# Patient Record
Sex: MALE | Race: WHITE | NOT HISPANIC OR LATINO
[De-identification: names, ages, dates, MRNs, and addresses within clinical notes are randomized per-mention and may not be internally consistent; named-entity substitution may affect disease eponyms.]

---

## 2019-02-25 ENCOUNTER — APPOINTMENT (OUTPATIENT)
Dept: ORTHOPEDIC SURGERY | Facility: CLINIC | Age: 59
End: 2019-02-25
Payer: COMMERCIAL

## 2019-02-25 DIAGNOSIS — Z80.9 FAMILY HISTORY OF MALIGNANT NEOPLASM, UNSPECIFIED: ICD-10-CM

## 2019-02-25 DIAGNOSIS — F17.200 NICOTINE DEPENDENCE, UNSPECIFIED, UNCOMPLICATED: ICD-10-CM

## 2019-02-25 DIAGNOSIS — Z78.9 OTHER SPECIFIED HEALTH STATUS: ICD-10-CM

## 2019-02-25 PROBLEM — Z00.00 ENCOUNTER FOR PREVENTIVE HEALTH EXAMINATION: Status: ACTIVE | Noted: 2019-02-25

## 2019-02-25 PROCEDURE — 99203 OFFICE O/P NEW LOW 30 MIN: CPT

## 2019-02-25 RX ORDER — CETIRIZINE HYDROCHLORIDE 10 MG/1
10 CAPSULE, LIQUID FILLED ORAL
Refills: 0 | Status: ACTIVE | COMMUNITY
Start: 2019-02-25

## 2019-02-25 NOTE — REVIEW OF SYSTEMS
[Arthralgia] : arthralgia [Joint Pain] : joint pain [Negative] : Heme/Lymph [Joint Swelling] : no joint swelling

## 2019-02-25 NOTE — PHYSICAL EXAM
[2+] : left 2+ [Normal] : Alert and in no acute distress [de-identified] : Decreased ROM with flexion, internal rotation, and external rotation. Pain and restriction with ROM about the left hip.  [de-identified] : Sensation grossly intact about bilateral lower extremities.  [de-identified] : Non tender to palpation about the left hip, no swelling noted. [de-identified] : Radiographs on 2 2519 AP pelvis and lateral left hip shows a very structures intact no fractures or dislocations he has maintained joint space of the right hip superior lateral joint space narrowing of the left hip consistent with advanced osteoarthritis

## 2019-02-25 NOTE — ASSESSMENT
[FreeTextEntry1] : This a 58-year-old male who presents with a one-month history of severe left groin pain. It occurs with walking sitting twisting turning. He is pain with talar shoes and socks getting in and out of chairs and doing most activities of daily living. He's been on diclofenac 50 mg t.i.d. with no relief his physical exam reveals a very restricted painful range of motion of the left hip which elicits severe groin pain the contralateral hip exam is normal the skin and neurovascular status left lower extremity is intact radiographs present for imaging impression severe osteoarthritis of left hip with incapacitating pain not responsive to medical management the plan is to switch him from diclofenac to meloxicam. The patient states that if in a month's time he has not had a favorable response to the medication he would like to proceed with elective left total hip replacement surgery the surgery was discussed as well as potential risks and complications\par The risks, benefits, alternatives of treatment, and aftercare precautions following joint replacement surgery were reviewed with the patient and all questions were answered. Models were used, radiographs reviewed and a brochure was given to the patient. The implant type utilized, including bearing surfaces fixation techniques were reviewed in detail, and the patient chose to proceed with elective joint replacement surgery. The patient's body mass index was recorded in my office notes, and for those patients whose  body mass index of greater than 30, I recommended that they review a weight reduction program with their internist. The importance of smoking cessation was reviewed with all patient's, and for those patients who still smoke, I recommended that they review a smoking cessation program with their internist.\par

## 2019-02-25 NOTE — HISTORY OF PRESENT ILLNESS
[Pain Location] : pain [] : left hip [___ mths] : [unfilled] month(s) ago [10] : a maximum pain level of 10/10 [Standing] : standing [Constant] : ~He/She~ states the symptoms seem to be constant [Sitting] : worsened by sitting [Walking] : worsened by walking [Heat] : relieved by heat [de-identified] : 58 year old male presents to the office today complaining of pain in the left hip x1 month. Pt reports the pain is stabbing in nature. Pt reports the pain can be illicited when moving the hip. Pt was seen by another Orthopedist who recommended he see a joint specialist.

## 2019-03-25 ENCOUNTER — APPOINTMENT (OUTPATIENT)
Dept: ORTHOPEDIC SURGERY | Facility: CLINIC | Age: 59
End: 2019-03-25
Payer: COMMERCIAL

## 2019-03-25 DIAGNOSIS — M16.12 UNILATERAL PRIMARY OSTEOARTHRITIS, LEFT HIP: ICD-10-CM

## 2019-03-25 DIAGNOSIS — M25.552 PAIN IN LEFT HIP: ICD-10-CM

## 2019-03-25 PROCEDURE — 99211 OFF/OP EST MAY X REQ PHY/QHP: CPT

## 2019-03-25 NOTE — HISTORY OF PRESENT ILLNESS
[Pain Location] : pain [] : left hip [___ mths] : [unfilled] month(s) ago [10] : a maximum pain level of 10/10 [Standing] : standing [Constant] : ~He/She~ states the symptoms seem to be constant [Sitting] : worsened by sitting [Walking] : worsened by walking [Heat] : relieved by heat [de-identified] : 58 year old male presents to the office today complaining of pain in the left hip x2 months. Pt returns back to our office today for follow up after we started him on Meloxicam. Pt reports minor relief with Meloxicam. He reports now taking Tramadol which his PMD prescribed for night time use with no relief. Pt reports increased pain with long periods of sitting and walking. Pt has a left total hip arthroplasty scheduled for April 25, 2019. Pt denies any injury or fall.  [NSAIDs] : not relieved by nonsteroidal anti-inflammatory drugs

## 2019-03-25 NOTE — REASON FOR VISIT
[Follow-Up Visit] : a follow-up visit for [Hip Pain] : hip pain [Osteoarthritis, Hip] : osteoarthritis, hip

## 2019-03-25 NOTE — PHYSICAL EXAM
[2+] : left 2+ [Normal] : Alert and in no acute distress [de-identified] : Decreased ROM with flexion, internal rotation, and external rotation. Pain and restriction with ROM about the left hip.  [de-identified] : Sensation grossly intact about bilateral lower extremities.  [de-identified] : Non tender to palpation about the left hip, no swelling noted.

## 2019-03-25 NOTE — ASSESSMENT
[FreeTextEntry1] : Patient comes in today for followup let us know if the meloxicam that we started him on was helpful. He says it was not and he would like to proceed with surgical intervention. He scheduled in April for hip replacement surgery. His primary care doctor recently given tramadol for pain to take at night to help sleep. We'll proceed with surgery as planned

## 2019-04-11 ENCOUNTER — OUTPATIENT (OUTPATIENT)
Dept: OUTPATIENT SERVICES | Facility: HOSPITAL | Age: 59
LOS: 1 days | Discharge: HOME | End: 2019-04-11
Payer: COMMERCIAL

## 2019-04-11 VITALS
SYSTOLIC BLOOD PRESSURE: 120 MMHG | HEIGHT: 72 IN | RESPIRATION RATE: 16 BRPM | WEIGHT: 233.69 LBS | HEART RATE: 72 BPM | OXYGEN SATURATION: 96 % | DIASTOLIC BLOOD PRESSURE: 81 MMHG | TEMPERATURE: 97 F

## 2019-04-11 DIAGNOSIS — Z98.890 OTHER SPECIFIED POSTPROCEDURAL STATES: Chronic | ICD-10-CM

## 2019-04-11 DIAGNOSIS — Z96.642 PRESENCE OF LEFT ARTIFICIAL HIP JOINT: Chronic | ICD-10-CM

## 2019-04-11 DIAGNOSIS — Z98.84 BARIATRIC SURGERY STATUS: Chronic | ICD-10-CM

## 2019-04-11 LAB
ALBUMIN SERPL ELPH-MCNC: 4.5 G/DL — SIGNIFICANT CHANGE UP (ref 3.5–5.2)
ALP SERPL-CCNC: 79 U/L — SIGNIFICANT CHANGE UP (ref 30–115)
ALT FLD-CCNC: 26 U/L — SIGNIFICANT CHANGE UP (ref 0–41)
ANION GAP SERPL CALC-SCNC: 12 MMOL/L — SIGNIFICANT CHANGE UP (ref 7–14)
APPEARANCE UR: CLEAR — SIGNIFICANT CHANGE UP
APTT BLD: 34.7 SEC — SIGNIFICANT CHANGE UP (ref 27–39.2)
AST SERPL-CCNC: 21 U/L — SIGNIFICANT CHANGE UP (ref 0–41)
BASOPHILS # BLD AUTO: 0.05 K/UL — SIGNIFICANT CHANGE UP (ref 0–0.2)
BASOPHILS NFR BLD AUTO: 0.4 % — SIGNIFICANT CHANGE UP (ref 0–1)
BILIRUB SERPL-MCNC: 0.3 MG/DL — SIGNIFICANT CHANGE UP (ref 0.2–1.2)
BILIRUB UR-MCNC: NEGATIVE — SIGNIFICANT CHANGE UP
BLD GP AB SCN SERPL QL: SIGNIFICANT CHANGE UP
BUN SERPL-MCNC: 17 MG/DL — SIGNIFICANT CHANGE UP (ref 10–20)
CALCIUM SERPL-MCNC: 9.5 MG/DL — SIGNIFICANT CHANGE UP (ref 8.5–10.1)
CHLORIDE SERPL-SCNC: 104 MMOL/L — SIGNIFICANT CHANGE UP (ref 98–110)
CO2 SERPL-SCNC: 23 MMOL/L — SIGNIFICANT CHANGE UP (ref 17–32)
COLOR SPEC: YELLOW — SIGNIFICANT CHANGE UP
CREAT SERPL-MCNC: 0.9 MG/DL — SIGNIFICANT CHANGE UP (ref 0.7–1.5)
DIFF PNL FLD: ABNORMAL
EOSINOPHIL # BLD AUTO: 0.34 K/UL — SIGNIFICANT CHANGE UP (ref 0–0.7)
EOSINOPHIL NFR BLD AUTO: 2.8 % — SIGNIFICANT CHANGE UP (ref 0–8)
ESTIMATED AVERAGE GLUCOSE: 120 MG/DL — HIGH (ref 68–114)
GLUCOSE SERPL-MCNC: 101 MG/DL — HIGH (ref 70–99)
GLUCOSE UR QL: NEGATIVE MG/DL — SIGNIFICANT CHANGE UP
HBA1C BLD-MCNC: 5.8 % — HIGH (ref 4–5.6)
HCT VFR BLD CALC: 45.9 % — SIGNIFICANT CHANGE UP (ref 42–52)
HGB BLD-MCNC: 15.7 G/DL — SIGNIFICANT CHANGE UP (ref 14–18)
IMM GRANULOCYTES NFR BLD AUTO: 0.6 % — HIGH (ref 0.1–0.3)
INR BLD: 0.96 RATIO — SIGNIFICANT CHANGE UP (ref 0.65–1.3)
KETONES UR-MCNC: NEGATIVE — SIGNIFICANT CHANGE UP
LEUKOCYTE ESTERASE UR-ACNC: NEGATIVE — SIGNIFICANT CHANGE UP
LYMPHOCYTES # BLD AUTO: 2.81 K/UL — SIGNIFICANT CHANGE UP (ref 1.2–3.4)
LYMPHOCYTES # BLD AUTO: 23.4 % — SIGNIFICANT CHANGE UP (ref 20.5–51.1)
MCHC RBC-ENTMCNC: 30.6 PG — SIGNIFICANT CHANGE UP (ref 27–31)
MCHC RBC-ENTMCNC: 34.2 G/DL — SIGNIFICANT CHANGE UP (ref 32–37)
MCV RBC AUTO: 89.5 FL — SIGNIFICANT CHANGE UP (ref 80–94)
MONOCYTES # BLD AUTO: 1.11 K/UL — HIGH (ref 0.1–0.6)
MONOCYTES NFR BLD AUTO: 9.2 % — SIGNIFICANT CHANGE UP (ref 1.7–9.3)
MRSA PCR RESULT.: NEGATIVE — SIGNIFICANT CHANGE UP
NEUTROPHILS # BLD AUTO: 7.63 K/UL — HIGH (ref 1.4–6.5)
NEUTROPHILS NFR BLD AUTO: 63.6 % — SIGNIFICANT CHANGE UP (ref 42.2–75.2)
NITRITE UR-MCNC: NEGATIVE — SIGNIFICANT CHANGE UP
NRBC # BLD: 0 /100 WBCS — SIGNIFICANT CHANGE UP (ref 0–0)
PH UR: 6 — SIGNIFICANT CHANGE UP (ref 5–8)
PLATELET # BLD AUTO: 228 K/UL — SIGNIFICANT CHANGE UP (ref 130–400)
POTASSIUM SERPL-MCNC: 4.6 MMOL/L — SIGNIFICANT CHANGE UP (ref 3.5–5)
POTASSIUM SERPL-SCNC: 4.6 MMOL/L — SIGNIFICANT CHANGE UP (ref 3.5–5)
PROT SERPL-MCNC: 7.2 G/DL — SIGNIFICANT CHANGE UP (ref 6–8)
PROT UR-MCNC: ABNORMAL MG/DL
PROTHROM AB SERPL-ACNC: 11.1 SEC — SIGNIFICANT CHANGE UP (ref 9.95–12.87)
RBC # BLD: 5.13 M/UL — SIGNIFICANT CHANGE UP (ref 4.7–6.1)
RBC # FLD: 13 % — SIGNIFICANT CHANGE UP (ref 11.5–14.5)
RBC CASTS # UR COMP ASSIST: ABNORMAL /HPF
SODIUM SERPL-SCNC: 139 MMOL/L — SIGNIFICANT CHANGE UP (ref 135–146)
SP GR SPEC: 1.02 — SIGNIFICANT CHANGE UP (ref 1.01–1.03)
TYPE + AB SCN PNL BLD: SIGNIFICANT CHANGE UP
UROBILINOGEN FLD QL: 1 MG/DL (ref 0.2–0.2)
WBC # BLD: 12.01 K/UL — HIGH (ref 4.8–10.8)
WBC # FLD AUTO: 12.01 K/UL — HIGH (ref 4.8–10.8)
WBC UR QL: SIGNIFICANT CHANGE UP /HPF

## 2019-04-11 PROCEDURE — 71046 X-RAY EXAM CHEST 2 VIEWS: CPT | Mod: 26

## 2019-04-11 PROCEDURE — 93010 ELECTROCARDIOGRAM REPORT: CPT

## 2019-04-11 PROCEDURE — 73502 X-RAY EXAM HIP UNI 2-3 VIEWS: CPT | Mod: 26,LT

## 2019-04-11 NOTE — H&P PST ADULT - REASON FOR ADMISSION
57 yo male presents for left total hip replacement, per pt has been causing pain since january; pt is scheduled for left total hip replacement  denies chest pain, palpitations, shortness of breath, dyspnea, or dysuria. exercise tolerance: 2 blocks/ flights of stairs w/o sob; limited dt pain

## 2019-04-11 NOTE — H&P PST ADULT - NSICDXPASTSURGICALHX_GEN_ALL_CORE_FT
PAST SURGICAL HISTORY:  H/O bariatric surgery s/p gastric sleeve    H/O repair of right rotator cuff

## 2019-04-11 NOTE — H&P PST ADULT - NSICDXPASTMEDICALHX_GEN_ALL_CORE_FT
PAST MEDICAL HISTORY:  H/O seasonal allergies     HTN (hypertension)     Obesity s/p gastric sleeve ~ 2013    LINDA (obstructive sleep apnea)

## 2019-04-12 DIAGNOSIS — Z01.818 ENCOUNTER FOR OTHER PREPROCEDURAL EXAMINATION: ICD-10-CM

## 2019-04-12 DIAGNOSIS — I10 ESSENTIAL (PRIMARY) HYPERTENSION: ICD-10-CM

## 2019-04-12 DIAGNOSIS — E66.9 OBESITY, UNSPECIFIED: ICD-10-CM

## 2019-04-12 DIAGNOSIS — M16.11 UNILATERAL PRIMARY OSTEOARTHRITIS, RIGHT HIP: ICD-10-CM

## 2019-04-12 DIAGNOSIS — G47.30 SLEEP APNEA, UNSPECIFIED: ICD-10-CM

## 2019-04-12 DIAGNOSIS — Z87.891 PERSONAL HISTORY OF NICOTINE DEPENDENCE: ICD-10-CM

## 2019-04-12 LAB
CULTURE RESULTS: SIGNIFICANT CHANGE UP
SPECIMEN SOURCE: SIGNIFICANT CHANGE UP

## 2019-04-15 NOTE — PROGRESS NOTE ADULT - SUBJECTIVE AND OBJECTIVE BOX
PAST documents reviewed - MED. DIR. PAST - ANESTHESIA - as of this review for patient scheduled for Left THR under Spinal / Regional Anesthesia with  on 04/16/2019 : I called the patient concerning the Anesthesia plan and medications . He understands he is having Spinal / Regional Anesthesia which I explained preliminarily and informed him that the assigned anesthesiologist would explain it in full pre op . He is on Ramipril , which he takes qhs and was told not to take it in the AM . He takes Tramadol and was told that if he takes it the night before surgery , to take it pre MN with a sip of H2O and inform the admitting RN of the time and dosage . He has taken Meloxicam and Ibuprophen in the past , but not for > 7 days . He has attended the Joint Replacement class and is aware of the ERAS / Gatorade protocol ( confirmed with discussion ) . He takes no AC / Antiplatelets / other Rx or OTC NSAID 's ( all explained to him in full and he expressed understanding not to take these drugs pre op ) .

## 2019-04-16 ENCOUNTER — APPOINTMENT (OUTPATIENT)
Dept: ORTHOPEDIC SURGERY | Facility: HOSPITAL | Age: 59
End: 2019-04-16
Payer: COMMERCIAL

## 2019-04-16 ENCOUNTER — INPATIENT (INPATIENT)
Facility: HOSPITAL | Age: 59
LOS: 1 days | Discharge: HOME HEALTH | End: 2019-04-18
Attending: ORTHOPAEDIC SURGERY | Admitting: ORTHOPAEDIC SURGERY
Payer: COMMERCIAL

## 2019-04-16 ENCOUNTER — RESULT REVIEW (OUTPATIENT)
Age: 59
End: 2019-04-16

## 2019-04-16 VITALS
RESPIRATION RATE: 18 BRPM | SYSTOLIC BLOOD PRESSURE: 135 MMHG | TEMPERATURE: 98 F | HEIGHT: 72 IN | HEART RATE: 72 BPM | DIASTOLIC BLOOD PRESSURE: 71 MMHG | WEIGHT: 233.69 LBS

## 2019-04-16 DIAGNOSIS — Z98.84 BARIATRIC SURGERY STATUS: Chronic | ICD-10-CM

## 2019-04-16 DIAGNOSIS — Z96.642 PRESENCE OF LEFT ARTIFICIAL HIP JOINT: Chronic | ICD-10-CM

## 2019-04-16 DIAGNOSIS — Z98.890 OTHER SPECIFIED POSTPROCEDURAL STATES: Chronic | ICD-10-CM

## 2019-04-16 DIAGNOSIS — Z90.49 ACQUIRED ABSENCE OF OTHER SPECIFIED PARTS OF DIGESTIVE TRACT: Chronic | ICD-10-CM

## 2019-04-16 LAB
BLD GP AB SCN SERPL QL: SIGNIFICANT CHANGE UP
GLUCOSE BLDC GLUCOMTR-MCNC: 114 MG/DL — HIGH (ref 70–99)
HCT VFR BLD CALC: 42.9 % — SIGNIFICANT CHANGE UP (ref 42–52)
HGB BLD-MCNC: 14.4 G/DL — SIGNIFICANT CHANGE UP (ref 14–18)
MCHC RBC-ENTMCNC: 30.5 PG — SIGNIFICANT CHANGE UP (ref 27–31)
MCHC RBC-ENTMCNC: 33.6 G/DL — SIGNIFICANT CHANGE UP (ref 32–37)
MCV RBC AUTO: 90.9 FL — SIGNIFICANT CHANGE UP (ref 80–94)
NRBC # BLD: 0 /100 WBCS — SIGNIFICANT CHANGE UP (ref 0–0)
PLATELET # BLD AUTO: 200 K/UL — SIGNIFICANT CHANGE UP (ref 130–400)
RBC # BLD: 4.72 M/UL — SIGNIFICANT CHANGE UP (ref 4.7–6.1)
RBC # FLD: 12.9 % — SIGNIFICANT CHANGE UP (ref 11.5–14.5)
TYPE + AB SCN PNL BLD: SIGNIFICANT CHANGE UP
WBC # BLD: 21.1 K/UL — HIGH (ref 4.8–10.8)
WBC # FLD AUTO: 21.1 K/UL — HIGH (ref 4.8–10.8)

## 2019-04-16 PROCEDURE — 88305 TISSUE EXAM BY PATHOLOGIST: CPT | Mod: 26

## 2019-04-16 PROCEDURE — 88311 DECALCIFY TISSUE: CPT | Mod: 26

## 2019-04-16 PROCEDURE — 27130 TOTAL HIP ARTHROPLASTY: CPT | Mod: LT

## 2019-04-16 RX ORDER — OXYCODONE HYDROCHLORIDE 5 MG/1
5 TABLET ORAL EVERY 4 HOURS
Qty: 0 | Refills: 0 | Status: DISCONTINUED | OUTPATIENT
Start: 2019-04-16 | End: 2019-04-17

## 2019-04-16 RX ORDER — ASPIRIN/CALCIUM CARB/MAGNESIUM 324 MG
325 TABLET ORAL EVERY 12 HOURS
Qty: 0 | Refills: 0 | Status: DISCONTINUED | OUTPATIENT
Start: 2019-04-17 | End: 2019-04-18

## 2019-04-16 RX ORDER — SODIUM CHLORIDE 9 MG/ML
1000 INJECTION INTRAMUSCULAR; INTRAVENOUS; SUBCUTANEOUS
Qty: 0 | Refills: 0 | Status: DISCONTINUED | OUTPATIENT
Start: 2019-04-16 | End: 2019-04-17

## 2019-04-16 RX ORDER — CELECOXIB 200 MG/1
400 CAPSULE ORAL ONCE
Qty: 0 | Refills: 0 | Status: COMPLETED | OUTPATIENT
Start: 2019-04-16 | End: 2019-04-16

## 2019-04-16 RX ORDER — CEFAZOLIN SODIUM 1 G
2000 VIAL (EA) INJECTION EVERY 8 HOURS
Qty: 0 | Refills: 0 | Status: COMPLETED | OUTPATIENT
Start: 2019-04-17 | End: 2019-04-17

## 2019-04-16 RX ORDER — OXYCODONE HYDROCHLORIDE 5 MG/1
10 TABLET ORAL EVERY 4 HOURS
Qty: 0 | Refills: 0 | Status: DISCONTINUED | OUTPATIENT
Start: 2019-04-16 | End: 2019-04-17

## 2019-04-16 RX ORDER — PANTOPRAZOLE SODIUM 20 MG/1
40 TABLET, DELAYED RELEASE ORAL
Qty: 0 | Refills: 0 | Status: DISCONTINUED | OUTPATIENT
Start: 2019-04-16 | End: 2019-04-18

## 2019-04-16 RX ORDER — HYDROMORPHONE HYDROCHLORIDE 2 MG/ML
1 INJECTION INTRAMUSCULAR; INTRAVENOUS; SUBCUTANEOUS
Qty: 0 | Refills: 0 | Status: DISCONTINUED | OUTPATIENT
Start: 2019-04-16 | End: 2019-04-16

## 2019-04-16 RX ORDER — CELECOXIB 200 MG/1
200 CAPSULE ORAL DAILY
Qty: 0 | Refills: 0 | Status: DISCONTINUED | OUTPATIENT
Start: 2019-04-17 | End: 2019-04-18

## 2019-04-16 RX ORDER — HYDROMORPHONE HYDROCHLORIDE 2 MG/ML
0.5 INJECTION INTRAMUSCULAR; INTRAVENOUS; SUBCUTANEOUS
Qty: 0 | Refills: 0 | Status: DISCONTINUED | OUTPATIENT
Start: 2019-04-16 | End: 2019-04-17

## 2019-04-16 RX ORDER — ACETAMINOPHEN 500 MG
1000 TABLET ORAL ONCE
Qty: 0 | Refills: 0 | Status: COMPLETED | OUTPATIENT
Start: 2019-04-16 | End: 2019-04-16

## 2019-04-16 RX ORDER — ACETAMINOPHEN 500 MG
650 TABLET ORAL EVERY 6 HOURS
Qty: 0 | Refills: 0 | Status: DISCONTINUED | OUTPATIENT
Start: 2019-04-16 | End: 2019-04-18

## 2019-04-16 RX ORDER — HYDROMORPHONE HYDROCHLORIDE 2 MG/ML
0.5 INJECTION INTRAMUSCULAR; INTRAVENOUS; SUBCUTANEOUS
Qty: 0 | Refills: 0 | Status: DISCONTINUED | OUTPATIENT
Start: 2019-04-16 | End: 2019-04-16

## 2019-04-16 RX ORDER — GABAPENTIN 400 MG/1
300 CAPSULE ORAL ONCE
Qty: 0 | Refills: 0 | Status: COMPLETED | OUTPATIENT
Start: 2019-04-16 | End: 2019-04-16

## 2019-04-16 RX ORDER — SODIUM CHLORIDE 9 MG/ML
1000 INJECTION, SOLUTION INTRAVENOUS
Qty: 0 | Refills: 0 | Status: DISCONTINUED | OUTPATIENT
Start: 2019-04-16 | End: 2019-04-17

## 2019-04-16 RX ORDER — ONDANSETRON 8 MG/1
4 TABLET, FILM COATED ORAL EVERY 6 HOURS
Qty: 0 | Refills: 0 | Status: DISCONTINUED | OUTPATIENT
Start: 2019-04-16 | End: 2019-04-18

## 2019-04-16 RX ORDER — FERROUS SULFATE 325(65) MG
325 TABLET ORAL
Qty: 0 | Refills: 0 | Status: DISCONTINUED | OUTPATIENT
Start: 2019-04-16 | End: 2019-04-18

## 2019-04-16 RX ORDER — CHLORHEXIDINE GLUCONATE 213 G/1000ML
1 SOLUTION TOPICAL DAILY
Qty: 0 | Refills: 0 | Status: DISCONTINUED | OUTPATIENT
Start: 2019-04-16 | End: 2019-04-18

## 2019-04-16 RX ORDER — MEPERIDINE HYDROCHLORIDE 50 MG/ML
12.5 INJECTION INTRAMUSCULAR; INTRAVENOUS; SUBCUTANEOUS
Qty: 0 | Refills: 0 | Status: DISCONTINUED | OUTPATIENT
Start: 2019-04-16 | End: 2019-04-16

## 2019-04-16 RX ORDER — GABAPENTIN 400 MG/1
100 CAPSULE ORAL EVERY 8 HOURS
Qty: 0 | Refills: 0 | Status: DISCONTINUED | OUTPATIENT
Start: 2019-04-16 | End: 2019-04-16

## 2019-04-16 RX ORDER — SODIUM CHLORIDE 9 MG/ML
1000 INJECTION, SOLUTION INTRAVENOUS
Qty: 0 | Refills: 0 | Status: DISCONTINUED | OUTPATIENT
Start: 2019-04-16 | End: 2019-04-16

## 2019-04-16 RX ORDER — HYDROMORPHONE HYDROCHLORIDE 2 MG/ML
1 INJECTION INTRAMUSCULAR; INTRAVENOUS; SUBCUTANEOUS
Qty: 0 | Refills: 0 | Status: DISCONTINUED | OUTPATIENT
Start: 2019-04-16 | End: 2019-04-17

## 2019-04-16 RX ORDER — SENNA PLUS 8.6 MG/1
2 TABLET ORAL AT BEDTIME
Qty: 0 | Refills: 0 | Status: DISCONTINUED | OUTPATIENT
Start: 2019-04-16 | End: 2019-04-18

## 2019-04-16 RX ORDER — ONDANSETRON 8 MG/1
4 TABLET, FILM COATED ORAL ONCE
Qty: 0 | Refills: 0 | Status: DISCONTINUED | OUTPATIENT
Start: 2019-04-16 | End: 2019-04-16

## 2019-04-16 RX ORDER — DOCUSATE SODIUM 100 MG
100 CAPSULE ORAL THREE TIMES A DAY
Qty: 0 | Refills: 0 | Status: DISCONTINUED | OUTPATIENT
Start: 2019-04-16 | End: 2019-04-18

## 2019-04-16 RX ORDER — LISINOPRIL 2.5 MG/1
20 TABLET ORAL DAILY
Qty: 0 | Refills: 0 | Status: DISCONTINUED | OUTPATIENT
Start: 2019-04-17 | End: 2019-04-18

## 2019-04-16 RX ORDER — GABAPENTIN 400 MG/1
100 CAPSULE ORAL EVERY 8 HOURS
Qty: 0 | Refills: 0 | Status: DISCONTINUED | OUTPATIENT
Start: 2019-04-16 | End: 2019-04-18

## 2019-04-16 RX ADMIN — HYDROMORPHONE HYDROCHLORIDE 0.5 MILLIGRAM(S): 2 INJECTION INTRAMUSCULAR; INTRAVENOUS; SUBCUTANEOUS at 20:30

## 2019-04-16 RX ADMIN — HYDROMORPHONE HYDROCHLORIDE 0.5 MILLIGRAM(S): 2 INJECTION INTRAMUSCULAR; INTRAVENOUS; SUBCUTANEOUS at 20:40

## 2019-04-16 RX ADMIN — GABAPENTIN 300 MILLIGRAM(S): 400 CAPSULE ORAL at 12:40

## 2019-04-16 RX ADMIN — CELECOXIB 400 MILLIGRAM(S): 200 CAPSULE ORAL at 12:40

## 2019-04-16 RX ADMIN — Medication 650 MILLIGRAM(S): at 23:18

## 2019-04-16 RX ADMIN — HYDROMORPHONE HYDROCHLORIDE 0.5 MILLIGRAM(S): 2 INJECTION INTRAMUSCULAR; INTRAVENOUS; SUBCUTANEOUS at 21:10

## 2019-04-16 RX ADMIN — GABAPENTIN 100 MILLIGRAM(S): 400 CAPSULE ORAL at 23:18

## 2019-04-16 RX ADMIN — HYDROMORPHONE HYDROCHLORIDE 0.5 MILLIGRAM(S): 2 INJECTION INTRAMUSCULAR; INTRAVENOUS; SUBCUTANEOUS at 21:55

## 2019-04-16 RX ADMIN — Medication 1000 MILLIGRAM(S): at 12:40

## 2019-04-16 RX ADMIN — HYDROMORPHONE HYDROCHLORIDE 0.5 MILLIGRAM(S): 2 INJECTION INTRAMUSCULAR; INTRAVENOUS; SUBCUTANEOUS at 21:21

## 2019-04-16 RX ADMIN — SODIUM CHLORIDE 100 MILLILITER(S): 9 INJECTION INTRAMUSCULAR; INTRAVENOUS; SUBCUTANEOUS at 21:30

## 2019-04-16 RX ADMIN — HYDROMORPHONE HYDROCHLORIDE 0.5 MILLIGRAM(S): 2 INJECTION INTRAMUSCULAR; INTRAVENOUS; SUBCUTANEOUS at 21:00

## 2019-04-16 RX ADMIN — Medication 100 MILLIGRAM(S): at 23:19

## 2019-04-16 RX ADMIN — SODIUM CHLORIDE 120 MILLILITER(S): 9 INJECTION, SOLUTION INTRAVENOUS at 20:11

## 2019-04-16 RX ADMIN — HYDROMORPHONE HYDROCHLORIDE 0.5 MILLIGRAM(S): 2 INJECTION INTRAMUSCULAR; INTRAVENOUS; SUBCUTANEOUS at 20:55

## 2019-04-16 NOTE — CHART NOTE - NSCHARTNOTEFT_GEN_A_CORE
PACU ANESTHESIA ADMISSION NOTE      Procedure: Arthroplasty of left hip    Post op diagnosis:  Osteoarthritis of left hip      ____  Intubated  TV:______       Rate: ______      FiO2: ______    __x__  Patent Airway    ____  Full return of protective reflexes    ____  Full recovery from anesthesia / back to baseline     Vitals:   T:    98       R:    12              BP:     149/79              Sat:    98%                P:  89      Mental Status:  __x__ Awake   _____ Alert   _____ Drowsy   _____ Sedated    Nausea/Vomiting:  _x___ NO  ______Yes,   See Post - Op Orders          Pain Scale (0-10):  _____    Treatment: ____ None    __x__ See Post - Op/PCA Orders    Post - Operative Fluids:   ____ Oral   _x___ See Post - Op Orders    Plan: Discharge:   ____Home       __x___Floor     _____Critical Care    _____  Other:_________________    Comments: Uneventful intraoperative course. No anesthesia issues or complications noted.  Patient stable upon arrival to PACU. Report given to RN. Discharge when criteria met.

## 2019-04-16 NOTE — ASU PATIENT PROFILE, ADULT - PSH
H/O bariatric surgery  s/p gastric sleeve  H/O repair of right rotator cuff    History of appendectomy

## 2019-04-16 NOTE — PROGRESS NOTE ADULT - SUBJECTIVE AND OBJECTIVE BOX
ORTHO POC    Patient seen and examined after his surgery. Resting comfortably, no active complaints. Denies fevers, chills, SOB.     PE :  LLE :   Dressings c/d/i  Thigh and calf soft and compressible  EHL TA GS motor intact  SILT distally  Foot warm and perfused    A/P  58M s/p L EVELINE:  - Pain control  - DVT PPX: ASA + SCD  - IS  - WBAT LLE  - PT OT OOBTC  - F/U AM Labs  - Dispo Planning: likely home  - Ortho primary management

## 2019-04-16 NOTE — ASU PATIENT PROFILE, ADULT - PMH
H/O seasonal allergies    HTN (hypertension)    Obesity  s/p gastric sleeve ~ 2013  LINDA (obstructive sleep apnea)

## 2019-04-17 DIAGNOSIS — M25.552 PAIN IN LEFT HIP: ICD-10-CM

## 2019-04-17 LAB
ANION GAP SERPL CALC-SCNC: 11 MMOL/L — SIGNIFICANT CHANGE UP (ref 7–14)
BUN SERPL-MCNC: 14 MG/DL — SIGNIFICANT CHANGE UP (ref 10–20)
CALCIUM SERPL-MCNC: 8.4 MG/DL — LOW (ref 8.5–10.1)
CHLORIDE SERPL-SCNC: 102 MMOL/L — SIGNIFICANT CHANGE UP (ref 98–110)
CO2 SERPL-SCNC: 26 MMOL/L — SIGNIFICANT CHANGE UP (ref 17–32)
CREAT SERPL-MCNC: 0.9 MG/DL — SIGNIFICANT CHANGE UP (ref 0.7–1.5)
GLUCOSE BLDC GLUCOMTR-MCNC: 101 MG/DL — HIGH (ref 70–99)
GLUCOSE BLDC GLUCOMTR-MCNC: 96 MG/DL — SIGNIFICANT CHANGE UP (ref 70–99)
GLUCOSE SERPL-MCNC: 109 MG/DL — HIGH (ref 70–99)
HCT VFR BLD CALC: 36 % — LOW (ref 42–52)
HGB BLD-MCNC: 11.9 G/DL — LOW (ref 14–18)
MCHC RBC-ENTMCNC: 30.2 PG — SIGNIFICANT CHANGE UP (ref 27–31)
MCHC RBC-ENTMCNC: 33.1 G/DL — SIGNIFICANT CHANGE UP (ref 32–37)
MCV RBC AUTO: 91.4 FL — SIGNIFICANT CHANGE UP (ref 80–94)
NRBC # BLD: 0 /100 WBCS — SIGNIFICANT CHANGE UP (ref 0–0)
PLATELET # BLD AUTO: 197 K/UL — SIGNIFICANT CHANGE UP (ref 130–400)
POTASSIUM SERPL-MCNC: 4.5 MMOL/L — SIGNIFICANT CHANGE UP (ref 3.5–5)
POTASSIUM SERPL-SCNC: 4.5 MMOL/L — SIGNIFICANT CHANGE UP (ref 3.5–5)
RBC # BLD: 3.94 M/UL — LOW (ref 4.7–6.1)
RBC # FLD: 12.9 % — SIGNIFICANT CHANGE UP (ref 11.5–14.5)
SODIUM SERPL-SCNC: 139 MMOL/L — SIGNIFICANT CHANGE UP (ref 135–146)
WBC # BLD: 11.6 K/UL — HIGH (ref 4.8–10.8)
WBC # FLD AUTO: 11.6 K/UL — HIGH (ref 4.8–10.8)

## 2019-04-17 RX ORDER — KETOROLAC TROMETHAMINE 30 MG/ML
30 SYRINGE (ML) INJECTION EVERY 6 HOURS
Qty: 0 | Refills: 0 | Status: DISCONTINUED | OUTPATIENT
Start: 2019-04-17 | End: 2019-04-17

## 2019-04-17 RX ORDER — OXYCODONE HYDROCHLORIDE 5 MG/1
10 TABLET ORAL EVERY 4 HOURS
Qty: 0 | Refills: 0 | Status: DISCONTINUED | OUTPATIENT
Start: 2019-04-17 | End: 2019-04-18

## 2019-04-17 RX ORDER — KETOROLAC TROMETHAMINE 30 MG/ML
30 SYRINGE (ML) INJECTION EVERY 6 HOURS
Qty: 0 | Refills: 0 | Status: DISCONTINUED | OUTPATIENT
Start: 2019-04-17 | End: 2019-04-18

## 2019-04-17 RX ORDER — MORPHINE SULFATE 50 MG/1
2 CAPSULE, EXTENDED RELEASE ORAL EVERY 4 HOURS
Qty: 0 | Refills: 0 | Status: DISCONTINUED | OUTPATIENT
Start: 2019-04-17 | End: 2019-04-17

## 2019-04-17 RX ADMIN — Medication 325 MILLIGRAM(S): at 17:17

## 2019-04-17 RX ADMIN — Medication 30 MILLIGRAM(S): at 18:05

## 2019-04-17 RX ADMIN — Medication 325 MILLIGRAM(S): at 11:08

## 2019-04-17 RX ADMIN — Medication 650 MILLIGRAM(S): at 17:17

## 2019-04-17 RX ADMIN — Medication 650 MILLIGRAM(S): at 05:20

## 2019-04-17 RX ADMIN — MORPHINE SULFATE 2 MILLIGRAM(S): 50 CAPSULE, EXTENDED RELEASE ORAL at 12:52

## 2019-04-17 RX ADMIN — Medication 100 MILLIGRAM(S): at 05:20

## 2019-04-17 RX ADMIN — LISINOPRIL 20 MILLIGRAM(S): 2.5 TABLET ORAL at 05:20

## 2019-04-17 RX ADMIN — Medication 325 MILLIGRAM(S): at 08:00

## 2019-04-17 RX ADMIN — PANTOPRAZOLE SODIUM 40 MILLIGRAM(S): 20 TABLET, DELAYED RELEASE ORAL at 08:00

## 2019-04-17 RX ADMIN — GABAPENTIN 100 MILLIGRAM(S): 400 CAPSULE ORAL at 21:45

## 2019-04-17 RX ADMIN — MORPHINE SULFATE 2 MILLIGRAM(S): 50 CAPSULE, EXTENDED RELEASE ORAL at 01:00

## 2019-04-17 RX ADMIN — Medication 100 MILLIGRAM(S): at 21:45

## 2019-04-17 RX ADMIN — Medication 325 MILLIGRAM(S): at 05:20

## 2019-04-17 RX ADMIN — CELECOXIB 200 MILLIGRAM(S): 200 CAPSULE ORAL at 11:06

## 2019-04-17 RX ADMIN — Medication 650 MILLIGRAM(S): at 18:05

## 2019-04-17 RX ADMIN — OXYCODONE HYDROCHLORIDE 10 MILLIGRAM(S): 5 TABLET ORAL at 18:44

## 2019-04-17 RX ADMIN — Medication 100 MILLIGRAM(S): at 05:21

## 2019-04-17 RX ADMIN — CHLORHEXIDINE GLUCONATE 1 APPLICATION(S): 213 SOLUTION TOPICAL at 11:06

## 2019-04-17 RX ADMIN — Medication 30 MILLIGRAM(S): at 17:17

## 2019-04-17 RX ADMIN — OXYCODONE HYDROCHLORIDE 10 MILLIGRAM(S): 5 TABLET ORAL at 10:50

## 2019-04-17 RX ADMIN — Medication 1 TABLET(S): at 11:06

## 2019-04-17 RX ADMIN — OXYCODONE HYDROCHLORIDE 10 MILLIGRAM(S): 5 TABLET ORAL at 05:19

## 2019-04-17 RX ADMIN — GABAPENTIN 100 MILLIGRAM(S): 400 CAPSULE ORAL at 14:07

## 2019-04-17 RX ADMIN — Medication 100 MILLIGRAM(S): at 14:07

## 2019-04-17 RX ADMIN — OXYCODONE HYDROCHLORIDE 10 MILLIGRAM(S): 5 TABLET ORAL at 00:53

## 2019-04-17 RX ADMIN — OXYCODONE HYDROCHLORIDE 10 MILLIGRAM(S): 5 TABLET ORAL at 10:20

## 2019-04-17 RX ADMIN — OXYCODONE HYDROCHLORIDE 10 MILLIGRAM(S): 5 TABLET ORAL at 21:50

## 2019-04-17 RX ADMIN — Medication 650 MILLIGRAM(S): at 11:06

## 2019-04-17 RX ADMIN — OXYCODONE HYDROCHLORIDE 10 MILLIGRAM(S): 5 TABLET ORAL at 21:45

## 2019-04-17 RX ADMIN — Medication 100 MILLIGRAM(S): at 12:54

## 2019-04-17 RX ADMIN — GABAPENTIN 100 MILLIGRAM(S): 400 CAPSULE ORAL at 05:21

## 2019-04-17 RX ADMIN — OXYCODONE HYDROCHLORIDE 10 MILLIGRAM(S): 5 TABLET ORAL at 18:06

## 2019-04-17 NOTE — PHYSICAL THERAPY INITIAL EVALUATION ADULT - GENERAL OBSERVATIONS, REHAB EVAL
8:45-8:15. chart reviewed. Pt received sitting at B/S chair, alert, oriented, able to follow multi step instructions and agreeable to PT evaluation. + IV, c/o of L hip pain 6/10 increased with movement 9/10. BP before tx 109/55, Pt is asymptomatic, stated it is his baseline BP. BP post tx 83/45 with no symptoms. nursing made aware. 8:45-9:15. chart reviewed. Pt received sitting at B/S chair, alert, oriented, able to follow multi step instructions and agreeable to PT evaluation. + IV, c/o of L hip pain 6/10 increased with movement 9/10. BP before tx 109/55, Pt is asymptomatic, stated it is his baseline BP. BP post tx 83/45 with no symptoms. nursing made aware.

## 2019-04-17 NOTE — PROVIDER CONTACT NOTE (MEDICATION) - SITUATION
patient is c/o pain with little relief from pain medication. pt c/o pain and 10 mg of oxycodone was given. the patient stated that did not help and RN gave Tylenol and Celebrex. pt then reported to Jh from

## 2019-04-17 NOTE — PHYSICAL THERAPY INITIAL EVALUATION ADULT - PLANNED THERAPY INTERVENTIONS, PT EVAL
neuromuscular re-education/transfer training/bed mobility training/strengthening/gait training/postural re-education/balance training

## 2019-04-17 NOTE — PROGRESS NOTE ADULT - PROBLEM SELECTOR PLAN 1
acetaminophen   Tablet .. 650 milliGRAM(s) Oral every 6 hours  celecoxib 200 milliGRAM(s) Oral daily  gabapentin 100 milliGRAM(s) Oral every 8 hours  DC HYDROmorphone  Injectable 0.5 milliGRAM(s) IV Push every 10 minutes PRN  DC HYDROmorphone  Injectable 1 milliGRAM(s) IV Push every 10 minutes PRN  DC morphine  - Injectable 2 milliGRAM(s) IV Push every 4 hours PRN  ondansetron Injectable 4 milliGRAM(s) IV Push every 6 hours PRN  DC oxyCODONE    IR 5 milliGRAM(s) Oral every 4 hours PRN  DC oxyCODONE    IR 10 milliGRAM(s) Oral every 4 hours PRN  MSIR 15mg PO Q4hrs PRN

## 2019-04-17 NOTE — OCCUPATIONAL THERAPY INITIAL EVALUATION ADULT - PLANNED THERAPY INTERVENTIONS, OT EVAL
ADL retraining/balance training/bed mobility training/strengthening/stretching/ROM/transfer training

## 2019-04-17 NOTE — PROVIDER CONTACT NOTE (MEDICATION) - BACKGROUND
pain management that he is still in pain. the pt does not have additional orders for pain meds for the floor. there is an order for Dilaudid to be given q1o minutes which is a PACU order, can you please D/CC that. Jh suggested MS IR 15 mg PO to be given PRN q4hrs.

## 2019-04-17 NOTE — PROVIDER CONTACT NOTE (OTHER) - SITUATION
pt has not voided since after surgery last night. I bladder scanned patient and there was a residual of 473. can you please place an order to straight cath pt.

## 2019-04-17 NOTE — OCCUPATIONAL THERAPY INITIAL EVALUATION ADULT - GENERAL OBSERVATIONS, REHAB EVAL
pt received seated in b/s chair in NAD agreeable to OT eval left supine in bed at pt's request secondary to pain in L hip RN aware

## 2019-04-17 NOTE — PROGRESS NOTE ADULT - SUBJECTIVE AND OBJECTIVE BOX
58M s/p left eveline pod1    Patient seen and examined, c/o pain, did not urinate since surgery Denies fevers, chills, SOB, CP    NAD    Vital Signs Last 24 Hrs  T(C): 36.7 (17 Apr 2019 03:59), Max: 36.9 (16 Apr 2019 21:10)  T(F): 98 (17 Apr 2019 03:59), Max: 98.5 (16 Apr 2019 22:00)  HR: 76 (17 Apr 2019 03:59) (72 - 92)  BP: 109/59 (17 Apr 2019 03:59) (104/68 - 150/93)  BP(mean): --  RR: 18 (17 Apr 2019 03:59) (12 - 21)  SpO2: 97% (16 Apr 2019 22:30) (88% - 100%)    PE :  LLE :   Dressings c/d/i  Thigh and calf soft and compressible  EHL TA GS motor intact  SILT distally  Foot warm and perfused    Abdomen soft, no SP tenderness                          14.4   21.10 )-----------( 200      ( 16 Apr 2019 20:51 )             42.9         A/P  58M s/p L EVELINE pod1    - bedside UB scan if pt will not be able to urinate when standing, d/w nurse  - Pain control  - DVT PPX: ASA + SCD  - IS  - WBAT LLE  - PT OT OOBTC  - F/U AM Labs  - Dispo Planning: likely home

## 2019-04-17 NOTE — PROGRESS NOTE ADULT - SUBJECTIVE AND OBJECTIVE BOX
Patient with left THR. pain to the left hip is now controlled with current regimen. movement and sensation present to the left leg. Pain is not controlled at this time. pain is 10/10 before and after pain meds.

## 2019-04-17 NOTE — PROVIDER CONTACT NOTE (MEDICATION) - SITUATION
patient is scheduled at this time to receive 650 mg Tylenol, Toradol IV push 30 mg and 10mg Oxycodone IR. Is it safe to give all three medications at the same time. do you want them given all together?

## 2019-04-18 ENCOUNTER — TRANSCRIPTION ENCOUNTER (OUTPATIENT)
Age: 59
End: 2019-04-18

## 2019-04-18 VITALS
HEART RATE: 84 BPM | RESPIRATION RATE: 18 BRPM | DIASTOLIC BLOOD PRESSURE: 57 MMHG | TEMPERATURE: 99 F | SYSTOLIC BLOOD PRESSURE: 101 MMHG

## 2019-04-18 LAB
HCT VFR BLD CALC: 30.7 % — LOW (ref 42–52)
HGB BLD-MCNC: 10.2 G/DL — LOW (ref 14–18)
MCHC RBC-ENTMCNC: 30.1 PG — SIGNIFICANT CHANGE UP (ref 27–31)
MCHC RBC-ENTMCNC: 33.2 G/DL — SIGNIFICANT CHANGE UP (ref 32–37)
MCV RBC AUTO: 90.6 FL — SIGNIFICANT CHANGE UP (ref 80–94)
NRBC # BLD: 0 /100 WBCS — SIGNIFICANT CHANGE UP (ref 0–0)
PLATELET # BLD AUTO: 167 K/UL — SIGNIFICANT CHANGE UP (ref 130–400)
RBC # BLD: 3.39 M/UL — LOW (ref 4.7–6.1)
RBC # FLD: 13 % — SIGNIFICANT CHANGE UP (ref 11.5–14.5)
WBC # BLD: 10.77 K/UL — SIGNIFICANT CHANGE UP (ref 4.8–10.8)
WBC # FLD AUTO: 10.77 K/UL — SIGNIFICANT CHANGE UP (ref 4.8–10.8)

## 2019-04-18 RX ORDER — ASPIRIN/CALCIUM CARB/MAGNESIUM 324 MG
1 TABLET ORAL
Qty: 80 | Refills: 0
Start: 2019-04-18 | End: 2019-05-27

## 2019-04-18 RX ORDER — OXYCODONE HYDROCHLORIDE 5 MG/1
1 TABLET ORAL
Qty: 20 | Refills: 0
Start: 2019-04-18 | End: 2019-04-22

## 2019-04-18 RX ORDER — PANTOPRAZOLE SODIUM 20 MG/1
1 TABLET, DELAYED RELEASE ORAL
Qty: 40 | Refills: 0
Start: 2019-04-18 | End: 2019-05-27

## 2019-04-18 RX ADMIN — GABAPENTIN 100 MILLIGRAM(S): 400 CAPSULE ORAL at 05:43

## 2019-04-18 RX ADMIN — CELECOXIB 200 MILLIGRAM(S): 200 CAPSULE ORAL at 12:50

## 2019-04-18 RX ADMIN — OXYCODONE HYDROCHLORIDE 10 MILLIGRAM(S): 5 TABLET ORAL at 05:44

## 2019-04-18 RX ADMIN — Medication 30 MILLIGRAM(S): at 00:29

## 2019-04-18 RX ADMIN — Medication 650 MILLIGRAM(S): at 00:29

## 2019-04-18 RX ADMIN — Medication 650 MILLIGRAM(S): at 12:51

## 2019-04-18 RX ADMIN — Medication 1 TABLET(S): at 12:51

## 2019-04-18 RX ADMIN — LISINOPRIL 20 MILLIGRAM(S): 2.5 TABLET ORAL at 05:43

## 2019-04-18 RX ADMIN — Medication 650 MILLIGRAM(S): at 05:42

## 2019-04-18 RX ADMIN — Medication 30 MILLIGRAM(S): at 05:43

## 2019-04-18 RX ADMIN — CHLORHEXIDINE GLUCONATE 1 APPLICATION(S): 213 SOLUTION TOPICAL at 14:12

## 2019-04-18 RX ADMIN — Medication 100 MILLIGRAM(S): at 05:42

## 2019-04-18 RX ADMIN — Medication 325 MILLIGRAM(S): at 05:42

## 2019-04-18 RX ADMIN — Medication 100 MILLIGRAM(S): at 14:17

## 2019-04-18 RX ADMIN — Medication 30 MILLIGRAM(S): at 12:51

## 2019-04-18 RX ADMIN — Medication 650 MILLIGRAM(S): at 05:44

## 2019-04-18 RX ADMIN — GABAPENTIN 100 MILLIGRAM(S): 400 CAPSULE ORAL at 14:17

## 2019-04-18 RX ADMIN — Medication 325 MILLIGRAM(S): at 08:41

## 2019-04-18 RX ADMIN — OXYCODONE HYDROCHLORIDE 10 MILLIGRAM(S): 5 TABLET ORAL at 14:17

## 2019-04-18 RX ADMIN — PANTOPRAZOLE SODIUM 40 MILLIGRAM(S): 20 TABLET, DELAYED RELEASE ORAL at 05:42

## 2019-04-18 RX ADMIN — OXYCODONE HYDROCHLORIDE 10 MILLIGRAM(S): 5 TABLET ORAL at 05:43

## 2019-04-18 RX ADMIN — Medication 325 MILLIGRAM(S): at 12:50

## 2019-04-18 NOTE — DISCHARGE NOTE PROVIDER - NSDCCPGOAL_GEN_ALL_CORE_FT
To get better and follow your care plan as instructed, continue asa 325 mg bid 6 weeks postop for dvt ppx, PT WBAT, f/u as OP with dr Galarza in 2 weeks

## 2019-04-18 NOTE — PROVIDER CONTACT NOTE (MEDICATION) - ACTION/TREATMENT ORDERED:
MARIO Spence aware. as per MARIO Spence, no further intervention.
provider stated to give Toradol first then give oxy and Tylenol
providers stated that the will adjust orders accordingly and to please look out for them,

## 2019-04-18 NOTE — PROGRESS NOTE ADULT - SUBJECTIVE AND OBJECTIVE BOX
OPERATIVE PROCEDURE(s):                POD #  2 Left EVELINE                     58yMale  SURGEON(s): JACKLYN Galarza  SUBJECTIVE ASSESSMENT: resting in bed, has utilized walker requesting SNF referral  Vital Signs Last 24 Hrs  T(F): 97.5 (18 Apr 2019 07:56), Max: 98.5 (17 Apr 2019 15:24)  HR: 86 (18 Apr 2019 07:56) (74 - 86)  BP: 81/48 (18 Apr 2019 07:56) (81/48 - 112/60)  RR: 18 (18 Apr 2019 07:56) (16 - 18)    Physical Exam:  General: NAD; A&Ox3  Incision: dressing changed, wound clean and dry on signs of infection  Extremities: No significant edema b/l lower extremities; left calf soft, dorsi and plantar flexion intact, toes warm with sensory intact      LABS:                        10.2<L>  10.77 )-----------( 167      ( 18 Apr 2019 05:44 )             30.7<L>                        11.9<L>  11.60<H> )-----------( 197      ( 17 Apr 2019 06:04 )             36.0<L>    04-17    139  |  102  |  14  ----------------------------<  109<H>  4.5   |  26  |  0.9    Ca    8.4<L>      17 Apr 2019 06:04    MEDICATIONS  (STANDING):  acetaminophen   Tablet .. 650 milliGRAM(s) Oral every 6 hours  aspirin enteric coated 325 milliGRAM(s) Oral every 12 hours  celecoxib 200 milliGRAM(s) Oral daily  chlorhexidine 2% Cloths 1 Application(s) Topical daily  docusate sodium 100 milliGRAM(s) Oral three times a day  ferrous    sulfate 325 milliGRAM(s) Oral three times a day with meals  gabapentin 100 milliGRAM(s) Oral every 8 hours  ketorolac   Injectable 30 milliGRAM(s) IV Push every 6 hours  lisinopril 20 milliGRAM(s) Oral daily  multivitamin 1 Tablet(s) Oral daily  oxyCODONE    IR 10 milliGRAM(s) Oral every 4 hours  pantoprazole    Tablet 40 milliGRAM(s) Oral before breakfast    MEDICATIONS  (PRN):  bisacodyl Suppository 10 milliGRAM(s) Rectal daily PRN If no bowel movement by POD#2  ondansetron Injectable 4 milliGRAM(s) IV Push every 6 hours PRN Nausea and/or Vomiting  senna 2 Tablet(s) Oral at bedtime PRN Constipation    Allergies    No Known Allergies    Intolerances      Ambulation/Activity Status:    Assessment/Plan:  58y Male status-post L EVELINE  - Case and plan discussed with orthopedic team  - Continue DVT/GI prophylaxis  - Incentive Spirometry 10 times an hour  - Continue to advance physical activity weight bearing as tolerated LLE  - monitor H/H    Social Service Disposition:  in process, ? if he will meet SNF criteria

## 2019-04-18 NOTE — DISCHARGE NOTE PROVIDER - NSDCCPCAREPLAN_GEN_ALL_CORE_FT
PRINCIPAL DISCHARGE DIAGNOSIS  Diagnosis: History of total hip replacement, left  Assessment and Plan of Treatment:

## 2019-04-18 NOTE — DISCHARGE NOTE PROVIDER - CARE PROVIDER_API CALL
Que Galarza)  Orthopaedic Surgery  [address] [place] [address], [address]  [place] [address]  Phone: [phone]  Fax: [phone]  Follow Up Time:

## 2019-04-18 NOTE — DISCHARGE NOTE NURSING/CASE MANAGEMENT/SOCIAL WORK - NSDCDPATPORTLINK_GEN_ALL_CORE
You can access the [facility], offered by [facility], by registering with the following website: [email or web address]

## 2019-04-19 PROBLEM — I10 ESSENTIAL (PRIMARY) HYPERTENSION: Chronic | Status: ACTIVE | Noted: 2019-04-11

## 2019-04-19 PROBLEM — Z88.9 ALLERGY STATUS TO UNSPECIFIED DRUGS, MEDICAMENTS AND BIOLOGICAL SUBSTANCES: Chronic | Status: ACTIVE | Noted: 2019-04-11

## 2019-04-19 PROBLEM — G47.33 OBSTRUCTIVE SLEEP APNEA (ADULT) (PEDIATRIC): Chronic | Status: ACTIVE | Noted: 2019-04-11

## 2019-04-19 LAB — SURGICAL PATHOLOGY STUDY: SIGNIFICANT CHANGE UP

## 2019-04-23 ENCOUNTER — RX RENEWAL (OUTPATIENT)
Age: 59
End: 2019-04-23

## 2019-05-02 ENCOUNTER — APPOINTMENT (OUTPATIENT)
Dept: ORTHOPEDIC SURGERY | Facility: CLINIC | Age: 59
End: 2019-05-02
Payer: COMMERCIAL

## 2019-05-02 DIAGNOSIS — E66.9 OBESITY, UNSPECIFIED: ICD-10-CM

## 2019-05-02 DIAGNOSIS — M16.12 UNILATERAL PRIMARY OSTEOARTHRITIS, LEFT HIP: ICD-10-CM

## 2019-05-02 DIAGNOSIS — G47.33 OBSTRUCTIVE SLEEP APNEA (ADULT) (PEDIATRIC): ICD-10-CM

## 2019-05-02 DIAGNOSIS — I10 ESSENTIAL (PRIMARY) HYPERTENSION: ICD-10-CM

## 2019-05-02 DIAGNOSIS — Z48.02 ENCOUNTER FOR REMOVAL OF SUTURES: ICD-10-CM

## 2019-05-02 DIAGNOSIS — Z87.891 PERSONAL HISTORY OF NICOTINE DEPENDENCE: ICD-10-CM

## 2019-05-02 PROCEDURE — 99024 POSTOP FOLLOW-UP VISIT: CPT

## 2019-05-02 NOTE — PHYSICAL EXAM
[Cane] : ambulates with cane [Normal] : Alert and in no acute distress [de-identified] : Left Hip\par Inspection: no effusion\par Wounds: Incision is clean and dry, staples are intact\par Alignment: normal.\par Palpation: no specific tenderness on palpation.\par Leg examination: calf is soft and non-tender.\par  [de-identified] : Sensation grossly intact about left lower extremity. \par

## 2019-05-02 NOTE — ASSESSMENT
[FreeTextEntry1] : 58 year old male approximately two weeks status post left total hip replacement. Staples were removed from the incision site and steri-strips were applied. Pt was instructed he may shower, allowing the soap and water to run over the incision site, but not to directly scrub over the wound. Pt is to continue Aspirin 325 mg twice daily for DVT prophylaxis. Pt is to follow up in four weeks for reassessment. In the interim, if he develops any fevers, erythema, drainage from the incision site, or any concerning symptoms, we will see him prior to his scheduled appointment.\par

## 2019-05-02 NOTE — HISTORY OF PRESENT ILLNESS
[de-identified] : 58 year old male s/p left total hip replacement presents to the office today for his 2 week follow up. Pt is here for staple removal. He is ambulating today with a single cane. Pt reports continuing to do physical therapy at home with good mobility. He reports taking Oxycodone 10 mg and Tylenol with good pain control. He reports taking Aspirin 325 mg twice daily for DVT prophylaxis. Pt denies any fever, drainage from the incision site, chest pain, or shortness of breath.

## 2019-05-06 ENCOUNTER — RX RENEWAL (OUTPATIENT)
Age: 59
End: 2019-05-06

## 2019-05-29 ENCOUNTER — APPOINTMENT (OUTPATIENT)
Dept: ORTHOPEDIC SURGERY | Facility: CLINIC | Age: 59
End: 2019-05-29
Payer: COMMERCIAL

## 2019-05-29 PROCEDURE — 73502 X-RAY EXAM HIP UNI 2-3 VIEWS: CPT | Mod: LT

## 2019-05-29 PROCEDURE — 99024 POSTOP FOLLOW-UP VISIT: CPT

## 2019-05-29 RX ORDER — OXYCODONE 10 MG/1
10 TABLET ORAL EVERY 8 HOURS
Qty: 33 | Refills: 0 | Status: DISCONTINUED | COMMUNITY
Start: 2019-04-23 | End: 2019-05-29

## 2019-05-29 RX ORDER — IBUPROFEN 800 MG/1
800 TABLET, FILM COATED ORAL
Refills: 0 | Status: DISCONTINUED | COMMUNITY
End: 2019-05-29

## 2019-05-29 RX ORDER — MELOXICAM 15 MG/1
15 TABLET ORAL
Qty: 30 | Refills: 0 | Status: DISCONTINUED | COMMUNITY
Start: 2019-02-25 | End: 2019-05-29

## 2019-05-29 RX ORDER — TRAMADOL HYDROCHLORIDE 50 MG/1
50 TABLET, COATED ORAL
Refills: 0 | Status: DISCONTINUED | COMMUNITY
End: 2019-05-29

## 2019-05-29 RX ORDER — OXYCODONE 5 MG/1
5 TABLET ORAL EVERY 8 HOURS
Qty: 21 | Refills: 0 | Status: DISCONTINUED | COMMUNITY
Start: 2019-05-06 | End: 2019-05-29

## 2019-05-29 RX ORDER — DICLOFENAC POTASSIUM 50 MG/1
50 TABLET, COATED ORAL
Refills: 0 | Status: DISCONTINUED | COMMUNITY
Start: 2019-02-25 | End: 2019-05-29

## 2019-05-29 NOTE — ASSESSMENT
[FreeTextEntry1] : 58 year old male s/p left total hip replacement 6 weeks ago. He ambulates without a walker or cane. He denies any pain. His range of motion is excellent both actively and passively. Patient is doing very well, overall. We will see him back in about 6 weeks time for his 3 month follow up. In the interim, if any concerning symptoms are to occur, we will see him prior to his next scheduled appointment.

## 2019-05-29 NOTE — REVIEW OF SYSTEMS
[Negative] : Heme/Lymph [Joint Pain] : no joint pain [Joint Stiffness] : no joint stiffness [Joint Swelling] : no joint swelling

## 2019-05-29 NOTE — PHYSICAL EXAM
[Normal] : Alert and in no acute distress [2+] : left 2+ [de-identified] : Left Hip\par Inspection: no effusion\par Wounds: healed incision about the left hip\par Alignment: normal.\par Palpation: no specific tenderness on palpation.\par ROM: Full active and passive ROM. No pain with ROM. \par Muscle Test: 5/5 motor groups\par Leg examination: calf is soft and non-tender.\par  [de-identified] : Sensation grossly intact about bilateral lower extremities.\par

## 2019-05-29 NOTE — HISTORY OF PRESENT ILLNESS
[de-identified] : 58 year old male s/p left total hip replacement presents to the office today for his 6 week follow up. Pt is ambulating without a walker or cane today, but states he occasionally uses it when he is going to be doing slightly more strenuous activity. PT reports increased functionality in regard to his activities of daily living. He reports being able to ambulate without limitation. Pt reports doing very well overall.

## 2019-07-17 ENCOUNTER — APPOINTMENT (OUTPATIENT)
Dept: ORTHOPEDIC SURGERY | Facility: CLINIC | Age: 59
End: 2019-07-17
Payer: COMMERCIAL

## 2019-07-17 PROCEDURE — 73502 X-RAY EXAM HIP UNI 2-3 VIEWS: CPT | Mod: LT

## 2019-07-17 PROCEDURE — 99212 OFFICE O/P EST SF 10 MIN: CPT

## 2019-07-17 NOTE — ASSESSMENT
[FreeTextEntry1] : 3 month s/p LTH replacement. Pt is having no pain in the hip, but describes an odd sensation when he ambulates. I determined this to be his perception of a slight LLD. I explained to him this should resolve over the next 3 months. Pt to f/u in 3 months time.

## 2019-07-17 NOTE — REVIEW OF SYSTEMS
[Joint Stiffness] : joint stiffness [Negative] : Heme/Lymph [Joint Pain] : no joint pain [Joint Swelling] : no joint swelling

## 2019-07-17 NOTE — HISTORY OF PRESENT ILLNESS
[de-identified] : 58 year old male s/p left total hip replacement about 3 months ago presents to the office today for a follow up. Pt is ambulating without a walker, cane, or crutch. He denies much pain, but does report a sensation of tightness in his left quad. Pt reports being able to ambulate unlimited distances once he gets going. He also reports doing well with negotiating stairs. Pt denies taking anything for pain at all. Pt reports doing well overall.

## 2019-07-17 NOTE — PHYSICAL EXAM
[de-identified] : Left Hip\par Inspection: No effusion\par Wounds: Healed incision about the left hip, no drainage or erythema\par Alignment: Normal\par Stability: No instability\par Palpation: No specific tenderness on palpation\par ROM: 90 degrees of flexion  , 30 external roation, 0 degrees  internal rotation, 40 degrees abduction \par Muscle Test: 5/5 All motor groups\par Leg examination: Calf is soft and non-tender.\par Gait: When pt ambulates he vaults over his left leg consistent with a non compensated limb length discrepancy.  [de-identified] : Radiographs from 7/17/19 AP pelvis lateral of the left hip show the bony structures to be intact, he has a narrowed but maintained right hip joint space. He has a well aligned cementless left total hip replacement. His limb length was determined to be approx 3-5 mm on the left than the right.

## 2019-10-16 ENCOUNTER — APPOINTMENT (OUTPATIENT)
Dept: ORTHOPEDIC SURGERY | Facility: CLINIC | Age: 59
End: 2019-10-16
Payer: COMMERCIAL

## 2019-10-16 DIAGNOSIS — Z96.642 PRESENCE OF LEFT ARTIFICIAL HIP JOINT: ICD-10-CM

## 2019-10-16 PROCEDURE — 99214 OFFICE O/P EST MOD 30 MIN: CPT

## 2019-10-16 PROCEDURE — 73502 X-RAY EXAM HIP UNI 2-3 VIEWS: CPT | Mod: LT

## 2019-10-16 NOTE — HISTORY OF PRESENT ILLNESS
[de-identified] : 59 year old male s/p left total hip replacement presents to the office today for his 6 month follow up. Pt denies much pain, but does report being un comfortable with long periods of sitting. He reports being able to ambulate unlimited distances and is doing well with negotiating stairs. He is ambulating without a walker, cane, or crutch. Overall, patient reports doing well.

## 2019-10-16 NOTE — PHYSICAL EXAM
[de-identified] : Radiographs performed on October 16, 2019 AP pelvis and lateral of the left hip show the bony structures to be intact there's no fractures or dislocations. There's a well aligned cementless hip replacement with excellent interfaces no evidence of loosening or wear. There is any limb length discrepancy noted this is about 5-7 mm

## 2019-10-16 NOTE — ASSESSMENT
[FreeTextEntry1] : Patient comes in 6 months status post left total hip replacement. He really has 2 complaints the one that bothers him the most is pain in the buttock with prolonged sitting the other is he perceives a slight limb length discrepancy which she accommodates with a left inside his shoe. When I watched him ambulate he has barely a discernible limp. There is a very subtle will hold things over the left side because of his limb length discrepancy. I also tested his hip abductors on the left side they are still slightly weak so I gave him side lying hip abduction strengthening exercises to do on his own. The range of motion of the hip is excellent with flexion to 90 external rotation 30 and 45° of abduction. Radiographs lesion for imaging impression patient doing well he will continue to accommodate his limb length discrepancy with the insert in the shoe and he will do side lying hip abduction strengthening exercises we'll see him back in 6 months time

## 2020-01-24 ENCOUNTER — APPOINTMENT (OUTPATIENT)
Dept: ORTHOPEDIC SURGERY | Facility: CLINIC | Age: 60
End: 2020-01-24

## 2020-04-05 ENCOUNTER — EMERGENCY (EMERGENCY)
Facility: HOSPITAL | Age: 60
LOS: 0 days | Discharge: LEFT BEFORE DISCHARGE | End: 2020-04-05
Attending: EMERGENCY MEDICINE | Admitting: EMERGENCY MEDICINE
Payer: COMMERCIAL

## 2020-04-05 VITALS
HEIGHT: 72 IN | RESPIRATION RATE: 18 BRPM | OXYGEN SATURATION: 100 % | WEIGHT: 218.92 LBS | SYSTOLIC BLOOD PRESSURE: 179 MMHG | DIASTOLIC BLOOD PRESSURE: 84 MMHG | HEART RATE: 73 BPM | TEMPERATURE: 98 F

## 2020-04-05 DIAGNOSIS — Z98.84 BARIATRIC SURGERY STATUS: Chronic | ICD-10-CM

## 2020-04-05 DIAGNOSIS — Z90.49 ACQUIRED ABSENCE OF OTHER SPECIFIED PARTS OF DIGESTIVE TRACT: ICD-10-CM

## 2020-04-05 DIAGNOSIS — Z90.49 ACQUIRED ABSENCE OF OTHER SPECIFIED PARTS OF DIGESTIVE TRACT: Chronic | ICD-10-CM

## 2020-04-05 DIAGNOSIS — Z96.652 PRESENCE OF LEFT ARTIFICIAL KNEE JOINT: ICD-10-CM

## 2020-04-05 DIAGNOSIS — R07.89 OTHER CHEST PAIN: ICD-10-CM

## 2020-04-05 DIAGNOSIS — Z98.890 OTHER SPECIFIED POSTPROCEDURAL STATES: Chronic | ICD-10-CM

## 2020-04-05 DIAGNOSIS — Z98.890 OTHER SPECIFIED POSTPROCEDURAL STATES: ICD-10-CM

## 2020-04-05 DIAGNOSIS — R06.02 SHORTNESS OF BREATH: ICD-10-CM

## 2020-04-05 DIAGNOSIS — R42 DIZZINESS AND GIDDINESS: ICD-10-CM

## 2020-04-05 DIAGNOSIS — Z96.642 PRESENCE OF LEFT ARTIFICIAL HIP JOINT: Chronic | ICD-10-CM

## 2020-04-05 DIAGNOSIS — I10 ESSENTIAL (PRIMARY) HYPERTENSION: ICD-10-CM

## 2020-04-05 DIAGNOSIS — R07.9 CHEST PAIN, UNSPECIFIED: ICD-10-CM

## 2020-04-05 LAB
ALBUMIN SERPL ELPH-MCNC: 4.9 G/DL — SIGNIFICANT CHANGE UP (ref 3.5–5.2)
ALP SERPL-CCNC: 92 U/L — SIGNIFICANT CHANGE UP (ref 30–115)
ALT FLD-CCNC: 29 U/L — SIGNIFICANT CHANGE UP (ref 0–41)
ANION GAP SERPL CALC-SCNC: 19 MMOL/L — HIGH (ref 7–14)
AST SERPL-CCNC: 22 U/L — SIGNIFICANT CHANGE UP (ref 0–41)
BASE EXCESS BLDV CALC-SCNC: 1.2 MMOL/L — SIGNIFICANT CHANGE UP (ref -2–2)
BILIRUB SERPL-MCNC: 0.4 MG/DL — SIGNIFICANT CHANGE UP (ref 0.2–1.2)
BUN SERPL-MCNC: 20 MG/DL — SIGNIFICANT CHANGE UP (ref 10–20)
CA-I SERPL-SCNC: 1.19 MMOL/L — SIGNIFICANT CHANGE UP (ref 1.12–1.3)
CALCIUM SERPL-MCNC: 10.2 MG/DL — HIGH (ref 8.5–10.1)
CHLORIDE SERPL-SCNC: 97 MMOL/L — LOW (ref 98–110)
CO2 SERPL-SCNC: 23 MMOL/L — SIGNIFICANT CHANGE UP (ref 17–32)
CREAT SERPL-MCNC: 1.1 MG/DL — SIGNIFICANT CHANGE UP (ref 0.7–1.5)
GAS PNL BLDV: 142 MMOL/L — SIGNIFICANT CHANGE UP (ref 136–145)
GAS PNL BLDV: SIGNIFICANT CHANGE UP
GLUCOSE SERPL-MCNC: 120 MG/DL — HIGH (ref 70–99)
HCO3 BLDV-SCNC: 25 MMOL/L — SIGNIFICANT CHANGE UP (ref 22–29)
HCT VFR BLD CALC: 47.3 % — SIGNIFICANT CHANGE UP (ref 42–52)
HCT VFR BLDA CALC: 50.6 % — HIGH (ref 34–44)
HGB BLD CALC-MCNC: 16.5 G/DL — SIGNIFICANT CHANGE UP (ref 14–18)
HGB BLD-MCNC: 16 G/DL — SIGNIFICANT CHANGE UP (ref 14–18)
HOROWITZ INDEX BLDV+IHG-RTO: 21 — SIGNIFICANT CHANGE UP
LACTATE BLDV-MCNC: 4.3 MMOL/L — HIGH (ref 0.5–1.6)
MAGNESIUM SERPL-MCNC: 2.2 MG/DL — SIGNIFICANT CHANGE UP (ref 1.8–2.4)
MCHC RBC-ENTMCNC: 29.5 PG — SIGNIFICANT CHANGE UP (ref 27–31)
MCHC RBC-ENTMCNC: 33.8 G/DL — SIGNIFICANT CHANGE UP (ref 32–37)
MCV RBC AUTO: 87.1 FL — SIGNIFICANT CHANGE UP (ref 80–94)
NRBC # BLD: 0 /100 WBCS — SIGNIFICANT CHANGE UP (ref 0–0)
PCO2 BLDV: 35 MMHG — LOW (ref 41–51)
PH BLDV: 7.46 — HIGH (ref 7.26–7.43)
PLATELET # BLD AUTO: 245 K/UL — SIGNIFICANT CHANGE UP (ref 130–400)
PO2 BLDV: 30 MMHG — SIGNIFICANT CHANGE UP (ref 20–40)
POTASSIUM BLDV-SCNC: 3.8 MMOL/L — SIGNIFICANT CHANGE UP (ref 3.3–5.6)
POTASSIUM SERPL-MCNC: 4.1 MMOL/L — SIGNIFICANT CHANGE UP (ref 3.5–5)
POTASSIUM SERPL-SCNC: 4.1 MMOL/L — SIGNIFICANT CHANGE UP (ref 3.5–5)
PROT SERPL-MCNC: 8 G/DL — SIGNIFICANT CHANGE UP (ref 6–8)
RBC # BLD: 5.43 M/UL — SIGNIFICANT CHANGE UP (ref 4.7–6.1)
RBC # FLD: 13.7 % — SIGNIFICANT CHANGE UP (ref 11.5–14.5)
SAO2 % BLDV: 66 % — SIGNIFICANT CHANGE UP
SODIUM SERPL-SCNC: 139 MMOL/L — SIGNIFICANT CHANGE UP (ref 135–146)
TROPONIN T SERPL-MCNC: <0.01 NG/ML — SIGNIFICANT CHANGE UP
WBC # BLD: 12.58 K/UL — HIGH (ref 4.8–10.8)
WBC # FLD AUTO: 12.58 K/UL — HIGH (ref 4.8–10.8)

## 2020-04-05 PROCEDURE — 99285 EMERGENCY DEPT VISIT HI MDM: CPT

## 2020-04-05 PROCEDURE — 71045 X-RAY EXAM CHEST 1 VIEW: CPT | Mod: 26

## 2020-04-05 RX ORDER — TRAMADOL HYDROCHLORIDE 50 MG/1
0 TABLET ORAL
Qty: 0 | Refills: 0 | DISCHARGE

## 2020-04-05 NOTE — ED PROVIDER NOTE - ATTENDING CONTRIBUTION TO CARE
Pt with c/o sob and chest pressure now gone.  L:CTAB, CV:rrr, s1s2, Neg abd ttp.  Neg ext ttp or swelling.  a/p: labs, imaging, reassess

## 2020-04-05 NOTE — ED PROVIDER NOTE - PHYSICAL EXAMINATION
Physical Exam    Vital Signs: I have reviewed the initial vital signs.  Constitutional: well-nourished, appears stated age, no acute distress  Eyes: Conjunctiva pink, Sclera clear,   Cardiovascular: S1 and S2, regular rate, regular rhythm, well-perfused extremities, radial pulses equal and 2+, pedal pulses 2+ and equal   Respiratory: unlabored respiratory effort, clear to auscultation bilaterally no wheezing, rales and rhonchi  Gastrointestinal: soft, non-tender abdomen, no pulsatile mass, normal bowl sounds  Musculoskeletal: supple neck, no lower extremity edema, no midline tenderness  Integumentary: warm, dry, no rash  Neurologic: awake, alert, nvi

## 2020-04-05 NOTE — ED PROVIDER NOTE - CLINICAL SUMMARY MEDICAL DECISION MAKING FREE TEXT BOX
The patient wishes to leave against medical advice.  I have discussed the risks, benefits and alternatives (including the possibility of worsening of disease, pain, permanent disability, and/or death) with the patient and his/her family (if available).  The patient voices understanding of these risks, benefits, and alternatives and still wishes to sign out against medical advice.  The patient is awake, alert, oriented  x 3 and has demonstrated capacity to refuse/direct care.  I have advised the patient that they can and should return immediately should they develop any worse/different/additional symptoms, or if they change their mind and want to continue their care. Results were reviewed with the patient by me and copies were given to bring to follow-up.

## 2020-04-05 NOTE — ED ADULT NURSE NOTE - PSH
H/O bariatric surgery  s/p gastric sleeve  H/O repair of right rotator cuff    History of appendectomy    History of hip replacement, total, left

## 2020-04-05 NOTE — ED PROVIDER NOTE - OBJECTIVE STATEMENT
58 yo male, pmh of htn, presents to ed for cp, started this morning, midsternal, mild, aching, no radiation. Denies chills, fever, cough, sob, le swelling, abd pain, nvd.

## 2020-04-05 NOTE — ED PROVIDER NOTE - CARE PROVIDER_API CALL
[facility])  Cardiovascular Disease; Interventional Cardiology  [address] [place] [address]  Phone: [phone]  Fax: [phone]  Follow Up Time: 1-3 Days

## 2020-04-05 NOTE — ED PROVIDER NOTE - PATIENT PORTAL LINK FT
You can access the FollowMyHealth Patient Portal offered by [facility] by registering at the following website: [email or web address]. By joining [facility]’s FollowMyHealth portal, you will also be able to view your health information using other applications (apps) compatible with our system.

## 2020-05-21 ENCOUNTER — TRANSCRIPTION ENCOUNTER (OUTPATIENT)
Age: 60
End: 2020-05-21

## 2020-06-24 ENCOUNTER — APPOINTMENT (OUTPATIENT)
Dept: ORTHOPEDIC SURGERY | Facility: CLINIC | Age: 60
End: 2020-06-24

## 2020-09-27 ENCOUNTER — TRANSCRIPTION ENCOUNTER (OUTPATIENT)
Age: 60
End: 2020-09-27

## 2021-01-20 ENCOUNTER — OUTPATIENT (OUTPATIENT)
Dept: OUTPATIENT SERVICES | Facility: HOSPITAL | Age: 61
LOS: 1 days | Discharge: HOME | End: 2021-01-20
Payer: COMMERCIAL

## 2021-01-20 DIAGNOSIS — Z98.84 BARIATRIC SURGERY STATUS: Chronic | ICD-10-CM

## 2021-01-20 DIAGNOSIS — Z98.890 OTHER SPECIFIED POSTPROCEDURAL STATES: Chronic | ICD-10-CM

## 2021-01-20 DIAGNOSIS — Z96.642 PRESENCE OF LEFT ARTIFICIAL HIP JOINT: Chronic | ICD-10-CM

## 2021-01-20 DIAGNOSIS — Z90.49 ACQUIRED ABSENCE OF OTHER SPECIFIED PARTS OF DIGESTIVE TRACT: Chronic | ICD-10-CM

## 2021-01-20 DIAGNOSIS — R06.00 DYSPNEA, UNSPECIFIED: ICD-10-CM

## 2021-01-20 PROCEDURE — 71271 CT THORAX LUNG CANCER SCR C-: CPT | Mod: 26

## 2021-01-31 ENCOUNTER — OUTPATIENT (OUTPATIENT)
Dept: OUTPATIENT SERVICES | Facility: HOSPITAL | Age: 61
LOS: 1 days | Discharge: HOME | End: 2021-01-31

## 2021-01-31 DIAGNOSIS — Z98.890 OTHER SPECIFIED POSTPROCEDURAL STATES: Chronic | ICD-10-CM

## 2021-01-31 DIAGNOSIS — Z11.59 ENCOUNTER FOR SCREENING FOR OTHER VIRAL DISEASES: ICD-10-CM

## 2021-01-31 DIAGNOSIS — Z98.84 BARIATRIC SURGERY STATUS: Chronic | ICD-10-CM

## 2021-01-31 DIAGNOSIS — Z96.642 PRESENCE OF LEFT ARTIFICIAL HIP JOINT: Chronic | ICD-10-CM

## 2021-01-31 DIAGNOSIS — Z90.49 ACQUIRED ABSENCE OF OTHER SPECIFIED PARTS OF DIGESTIVE TRACT: Chronic | ICD-10-CM

## 2021-02-03 ENCOUNTER — OUTPATIENT (OUTPATIENT)
Dept: OUTPATIENT SERVICES | Facility: HOSPITAL | Age: 61
LOS: 1 days | Discharge: HOME | End: 2021-02-03

## 2021-02-03 ENCOUNTER — TRANSCRIPTION ENCOUNTER (OUTPATIENT)
Age: 61
End: 2021-02-03

## 2021-02-03 DIAGNOSIS — Z98.890 OTHER SPECIFIED POSTPROCEDURAL STATES: Chronic | ICD-10-CM

## 2021-02-03 DIAGNOSIS — Z98.84 BARIATRIC SURGERY STATUS: Chronic | ICD-10-CM

## 2021-02-03 DIAGNOSIS — Z96.642 PRESENCE OF LEFT ARTIFICIAL HIP JOINT: Chronic | ICD-10-CM

## 2021-02-03 DIAGNOSIS — Z90.49 ACQUIRED ABSENCE OF OTHER SPECIFIED PARTS OF DIGESTIVE TRACT: Chronic | ICD-10-CM

## 2021-02-04 DIAGNOSIS — G47.33 OBSTRUCTIVE SLEEP APNEA (ADULT) (PEDIATRIC): ICD-10-CM

## 2021-03-01 ENCOUNTER — NON-APPOINTMENT (OUTPATIENT)
Age: 61
End: 2021-03-01

## 2021-03-01 DIAGNOSIS — Z86.73 PERSONAL HISTORY OF TRANSIENT ISCHEMIC ATTACK (TIA), AND CEREBRAL INFARCTION W/OUT RESIDUAL DEFICITS: ICD-10-CM

## 2021-03-01 DIAGNOSIS — G47.33 OBSTRUCTIVE SLEEP APNEA (ADULT) (PEDIATRIC): ICD-10-CM

## 2021-03-01 DIAGNOSIS — Z82.49 FAMILY HISTORY OF ISCHEMIC HEART DISEASE AND OTHER DISEASES OF THE CIRCULATORY SYSTEM: ICD-10-CM

## 2021-03-03 DIAGNOSIS — I10 ESSENTIAL (PRIMARY) HYPERTENSION: ICD-10-CM

## 2021-03-04 ENCOUNTER — OUTPATIENT (OUTPATIENT)
Dept: OUTPATIENT SERVICES | Facility: HOSPITAL | Age: 61
LOS: 1 days | Discharge: HOME | End: 2021-03-04
Payer: COMMERCIAL

## 2021-03-04 DIAGNOSIS — Z98.84 BARIATRIC SURGERY STATUS: Chronic | ICD-10-CM

## 2021-03-04 DIAGNOSIS — Z98.890 OTHER SPECIFIED POSTPROCEDURAL STATES: Chronic | ICD-10-CM

## 2021-03-04 DIAGNOSIS — Z90.49 ACQUIRED ABSENCE OF OTHER SPECIFIED PARTS OF DIGESTIVE TRACT: Chronic | ICD-10-CM

## 2021-03-04 DIAGNOSIS — Z96.642 PRESENCE OF LEFT ARTIFICIAL HIP JOINT: Chronic | ICD-10-CM

## 2021-03-04 DIAGNOSIS — R07.9 CHEST PAIN, UNSPECIFIED: ICD-10-CM

## 2021-03-04 PROCEDURE — 75574 CT ANGIO HRT W/3D IMAGE: CPT | Mod: 26

## 2021-03-14 ENCOUNTER — OUTPATIENT (OUTPATIENT)
Dept: OUTPATIENT SERVICES | Facility: HOSPITAL | Age: 61
LOS: 1 days | Discharge: HOME | End: 2021-03-14

## 2021-03-14 DIAGNOSIS — Z96.642 PRESENCE OF LEFT ARTIFICIAL HIP JOINT: Chronic | ICD-10-CM

## 2021-03-14 DIAGNOSIS — Z11.59 ENCOUNTER FOR SCREENING FOR OTHER VIRAL DISEASES: ICD-10-CM

## 2021-03-14 DIAGNOSIS — Z98.84 BARIATRIC SURGERY STATUS: Chronic | ICD-10-CM

## 2021-03-14 DIAGNOSIS — Z98.890 OTHER SPECIFIED POSTPROCEDURAL STATES: Chronic | ICD-10-CM

## 2021-03-14 DIAGNOSIS — Z90.49 ACQUIRED ABSENCE OF OTHER SPECIFIED PARTS OF DIGESTIVE TRACT: Chronic | ICD-10-CM

## 2021-03-17 ENCOUNTER — OUTPATIENT (OUTPATIENT)
Dept: OUTPATIENT SERVICES | Facility: HOSPITAL | Age: 61
LOS: 1 days | Discharge: HOME | End: 2021-03-17

## 2021-03-17 ENCOUNTER — APPOINTMENT (OUTPATIENT)
Dept: PULMONOLOGY | Facility: CLINIC | Age: 61
End: 2021-03-17

## 2021-03-17 VITALS
OXYGEN SATURATION: 98 % | SYSTOLIC BLOOD PRESSURE: 115 MMHG | WEIGHT: 233.91 LBS | HEIGHT: 72 IN | RESPIRATION RATE: 16 BRPM | DIASTOLIC BLOOD PRESSURE: 70 MMHG | HEART RATE: 60 BPM

## 2021-03-17 DIAGNOSIS — Z96.642 PRESENCE OF LEFT ARTIFICIAL HIP JOINT: Chronic | ICD-10-CM

## 2021-03-17 DIAGNOSIS — Z90.49 ACQUIRED ABSENCE OF OTHER SPECIFIED PARTS OF DIGESTIVE TRACT: Chronic | ICD-10-CM

## 2021-03-17 DIAGNOSIS — Z98.890 OTHER SPECIFIED POSTPROCEDURAL STATES: Chronic | ICD-10-CM

## 2021-03-17 DIAGNOSIS — Z98.84 BARIATRIC SURGERY STATUS: Chronic | ICD-10-CM

## 2021-03-17 LAB
ANION GAP SERPL CALC-SCNC: 12 MMOL/L — SIGNIFICANT CHANGE UP (ref 7–14)
BUN SERPL-MCNC: 13 MG/DL — SIGNIFICANT CHANGE UP (ref 10–20)
CALCIUM SERPL-MCNC: 9.4 MG/DL — SIGNIFICANT CHANGE UP (ref 8.5–10.1)
CHLORIDE SERPL-SCNC: 103 MMOL/L — SIGNIFICANT CHANGE UP (ref 98–110)
CO2 SERPL-SCNC: 24 MMOL/L — SIGNIFICANT CHANGE UP (ref 17–32)
CREAT SERPL-MCNC: 0.9 MG/DL — SIGNIFICANT CHANGE UP (ref 0.7–1.5)
GLUCOSE SERPL-MCNC: 108 MG/DL — HIGH (ref 70–99)
HCT VFR BLD CALC: 44.4 % — SIGNIFICANT CHANGE UP (ref 42–52)
HGB BLD-MCNC: 15.1 G/DL — SIGNIFICANT CHANGE UP (ref 14–18)
MCHC RBC-ENTMCNC: 30.4 PG — SIGNIFICANT CHANGE UP (ref 27–31)
MCHC RBC-ENTMCNC: 34 G/DL — SIGNIFICANT CHANGE UP (ref 32–37)
MCV RBC AUTO: 89.5 FL — SIGNIFICANT CHANGE UP (ref 80–94)
NRBC # BLD: 0 /100 WBCS — SIGNIFICANT CHANGE UP (ref 0–0)
PLATELET # BLD AUTO: 234 K/UL — SIGNIFICANT CHANGE UP (ref 130–400)
POTASSIUM SERPL-MCNC: 4.6 MMOL/L — SIGNIFICANT CHANGE UP (ref 3.5–5)
POTASSIUM SERPL-SCNC: 4.6 MMOL/L — SIGNIFICANT CHANGE UP (ref 3.5–5)
RBC # BLD: 4.96 M/UL — SIGNIFICANT CHANGE UP (ref 4.7–6.1)
RBC # FLD: 13.3 % — SIGNIFICANT CHANGE UP (ref 11.5–14.5)
SODIUM SERPL-SCNC: 139 MMOL/L — SIGNIFICANT CHANGE UP (ref 135–146)
WBC # BLD: 11.23 K/UL — HIGH (ref 4.8–10.8)
WBC # FLD AUTO: 11.23 K/UL — HIGH (ref 4.8–10.8)

## 2021-03-17 RX ORDER — RAMIPRIL 5 MG
1 CAPSULE ORAL
Qty: 0 | Refills: 0 | DISCHARGE

## 2021-03-17 NOTE — H&P CARDIOLOGY - HISTORY OF PRESENT ILLNESS
59 y/o male presents here today for [facility] d/t c/o dyspnea on exertion and +CCTA with CAD-RADS of 5.     Pre cath note:    indication:  [ ] STEMI                [ ] NSTEMI                 [ ] Acute coronary syndrome                     [ ]Unstable Angina   [ ] high risk  [ ] intermediate risk  [ ] low risk                     [ ] Stable Angina     non-invasive testing:       CCTA             Date:        3/5/21             result: [ ] high risk  [x ] intermediate risk  [ ] low risk    Anti- Anginal medications:                    [x ] not used                       [ ] used                   [ ] not used but strong indication not to use    Ejection Fraction                   [ ] <29            [ ] 30-39%   [ ] 40-49%     [ ]>50%    CHF                   [ ] active (within last 14 days on meds   [ ] Chronic (on meds but no exacerbation)    COPD                   [ ] mild (on chronic bronchodilators)  [ ] moderate (on chronic steroid therapy)      [ ] severe (indication for home O2 or PACO2 >50)    Other risk factors:                       [ ] Previous MI                     [ ] CVA/ stroke                    [ ] carotid stent/ CEA                    [ ] PVD/PAD- (arterial aneurysm, non-palpable pulses, tortuous vessel with inability to insert catheter, infra-renal dissection, renal or subclavian artery stenosis)                    [ ] diabetic                    [ ] previous CABG                    [ ] Renal Failure     Adjusted CathPCI Bleeding Event Risk: 1.2%    RIGHT RADIAL ARTERY EVALUATION:  CARINE TEST: WNL

## 2021-03-17 NOTE — H&P CARDIOLOGY - PMH
H/O seasonal allergies    Hearing loss of left ear, unspecified hearing loss type    HTN (hypertension)    Obesity  s/p gastric sleeve ~ 2013  LINDA (obstructive sleep apnea)

## 2021-03-17 NOTE — CHART NOTE - NSCHARTNOTEFT_GEN_A_CORE
PRE-OP DIAGNOSIS: Chest Pain, Abnormal CCTA    PROCEDURE: Left Heart Cath, LV Gram, Bilateral coronary angiography    Physician: Dr. Olmedo  Assistant: Terrell Garcia DO, MD    ANESTHESIA TYPE:  [  ]General Anesthesia  [ X ] Sedation  [ X ] Local/Regional    CONDITION  [  ] Critical  [  ] Serious  [  ]Fair  [ X ]Good      IV CONTRAST:  100           mL      FINDINGS    Left Heart Catheterization  LVEDP: 16 mm Hg    LV Gram  Estimated EF: 60%    Coronary Angiography  Left dominant system                                 Left main: angiographically normal    LAD:     proximal: luminal irregularities  mid: 100% occlusion right after major diagonal branch with an early take of  distal: fills via right to left and left to left collaterals                     Diag 1: medium caliber vessel, angiographically normal supplies left to left collaterals to LAD  Diag 2: small caliber which fills via left to left collaterals    Left Circumflex: luminal irregularities in proximal portion and focal 40% stenosis at bifurcation of Om1, Distal LCx is angiographically normal  OM1: medium caliber with 60% ostial stenosis  LPDA: angiographically normal    Right Coronary Artery: small non dominant with mild luminal irregularities    Ramus Intermedius: medium caliber, with luminal irregularities in superior branch    POST-OP DIAGNOSIS  CAD        PLAN OF CARE  [ X ] D/C Home today  Plan for  of mid LAD in the near future

## 2021-03-18 ENCOUNTER — INPATIENT (INPATIENT)
Facility: HOSPITAL | Age: 61
LOS: 0 days | Discharge: HOME | End: 2021-03-19
Attending: INTERNAL MEDICINE | Admitting: INTERNAL MEDICINE
Payer: COMMERCIAL

## 2021-03-18 VITALS
SYSTOLIC BLOOD PRESSURE: 120 MMHG | HEIGHT: 72 IN | HEART RATE: 88 BPM | OXYGEN SATURATION: 100 % | DIASTOLIC BLOOD PRESSURE: 69 MMHG | RESPIRATION RATE: 17 BRPM | TEMPERATURE: 98 F | WEIGHT: 233.91 LBS

## 2021-03-18 DIAGNOSIS — Z98.84 BARIATRIC SURGERY STATUS: Chronic | ICD-10-CM

## 2021-03-18 DIAGNOSIS — Z96.642 PRESENCE OF LEFT ARTIFICIAL HIP JOINT: Chronic | ICD-10-CM

## 2021-03-18 DIAGNOSIS — Z98.890 OTHER SPECIFIED POSTPROCEDURAL STATES: Chronic | ICD-10-CM

## 2021-03-18 DIAGNOSIS — Z90.49 ACQUIRED ABSENCE OF OTHER SPECIFIED PARTS OF DIGESTIVE TRACT: Chronic | ICD-10-CM

## 2021-03-18 PROBLEM — H91.92 UNSPECIFIED HEARING LOSS, LEFT EAR: Chronic | Status: ACTIVE | Noted: 2021-03-17

## 2021-03-18 LAB
ALBUMIN SERPL ELPH-MCNC: 4.1 G/DL — SIGNIFICANT CHANGE UP (ref 3.5–5.2)
ALP SERPL-CCNC: 69 U/L — SIGNIFICANT CHANGE UP (ref 30–115)
ALT FLD-CCNC: 28 U/L — SIGNIFICANT CHANGE UP (ref 0–41)
ANION GAP SERPL CALC-SCNC: 12 MMOL/L — SIGNIFICANT CHANGE UP (ref 7–14)
AST SERPL-CCNC: 20 U/L — SIGNIFICANT CHANGE UP (ref 0–41)
BASOPHILS # BLD AUTO: 0.07 K/UL — SIGNIFICANT CHANGE UP (ref 0–0.2)
BASOPHILS NFR BLD AUTO: 0.7 % — SIGNIFICANT CHANGE UP (ref 0–1)
BILIRUB SERPL-MCNC: 0.5 MG/DL — SIGNIFICANT CHANGE UP (ref 0.2–1.2)
BUN SERPL-MCNC: 14 MG/DL — SIGNIFICANT CHANGE UP (ref 10–20)
CALCIUM SERPL-MCNC: 9.2 MG/DL — SIGNIFICANT CHANGE UP (ref 8.5–10.1)
CHLORIDE SERPL-SCNC: 104 MMOL/L — SIGNIFICANT CHANGE UP (ref 98–110)
CO2 SERPL-SCNC: 23 MMOL/L — SIGNIFICANT CHANGE UP (ref 17–32)
CREAT SERPL-MCNC: 0.8 MG/DL — SIGNIFICANT CHANGE UP (ref 0.7–1.5)
EOSINOPHIL # BLD AUTO: 0.23 K/UL — SIGNIFICANT CHANGE UP (ref 0–0.7)
EOSINOPHIL NFR BLD AUTO: 2.1 % — SIGNIFICANT CHANGE UP (ref 0–8)
GLUCOSE SERPL-MCNC: 98 MG/DL — SIGNIFICANT CHANGE UP (ref 70–99)
HCT VFR BLD CALC: 44.1 % — SIGNIFICANT CHANGE UP (ref 42–52)
HGB BLD-MCNC: 14.8 G/DL — SIGNIFICANT CHANGE UP (ref 14–18)
IMM GRANULOCYTES NFR BLD AUTO: 0.4 % — HIGH (ref 0.1–0.3)
LYMPHOCYTES # BLD AUTO: 2.48 K/UL — SIGNIFICANT CHANGE UP (ref 1.2–3.4)
LYMPHOCYTES # BLD AUTO: 23.2 % — SIGNIFICANT CHANGE UP (ref 20.5–51.1)
MAGNESIUM SERPL-MCNC: 2.1 MG/DL — SIGNIFICANT CHANGE UP (ref 1.8–2.4)
MCHC RBC-ENTMCNC: 29.9 PG — SIGNIFICANT CHANGE UP (ref 27–31)
MCHC RBC-ENTMCNC: 33.6 G/DL — SIGNIFICANT CHANGE UP (ref 32–37)
MCV RBC AUTO: 89.1 FL — SIGNIFICANT CHANGE UP (ref 80–94)
MONOCYTES # BLD AUTO: 0.82 K/UL — HIGH (ref 0.1–0.6)
MONOCYTES NFR BLD AUTO: 7.7 % — SIGNIFICANT CHANGE UP (ref 1.7–9.3)
NEUTROPHILS # BLD AUTO: 7.06 K/UL — HIGH (ref 1.4–6.5)
NEUTROPHILS NFR BLD AUTO: 65.9 % — SIGNIFICANT CHANGE UP (ref 42.2–75.2)
NRBC # BLD: 0 /100 WBCS — SIGNIFICANT CHANGE UP (ref 0–0)
NT-PROBNP SERPL-SCNC: 152 PG/ML — SIGNIFICANT CHANGE UP (ref 0–300)
PLATELET # BLD AUTO: 230 K/UL — SIGNIFICANT CHANGE UP (ref 130–400)
POTASSIUM SERPL-MCNC: 4.3 MMOL/L — SIGNIFICANT CHANGE UP (ref 3.5–5)
POTASSIUM SERPL-SCNC: 4.3 MMOL/L — SIGNIFICANT CHANGE UP (ref 3.5–5)
PROT SERPL-MCNC: 6.9 G/DL — SIGNIFICANT CHANGE UP (ref 6–8)
RBC # BLD: 4.95 M/UL — SIGNIFICANT CHANGE UP (ref 4.7–6.1)
RBC # FLD: 13.2 % — SIGNIFICANT CHANGE UP (ref 11.5–14.5)
SARS-COV-2 RNA SPEC QL NAA+PROBE: SIGNIFICANT CHANGE UP
SODIUM SERPL-SCNC: 139 MMOL/L — SIGNIFICANT CHANGE UP (ref 135–146)
TROPONIN T SERPL-MCNC: <0.01 NG/ML — SIGNIFICANT CHANGE UP
WBC # BLD: 10.7 K/UL — SIGNIFICANT CHANGE UP (ref 4.8–10.8)
WBC # FLD AUTO: 10.7 K/UL — SIGNIFICANT CHANGE UP (ref 4.8–10.8)

## 2021-03-18 PROCEDURE — 99285 EMERGENCY DEPT VISIT HI MDM: CPT

## 2021-03-18 PROCEDURE — 99223 1ST HOSP IP/OBS HIGH 75: CPT

## 2021-03-18 PROCEDURE — 71045 X-RAY EXAM CHEST 1 VIEW: CPT | Mod: 26

## 2021-03-18 PROCEDURE — 93010 ELECTROCARDIOGRAM REPORT: CPT

## 2021-03-18 RX ORDER — ATORVASTATIN CALCIUM 80 MG/1
40 TABLET, FILM COATED ORAL DAILY
Refills: 0 | Status: DISCONTINUED | OUTPATIENT
Start: 2021-03-18 | End: 2021-03-19

## 2021-03-18 RX ORDER — CETIRIZINE HYDROCHLORIDE 10 MG/1
1 TABLET ORAL
Qty: 0 | Refills: 0 | DISCHARGE

## 2021-03-18 RX ORDER — ATORVASTATIN CALCIUM 80 MG/1
1 TABLET, FILM COATED ORAL
Qty: 0 | Refills: 0 | DISCHARGE

## 2021-03-18 RX ORDER — ASPIRIN/CALCIUM CARB/MAGNESIUM 324 MG
325 TABLET ORAL DAILY
Refills: 0 | Status: DISCONTINUED | OUTPATIENT
Start: 2021-03-18 | End: 2021-03-19

## 2021-03-18 RX ORDER — LISINOPRIL 2.5 MG/1
10 TABLET ORAL DAILY
Refills: 0 | Status: DISCONTINUED | OUTPATIENT
Start: 2021-03-18 | End: 2021-03-19

## 2021-03-18 RX ORDER — ENOXAPARIN SODIUM 100 MG/ML
40 INJECTION SUBCUTANEOUS AT BEDTIME
Refills: 0 | Status: DISCONTINUED | OUTPATIENT
Start: 2021-03-18 | End: 2021-03-19

## 2021-03-18 RX ORDER — METOPROLOL TARTRATE 50 MG
12.5 TABLET ORAL
Refills: 0 | Status: DISCONTINUED | OUTPATIENT
Start: 2021-03-18 | End: 2021-03-19

## 2021-03-18 RX ADMIN — ENOXAPARIN SODIUM 40 MILLIGRAM(S): 100 INJECTION SUBCUTANEOUS at 23:12

## 2021-03-18 NOTE — H&P ADULT - NSHPPHYSICALEXAM_GEN_ALL_CORE
GENERAL: NAD, Resting in bed  HEENT: NCAT  CHEST/LUNG: Clear to auscultation bilaterally; No wheezing or rubs.   HEART: Regular rate and rhythm; No murmurs, rubs, or gallops  ABDOMEN: Bowel sounds present; Soft, Nontender, Nondistended.   EXTREMITIES:  No clubbing, cyanosis, or edema  NERVOUS SYSTEM:  Alert & Oriented X3  MSK: FROM all 4 extremities, full and equal strength  SKIN: No rashes or lesions GENERAL: NAD, Resting in bed  HEENT: NCAT  CHEST/LUNG: Clear to auscultation bilaterally; No wheezing or rubs.   HEART: Regular rate and rhythm; No murmurs, rubs, or gallops  ABDOMEN: Bowel sounds present; Soft, Nontender, Nondistended.   EXTREMITIES:  No clubbing, cyanosis, or edema  NERVOUS SYSTEM:  Alert & Oriented X3

## 2021-03-18 NOTE — H&P ADULT - NSICDXPASTSURGICALHX_GEN_ALL_CORE_FT
PAST SURGICAL HISTORY:  H/O bariatric surgery s/p gastric sleeve    H/O repair of right rotator cuff     History of appendectomy     History of hip replacement, total, left

## 2021-03-18 NOTE — ED PROVIDER NOTE - OBJECTIVE STATEMENT
59 y/o M, PMHx HTN, LINDA, CAD, presents to the ED with complaints of chest pain this afternoon.   Patient is s/p cardiac catheterization by Dr. Breaux yesterday (after CCTA revealed a CAD-RADS 5) that revealed:   CAD:     proximal: luminal irregularities  mid: 100% occlusion right after major diagonal branch with an early take of  distal: fills via right to left and left to left collaterals                     Diag 1: medium caliber vessel, angiographically normal supplies left to left collaterals to LAD  Diag 2: small caliber which fills via left to left collaterals    Left Circumflex: luminal irregularities in proximal portion and focal 40% stenosis at bifurcation of Om1, Distal LCx is angiographically normal  OM1: medium caliber with 60% ostial stenosis  LPDA: angiographically normal    Right Coronary Artery: small non dominant with mild luminal irregularities    Ramus Intermedius: medium caliber, with luminal irregularities in superior branch    Plan for  of mid LAD in the near future    Patient states that while driving today, he experienced a burning sensation along his left chest with accompanying dizziness that lasted approx 20 minutes and has since resolved. He denies dyspnea, nausea, vomiting, cephalgia, weaknees, abdominal pain and back pain. He took ASA 325mg this AM.

## 2021-03-18 NOTE — H&P ADULT - NSHPLABSRESULTS_GEN_ALL_CORE
14.8   10.70 )-----------( 230      ( 18 Mar 2021 14:28 )             44.1       03-18    139  |  104  |  14  ----------------------------<  98  4.3   |  23  |  0.8    Ca    9.2      18 Mar 2021 14:28  Mg     2.1     03-18    TPro  6.9  /  Alb  4.1  /  TBili  0.5  /  DBili  x   /  AST  20  /  ALT  28  /  AlkPhos  69  03-18        CARDIAC MARKERS ( 18 Mar 2021 14:28 )  x     / <0.01 ng/mL / x     / x     / x

## 2021-03-18 NOTE — H&P ADULT - NSICDXPASTMEDICALHX_GEN_ALL_CORE_FT
PAST MEDICAL HISTORY:  CAD (coronary artery disease)     H/O seasonal allergies     Hearing loss of left ear, unspecified hearing loss type     HTN (hypertension)     Obesity s/p gastric sleeve ~ 2013    LINDA (obstructive sleep apnea)

## 2021-03-18 NOTE — H&P ADULT - ASSESSMENT
60M w/ PMHx of HTN, CAD, LINDA, and Gastric Bypass presents w/ c/o Chest pain.    # Chest pain likely due to ischemic heart  # CAD      # HTN - stable, c/w ramipril  # LINDA    # DVT ppx - Lovenox   # GI ppx - PPI   # Diet - DASH  Full code.  60M w/ PMHx of HTN, CAD, LINDA, and Gastric Bypass presents w/ c/o Chest pain.    # SOB/Dizziness/Bradycardia - likely medicine induced  - EKG: sinus earl cardia    - Pt was on lopressor 50mg BID and recently had is dose reduced to 25mg BID  - will decrease is further to 12.5 BID instead of holding to prevent rebound   - Cont telemetry     # CAD  # Chest pressure likely from ischemic heart  - Cath yesterday positive - plan for repeat cath vs CABG   - Consult cardiology  - f/u Echo, trend Trops   - cont , BB, ACEi and Statin    # HTN - stable, c/w ramipril  # LINDA - c/w CPAP    # DVT ppx - Lovenox   # GI ppx - PPI   # Diet - DASH  Full code.  60M w/ PMHx of HTN, CAD, LINDA, and Gastric Bypass presents w/ c/o SOB and Dizziness.    # SOB/Dizziness/Bradycardia - likely medicine induced  - EKG: sinus earl cardia    - Pt was on lopressor 50mg BID and recently had is dose reduced to 25mg BID  - will decrease is further to 12.5 BID instead of holding to prevent rebound   - Cont telemetry     # CAD  # Chest pressure likely from ischemic heart  - Cath yesterday positive - plan for repeat cath vs CABG   - Consult cardiology  - f/u Echo, trend Trops   - cont , BB, ACEi and Statin    # HTN - stable, c/w ramipril  # LINDA - c/w CPAP    # DVT ppx - Lovenox   # GI ppx - PPI   # Diet - DASH  Full code.

## 2021-03-18 NOTE — CONSULT NOTE ADULT - SUBJECTIVE AND OBJECTIVE BOX
Patient is a 60y old  Male who presents with a chief complaint of SOB (18 Mar 2021 18:18)      HPI:  60M w/ PMHx of HTN, CAD, LINDA, and Gastric Bypass presents w/ c/o SOB and Dizziness. He reports he was waiting for his wife in the car and he all of a sudden experienced SOB and dizziness, no LOC. Never experienced the similar episode before. Currently he denies HA, fatigue, fever, chills, dizziness, change in vision, CP, SOB, cough, wheezing, abd pain, nausea, vomiting, diarrhea, constipation, blood in stool or urine, and dysuria.     of note: pt had a LHC yesterday (3/18/21 - see report) and was planned for cath again.     ED vitals: /69 HR 88 RR 17 T 98f SPO2 100% on RA  EKG: sinus earl cardia    Pt was on lopressor 50mg BID and recently had is dose reduced to 25mg BID (18 Mar 2021 18:18)      PAST MEDICAL & SURGICAL HISTORY:  CAD (coronary artery disease)    Hearing loss of left ear, unspecified hearing loss type    H/O seasonal allergies    Obesity  s/p gastric sleeve ~ 2013    HTN (hypertension)    LINDA (obstructive sleep apnea)    History of hip replacement, total, left    History of appendectomy    H/O repair of right rotator cuff    H/O bariatric surgery  s/p gastric sleeve        PREVIOUS DIAGNOSTIC TESTING:      ECHO  FINDINGS:    STRESS  FINDINGS:    CATHETERIZATION  FINDINGS:    MEDICATIONS  (STANDING):  aspirin 325 milliGRAM(s) Oral daily  atorvastatin Oral Tab/Cap - Peds 40 milliGRAM(s) Oral daily  enoxaparin Injectable 40 milliGRAM(s) SubCutaneous at bedtime  lisinopril 10 milliGRAM(s) Oral daily  metoprolol tartrate 12.5 milliGRAM(s) Oral two times a day    MEDICATIONS  (PRN):      FAMILY HISTORY:  FH: type 2 diabetes  Mother        SOCIAL HISTORY:  CIGARETTES:    ALCOHOL:    REVIEW OF SYSTEMS:  CONSTITUTIONAL: No fever, weight loss, or fatigue  NECK: No pain or stiffness  RESPIRATORY: No cough, wheezing, chills or hemoptysis; No shortness of breath  CARDIOVASCULAR: No chest pain, palpitations, dizziness, or leg swelling  GASTROINTESTINAL: No abdominal or epigastric pain. No nausea, vomiting, or hematemesis; No diarrhea or constipation. No melena or hematochezia.  GENITOURINARY: No dysuria, frequency, hematuria, or incontinence  NEUROLOGICAL: No headaches, memory loss, loss of strength, numbness, or tremors  SKIN: No itching, burning, rashes, or lesions   ENDOCRINE: No heat or cold intolerance; No hair loss  MUSCULOSKELETAL: No joint pain or swelling; No muscle, back, or extremity pain  HEME/LYMPH: No easy bruising, or bleeding gums          Vital Signs Last 24 Hrs  T(C): 36.7 (18 Mar 2021 13:59), Max: 36.7 (18 Mar 2021 13:59)  T(F): 98 (18 Mar 2021 13:59), Max: 98 (18 Mar 2021 13:59)  HR: 88 (18 Mar 2021 13:59) (88 - 88)  BP: 120/69 (18 Mar 2021 13:59) (120/69 - 120/69)  BP(mean): --  RR: 17 (18 Mar 2021 13:59) (17 - 17)  SpO2: 100% (18 Mar 2021 13:59) (100% - 100%)        PHYSICAL EXAM:  GENERAL: NAD, well-groomed, well-developed  HEAD:  Atraumatic, Normocephalic  NECK: Supple, No JVD, Normal thyroid  NERVOUS SYSTEM:  Alert & Oriented X3, Good concentration  CHEST/LUNG: Clear to percussion bilaterally; No rales, rhonchi, wheezing, or rubs  HEART: Regular rate and rhythm; No murmurs, rubs, or gallops  ABDOMEN: Soft, Nontender, Nondistended; Bowel sounds present  EXTREMITIES:  2+ Peripheral Pulses, No clubbing, cyanosis, or edema  SKIN: No rashes or lesions    INTERPRETATION OF TELEMETRY:    ECG:    I&O's Detail      LABS:                        14.8   10.70 )-----------( 230      ( 18 Mar 2021 14:28 )             44.1     03-18    139  |  104  |  14  ----------------------------<  98  4.3   |  23  |  0.8    Ca    9.2      18 Mar 2021 14:28  Mg     2.1     03-18    TPro  6.9  /  Alb  4.1  /  TBili  0.5  /  DBili  x   /  AST  20  /  ALT  28  /  AlkPhos  69  03-18    CARDIAC MARKERS ( 18 Mar 2021 14:28 )  x     / <0.01 ng/mL / x     / x     / x              I&O's Summary      RADIOLOGY & ADDITIONAL STUDIES:

## 2021-03-18 NOTE — CONSULT NOTE ADULT - ASSESSMENT
presents   with SOB   lightheaded      chest pain    had cardiac cath   3/17     LAD   planned   intervention    next week   CE s neg      will schedule for   intervention  LAD  monday     covid   test   saturday   cont meds

## 2021-03-18 NOTE — ED ADULT TRIAGE NOTE - CHIEF COMPLAINT QUOTE
mid level chest pain for a while, took ASA 325mg today, EKG in progress. Arrived with 20G to left AC and received 250CC NS

## 2021-03-18 NOTE — H&P ADULT - HISTORY OF PRESENT ILLNESS
60M w/ PMHx of HTN, CAD, LINDA, and Gastric Bypass presents w/ c/o Chest pain. Patient states that while driving today, he experienced a burning sensation along his left chest with accompanying dizziness that lasted approx 20 minutes and has since resolved. He took ASA 325mg this AM.     of note: pt had a LHC yesterday (3/18/21 - see report) and was planned for cath again.     ED vitals: /69 HR 88 RR 17 T 98f SPO2 100% on RA  EKG: sinus earl cardia   60M w/ PMHx of HTN, CAD, LINDA, and Gastric Bypass presents w/ c/o SOB and Dizziness. He reports he was waiting for his wife in the car and he all of a sudden experienced SOB and dizziness, no LOC. Never experienced the similar episode before. Currently he denies HA, fatigue, fever, chills, dizziness, change in vision, CP, SOB, cough, wheezing, abd pain, nausea, vomiting, diarrhea, constipation, blood in stool or urine, and dysuria.     of note: pt had a LHC yesterday (3/18/21 - see report) and was planned for cath again.     ED vitals: /69 HR 88 RR 17 T 98f SPO2 100% on RA  EKG: sinus earl cardia    Pt was on lopressor 50mg BID and recently had is dose reduced to 25mg BID

## 2021-03-18 NOTE — ED PROVIDER NOTE - CLINICAL SUMMARY MEDICAL DECISION MAKING FREE TEXT BOX
60 yr old m that presents with chest pain. labs, ekg, cxr obtained. pt with known 100% occlusion to the mid lad. pt admitted to medicine for acs.

## 2021-03-18 NOTE — ED ADULT NURSE NOTE - PMH
CAD (coronary artery disease)    H/O seasonal allergies    Hearing loss of left ear, unspecified hearing loss type    HTN (hypertension)    Obesity  s/p gastric sleeve ~ 2013  LINDA (obstructive sleep apnea)

## 2021-03-18 NOTE — H&P ADULT - NSICDXFAMILYHX_GEN_ALL_CORE_FT
No pertinent family history in first degree relatives FAMILY HISTORY:  FH: type 2 diabetes, Mother

## 2021-03-19 ENCOUNTER — TRANSCRIPTION ENCOUNTER (OUTPATIENT)
Age: 61
End: 2021-03-19

## 2021-03-19 VITALS
OXYGEN SATURATION: 98 % | HEART RATE: 50 BPM | SYSTOLIC BLOOD PRESSURE: 121 MMHG | DIASTOLIC BLOOD PRESSURE: 70 MMHG | RESPIRATION RATE: 18 BRPM | TEMPERATURE: 98 F

## 2021-03-19 LAB
ALBUMIN SERPL ELPH-MCNC: 4.2 G/DL — SIGNIFICANT CHANGE UP (ref 3.5–5.2)
ALP SERPL-CCNC: 68 U/L — SIGNIFICANT CHANGE UP (ref 30–115)
ALT FLD-CCNC: 30 U/L — SIGNIFICANT CHANGE UP (ref 0–41)
ANION GAP SERPL CALC-SCNC: 13 MMOL/L — SIGNIFICANT CHANGE UP (ref 7–14)
AST SERPL-CCNC: 25 U/L — SIGNIFICANT CHANGE UP (ref 0–41)
BASOPHILS # BLD AUTO: 0.05 K/UL — SIGNIFICANT CHANGE UP (ref 0–0.2)
BASOPHILS NFR BLD AUTO: 0.5 % — SIGNIFICANT CHANGE UP (ref 0–1)
BILIRUB SERPL-MCNC: 0.8 MG/DL — SIGNIFICANT CHANGE UP (ref 0.2–1.2)
BUN SERPL-MCNC: 15 MG/DL — SIGNIFICANT CHANGE UP (ref 10–20)
CALCIUM SERPL-MCNC: 9.5 MG/DL — SIGNIFICANT CHANGE UP (ref 8.5–10.1)
CHLORIDE SERPL-SCNC: 101 MMOL/L — SIGNIFICANT CHANGE UP (ref 98–110)
CO2 SERPL-SCNC: 24 MMOL/L — SIGNIFICANT CHANGE UP (ref 17–32)
CREAT SERPL-MCNC: 0.9 MG/DL — SIGNIFICANT CHANGE UP (ref 0.7–1.5)
EOSINOPHIL # BLD AUTO: 0.24 K/UL — SIGNIFICANT CHANGE UP (ref 0–0.7)
EOSINOPHIL NFR BLD AUTO: 2.2 % — SIGNIFICANT CHANGE UP (ref 0–8)
GLUCOSE SERPL-MCNC: 125 MG/DL — HIGH (ref 70–99)
HCT VFR BLD CALC: 44.2 % — SIGNIFICANT CHANGE UP (ref 42–52)
HCV AB S/CO SERPL IA: 0.03 COI — SIGNIFICANT CHANGE UP
HCV AB SERPL-IMP: SIGNIFICANT CHANGE UP
HGB BLD-MCNC: 15 G/DL — SIGNIFICANT CHANGE UP (ref 14–18)
IMM GRANULOCYTES NFR BLD AUTO: 0.4 % — HIGH (ref 0.1–0.3)
LYMPHOCYTES # BLD AUTO: 2.81 K/UL — SIGNIFICANT CHANGE UP (ref 1.2–3.4)
LYMPHOCYTES # BLD AUTO: 26.3 % — SIGNIFICANT CHANGE UP (ref 20.5–51.1)
MAGNESIUM SERPL-MCNC: 2.2 MG/DL — SIGNIFICANT CHANGE UP (ref 1.8–2.4)
MCHC RBC-ENTMCNC: 30.4 PG — SIGNIFICANT CHANGE UP (ref 27–31)
MCHC RBC-ENTMCNC: 33.9 G/DL — SIGNIFICANT CHANGE UP (ref 32–37)
MCV RBC AUTO: 89.5 FL — SIGNIFICANT CHANGE UP (ref 80–94)
MONOCYTES # BLD AUTO: 0.8 K/UL — HIGH (ref 0.1–0.6)
MONOCYTES NFR BLD AUTO: 7.5 % — SIGNIFICANT CHANGE UP (ref 1.7–9.3)
NEUTROPHILS # BLD AUTO: 6.73 K/UL — HIGH (ref 1.4–6.5)
NEUTROPHILS NFR BLD AUTO: 63.1 % — SIGNIFICANT CHANGE UP (ref 42.2–75.2)
NRBC # BLD: 0 /100 WBCS — SIGNIFICANT CHANGE UP (ref 0–0)
PLATELET # BLD AUTO: 217 K/UL — SIGNIFICANT CHANGE UP (ref 130–400)
POTASSIUM SERPL-MCNC: 4.2 MMOL/L — SIGNIFICANT CHANGE UP (ref 3.5–5)
POTASSIUM SERPL-SCNC: 4.2 MMOL/L — SIGNIFICANT CHANGE UP (ref 3.5–5)
PROT SERPL-MCNC: 6.9 G/DL — SIGNIFICANT CHANGE UP (ref 6–8)
RBC # BLD: 4.94 M/UL — SIGNIFICANT CHANGE UP (ref 4.7–6.1)
RBC # FLD: 13.3 % — SIGNIFICANT CHANGE UP (ref 11.5–14.5)
SODIUM SERPL-SCNC: 138 MMOL/L — SIGNIFICANT CHANGE UP (ref 135–146)
TROPONIN T SERPL-MCNC: <0.01 NG/ML — SIGNIFICANT CHANGE UP
WBC # BLD: 10.67 K/UL — SIGNIFICANT CHANGE UP (ref 4.8–10.8)
WBC # FLD AUTO: 10.67 K/UL — SIGNIFICANT CHANGE UP (ref 4.8–10.8)

## 2021-03-19 PROCEDURE — 99239 HOSP IP/OBS DSCHRG MGMT >30: CPT

## 2021-03-19 RX ORDER — METOPROLOL TARTRATE 50 MG
1 TABLET ORAL
Qty: 0 | Refills: 0 | DISCHARGE

## 2021-03-19 RX ADMIN — LISINOPRIL 10 MILLIGRAM(S): 2.5 TABLET ORAL at 06:25

## 2021-03-19 NOTE — PROGRESS NOTE ADULT - ASSESSMENT
59 y/o man with PMH of HTN, CAD (cath on 3/17 showed  of mid LAD), LINDA and Gastric Bypass presented with dizziness, SOB and chest pain on 3/18.  EKG reviewed (sinus earl), troponins negative and pt without recurrent symptoms now. Will hold metoprolol since he remains earl even off the medication (approved by cardiology). Plan now is for intervention on LAD next Thursday. He is cleared by cardiology for discharge home today and outpt f/u.  Continue ASA/statin and ACE-I  Medication reconciliation and discharge papers reviewed    spent 35 minutes on the discharge process of this pt

## 2021-03-19 NOTE — DISCHARGE NOTE PROVIDER - HOSPITAL COURSE
60M w/ PMHx of HTN, CAD, LINDA, and Gastric Bypass presents w/ c/o SOB and Dizziness. He reports he was waiting for his wife in the car and he all of a sudden experienced SOB and dizziness, no LOC. Never experienced the similar episode before. Currently he denies HA, fatigue, fever, chills, dizziness, change in vision, CP, SOB, cough, wheezing, abd pain, nausea, vomiting, diarrhea, constipation, blood in stool or urine, and dysuria.   Of note: pt had a LHC yesterday (3/18/21 - see report) and was planned for cath again.     ED vitals: /69 HR 88 RR 17 T 98f SPO2 100% on RA  EKG: sinus earl cardia    Pt was on lopressor 50mg BID and recently had is dose reduced to 25mg BID and was held in ED due to HR 50s.  Pt. was seen by cardio and is planned to have CATH next week.     `PHYSICAL EXAM: on day of exam  GENERAL: NAD, speaks in full sentences, no signs of respiratory distress  CHEST/LUNG: Clear to auscultation bilaterally; No wheeze; No crackles; No accessory muscles used  HEART: bradycardic  ABDOMEN: Soft, Nontender, Nondistended; Bowel sounds present; No guarding  EXTREMITIES:  2+ Peripheral Pulses, No cyanosis or edema  PSYCH: AAOx3  NEUROLOGY: non-focal  SKIN: No rashes or lesions   60M w/ PMHx of HTN, CAD, LINDA, and Gastric Bypass presents w/ c/o SOB and Dizziness. He reports he was waiting for his wife in the car and he all of a sudden experienced SOB and dizziness, no LOC. Never experienced the similar episode before. Currently he denies HA, fatigue, fever, chills, dizziness, change in vision, CP, SOB, cough, wheezing, abd pain, nausea, vomiting, diarrhea, constipation, blood in stool or urine, and dysuria.   Of note: pt had a LHC yesterday (3/18/21 - see report) and was planned for cath again.     ED vitals: /69 HR 88 RR 17 T 98f SPO2 100% on RA  EKG: sinus ealr cardia    Pt was on lopressor 50mg BID and recently had is dose reduced to 25mg BID and was held in ED due to HR 50s. Spoke to Dr. Son > as HR 48-50 without any BB given ok to hold of for one week until planned cath and they will reassess.  Pt. was seen by cardio and is planned to have CATH next week.     `PHYSICAL EXAM: on day of exam  GENERAL: NAD, speaks in full sentences, no signs of respiratory distress  CHEST/LUNG: Clear to auscultation bilaterally; No wheeze; No crackles; No accessory muscles used  HEART: bradycardic  ABDOMEN: Soft, Nontender, Nondistended; Bowel sounds present; No guarding  EXTREMITIES:  2+ Peripheral Pulses, No cyanosis or edema  PSYCH: AAOx3  NEUROLOGY: non-focal  SKIN: No rashes or lesions   60M w/ PMHx of HTN, CAD, LINDA, and Gastric Bypass presents w/ c/o SOB and Dizziness. He reports he was waiting for his wife in the car and he all of a sudden experienced SOB and dizziness, no LOC. Never experienced the similar episode before. Currently he denies HA, fatigue, fever, chills, dizziness, change in vision, CP, SOB, cough, wheezing, abd pain, nausea, vomiting, diarrhea, constipation, blood in stool or urine, and dysuria.   Of note: pt had a LHC yesterday (3/17/21 - see report) and was planned for cath again.     ED vitals: /69 HR 88 RR 17 T 98f SPO2 100% on RA  EKG: sinus earl cardia    Pt was on lopressor 50mg BID and recently had is dose reduced to 25mg BID and was held in ED due to HR 50s. Spoke to Dr. Son > as HR 48-50 without any BB given ok to hold of for one week until planned cath and they will reassess.  Pt. was seen by cardio and is planned to have CATH next week.     `PHYSICAL EXAM: on day of exam  GENERAL: NAD, speaks in full sentences, no signs of respiratory distress  CHEST/LUNG: Clear to auscultation bilaterally; No wheeze; No crackles; No accessory muscles used  HEART: bradycardic  ABDOMEN: Soft, Nontender, Nondistended; Bowel sounds present; No guarding  EXTREMITIES:  2+ Peripheral Pulses, No cyanosis or edema  PSYCH: AAOx3  NEUROLOGY: non-focal  SKIN: No rashes or lesions

## 2021-03-19 NOTE — DISCHARGE NOTE PROVIDER - NSDCMRMEDTOKEN_GEN_ALL_CORE_FT
aspirin 325 mg oral tablet: 1 tab(s) orally once a day  atorvastatin 40 mg oral tablet: 1 tab(s) orally once a day  Metoprolol Tartrate 25 mg oral tablet: 1 tab(s) orally 2 times a day  ramipril 2.5 mg oral capsule: 1 cap(s) orally once a day   aspirin 325 mg oral tablet: 1 tab(s) orally once a day  atorvastatin 40 mg oral tablet: 1 tab(s) orally once a day  ramipril 2.5 mg oral capsule: 1 cap(s) orally once a day

## 2021-03-19 NOTE — DISCHARGE NOTE NURSING/CASE MANAGEMENT/SOCIAL WORK - PATIENT PORTAL LINK FT
You can access the FollowMyHealth Patient Portal offered by [facility] by registering at the following website: [email or web address]. By joining [employer]’s FollowMyHealth portal, you will also be able to view your health information using other applications (apps) compatible with our system.

## 2021-03-19 NOTE — DISCHARGE NOTE PROVIDER - CARE PROVIDER_API CALL
Aj Olmedo  Cardiovascular Disease  [address] [place] [address]  Phone: [phone]  Fax: [phone]  Follow Up Time:

## 2021-03-19 NOTE — DISCHARGE NOTE PROVIDER - NSDCCPCAREPLAN_GEN_ALL_CORE_FT
PRINCIPAL DISCHARGE DIAGNOSIS  Diagnosis: Dizziness  Assessment and Plan of Treatment: Symptomatic dizziness from heart medication. Your medications were lowered. You have a scheduled cardiac Cath next week, please return to hospital for planned procedure.

## 2021-03-19 NOTE — PROGRESS NOTE ADULT - SUBJECTIVE AND OBJECTIVE BOX
Discharge note    ROBER HUDSON  60y Male    INTERVAL HPI/OVERNIGHT EVENTS:    Pt feels well and wants to go home.  Ambulating and denies chest pain, SOB or dizziness  HR in the 45 to low 50 range at rest  He never received metoprolol this admission  Case discussed with his cardiologist Dr. Medley who cleared him for discharge.  Plan is for procedure for  of LAD on Thursday.    T(F): 97.1 (03-19-21 @ 06:21), Max: 98 (03-18-21 @ 13:59)  HR: 51 (03-19-21 @ 06:21) (50 - 88)  BP: 111/56 (03-19-21 @ 06:21) (111/56 - 120/69)  RR: 17 (03-19-21 @ 06:21) (17 - 18)  SpO2: 98% (03-19-21 @ 06:21) (98% - 100%) on RA    PHYSICAL EXAM:  GENERAL: NAD  HEAD:  Normocephalic  EYES:  conjunctiva and sclera clear  ENMT: Moist mucous membranes  NECK: Supple  NERVOUS SYSTEM:  Alert & Oriented X3, Good concentration  CHEST/LUNG: Clear to percussion bilaterally; No rales, rhonchi, wheezing  HEART: earl  ABDOMEN: Soft, Nontender, Nondistended; Bowel sounds present  EXTREMITIES:   No edema      Consultant(s) Notes Reviewed:  [x ] YES  [ ] NO  Care Discussed with Consultants/Other Providers [ x] YES  [ ] NO    MEDICATIONS  (STANDING):  aspirin 325 milliGRAM(s) Oral daily  atorvastatin Oral Tab/Cap - Peds 40 milliGRAM(s) Oral daily  enoxaparin Injectable 40 milliGRAM(s) SubCutaneous at bedtime  lisinopril 10 milliGRAM(s) Oral daily  metoprolol tartrate 12.5 milliGRAM(s) Oral two times a day    MEDICATIONS  (PRN):      LABS:                        15.0   10.67 )-----------( 217      ( 19 Mar 2021 05:31 )             44.2     03-19    138  |  101  |  15  ----------------------------<  125<H>  4.2   |  24  |  0.9    Ca    9.5      19 Mar 2021 05:31  Mg     2.2     03-19    TPro  6.9  /  Alb  4.2  /  TBili  0.8  /  DBili  x   /  AST  25  /  ALT  30  /  AlkPhos  68  03-19          RADIOLOGY & ADDITIONAL TESTS:    Imaging or report Personally Reviewed:  [ ] YES  [ ] NO    Left Heart Catheterization  LVEDP: 16 mm Hg    LV Gram  Estimated EF: 60%    Coronary Angiography  Left dominant system                                 Left main: angiographically normal    LAD:     proximal: luminal irregularities  mid: 100% occlusion right after major diagonal branch with an early take of  distal: fills via right to left and left to left collaterals                     Diag 1: medium caliber vessel, angiographically normal supplies left to left collaterals to LAD  Diag 2: small caliber which fills via left to left collaterals    Left Circumflex: luminal irregularities in proximal portion and focal 40% stenosis at bifurcation of Om1, Distal LCx is angiographically normal  OM1: medium caliber with 60% ostial stenosis  LPDA: angiographically normal    Right Coronary Artery: small non dominant with mild luminal irregularities    Ramus Intermedius: medium caliber, with luminal irregularities in superior branch    POST-OP DIAGNOSIS  CAD        PLAN OF CARE  [ X ] D/C Home today  Plan for  of mid LAD in the near future.    Case discussed with resident    Care discussed with pt

## 2021-03-22 ENCOUNTER — OUTPATIENT (OUTPATIENT)
Dept: OUTPATIENT SERVICES | Facility: HOSPITAL | Age: 61
LOS: 1 days | Discharge: HOME | End: 2021-03-22

## 2021-03-22 ENCOUNTER — LABORATORY RESULT (OUTPATIENT)
Age: 61
End: 2021-03-22

## 2021-03-22 DIAGNOSIS — Z98.890 OTHER SPECIFIED POSTPROCEDURAL STATES: Chronic | ICD-10-CM

## 2021-03-22 DIAGNOSIS — Z98.84 BARIATRIC SURGERY STATUS: Chronic | ICD-10-CM

## 2021-03-22 DIAGNOSIS — Z11.59 ENCOUNTER FOR SCREENING FOR OTHER VIRAL DISEASES: ICD-10-CM

## 2021-03-22 DIAGNOSIS — Z90.49 ACQUIRED ABSENCE OF OTHER SPECIFIED PARTS OF DIGESTIVE TRACT: Chronic | ICD-10-CM

## 2021-03-22 DIAGNOSIS — Z96.642 PRESENCE OF LEFT ARTIFICIAL HIP JOINT: Chronic | ICD-10-CM

## 2021-03-22 PROBLEM — I25.10 ATHEROSCLEROTIC HEART DISEASE OF NATIVE CORONARY ARTERY WITHOUT ANGINA PECTORIS: Chronic | Status: ACTIVE | Noted: 2021-03-18

## 2021-03-25 ENCOUNTER — INPATIENT (INPATIENT)
Facility: HOSPITAL | Age: 61
LOS: 2 days | Discharge: HOME HEALTH | End: 2021-03-28
Attending: THORACIC SURGERY (CARDIOTHORACIC VASCULAR SURGERY) | Admitting: THORACIC SURGERY (CARDIOTHORACIC VASCULAR SURGERY)
Payer: COMMERCIAL

## 2021-03-25 ENCOUNTER — TRANSCRIPTION ENCOUNTER (OUTPATIENT)
Age: 61
End: 2021-03-25

## 2021-03-25 VITALS — HEART RATE: 59 BPM | OXYGEN SATURATION: 100 %

## 2021-03-25 DIAGNOSIS — E78.5 HYPERLIPIDEMIA, UNSPECIFIED: ICD-10-CM

## 2021-03-25 DIAGNOSIS — Z98.84 BARIATRIC SURGERY STATUS: ICD-10-CM

## 2021-03-25 DIAGNOSIS — Z96.642 PRESENCE OF LEFT ARTIFICIAL HIP JOINT: ICD-10-CM

## 2021-03-25 DIAGNOSIS — F17.210 NICOTINE DEPENDENCE, CIGARETTES, UNCOMPLICATED: ICD-10-CM

## 2021-03-25 DIAGNOSIS — I25.10 ATHEROSCLEROTIC HEART DISEASE OF NATIVE CORONARY ARTERY WITHOUT ANGINA PECTORIS: ICD-10-CM

## 2021-03-25 DIAGNOSIS — R06.00 DYSPNEA, UNSPECIFIED: ICD-10-CM

## 2021-03-25 DIAGNOSIS — R42 DIZZINESS AND GIDDINESS: ICD-10-CM

## 2021-03-25 DIAGNOSIS — J30.9 ALLERGIC RHINITIS, UNSPECIFIED: ICD-10-CM

## 2021-03-25 DIAGNOSIS — Z96.642 PRESENCE OF LEFT ARTIFICIAL HIP JOINT: Chronic | ICD-10-CM

## 2021-03-25 DIAGNOSIS — R00.1 BRADYCARDIA, UNSPECIFIED: ICD-10-CM

## 2021-03-25 DIAGNOSIS — Z79.82 LONG TERM (CURRENT) USE OF ASPIRIN: ICD-10-CM

## 2021-03-25 DIAGNOSIS — I10 ESSENTIAL (PRIMARY) HYPERTENSION: ICD-10-CM

## 2021-03-25 DIAGNOSIS — Z98.84 BARIATRIC SURGERY STATUS: Chronic | ICD-10-CM

## 2021-03-25 DIAGNOSIS — Z90.49 ACQUIRED ABSENCE OF OTHER SPECIFIED PARTS OF DIGESTIVE TRACT: Chronic | ICD-10-CM

## 2021-03-25 DIAGNOSIS — I25.118 ATHEROSCLEROTIC HEART DISEASE OF NATIVE CORONARY ARTERY WITH OTHER FORMS OF ANGINA PECTORIS: ICD-10-CM

## 2021-03-25 DIAGNOSIS — R07.9 CHEST PAIN, UNSPECIFIED: ICD-10-CM

## 2021-03-25 DIAGNOSIS — G47.33 OBSTRUCTIVE SLEEP APNEA (ADULT) (PEDIATRIC): ICD-10-CM

## 2021-03-25 DIAGNOSIS — H91.92 UNSPECIFIED HEARING LOSS, LEFT EAR: ICD-10-CM

## 2021-03-25 DIAGNOSIS — E66.9 OBESITY, UNSPECIFIED: ICD-10-CM

## 2021-03-25 DIAGNOSIS — T44.7X5A ADVERSE EFFECT OF BETA-ADRENORECEPTOR ANTAGONISTS, INITIAL ENCOUNTER: ICD-10-CM
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
## 2021-03-25 DIAGNOSIS — Z98.890 OTHER SPECIFIED POSTPROCEDURAL STATES: Chronic | ICD-10-CM

## 2021-03-25 LAB
ALBUMIN SERPL ELPH-MCNC: 3.7 G/DL — SIGNIFICANT CHANGE UP (ref 3.5–5.2)
ALP SERPL-CCNC: 37 U/L — SIGNIFICANT CHANGE UP (ref 30–115)
ALT FLD-CCNC: 26 U/L — SIGNIFICANT CHANGE UP (ref 0–41)
ANION GAP SERPL CALC-SCNC: 11 MMOL/L — SIGNIFICANT CHANGE UP (ref 7–14)
ANION GAP SERPL CALC-SCNC: 9 MMOL/L — SIGNIFICANT CHANGE UP (ref 7–14)
ANION GAP SERPL CALC-SCNC: 9 MMOL/L — SIGNIFICANT CHANGE UP (ref 7–14)
ANISOCYTOSIS BLD QL: SLIGHT — SIGNIFICANT CHANGE UP
APTT BLD: 25.4 SEC — LOW (ref 27–39.2)
APTT BLD: 43.4 SEC — HIGH (ref 27–39.2)
AST SERPL-CCNC: 24 U/L — SIGNIFICANT CHANGE UP (ref 0–41)
BASOPHILS # BLD AUTO: 0 K/UL — SIGNIFICANT CHANGE UP (ref 0–0.2)
BASOPHILS NFR BLD AUTO: 0 % — SIGNIFICANT CHANGE UP (ref 0–1)
BILIRUB SERPL-MCNC: 1 MG/DL — SIGNIFICANT CHANGE UP (ref 0.2–1.2)
BLD GP AB SCN SERPL QL: SIGNIFICANT CHANGE UP
BUN SERPL-MCNC: 11 MG/DL — SIGNIFICANT CHANGE UP (ref 10–20)
BUN SERPL-MCNC: 11 MG/DL — SIGNIFICANT CHANGE UP (ref 10–20)
BUN SERPL-MCNC: 13 MG/DL — SIGNIFICANT CHANGE UP (ref 10–20)
CALCIUM SERPL-MCNC: 7 MG/DL — LOW (ref 8.5–10.1)
CALCIUM SERPL-MCNC: 7.3 MG/DL — LOW (ref 8.5–10.1)
CALCIUM SERPL-MCNC: 9.3 MG/DL — SIGNIFICANT CHANGE UP (ref 8.5–10.1)
CHLORIDE SERPL-SCNC: 103 MMOL/L — SIGNIFICANT CHANGE UP (ref 98–110)
CHLORIDE SERPL-SCNC: 109 MMOL/L — SIGNIFICANT CHANGE UP (ref 98–110)
CHLORIDE SERPL-SCNC: 110 MMOL/L — SIGNIFICANT CHANGE UP (ref 98–110)
CO2 SERPL-SCNC: 20 MMOL/L — SIGNIFICANT CHANGE UP (ref 17–32)
CO2 SERPL-SCNC: 21 MMOL/L — SIGNIFICANT CHANGE UP (ref 17–32)
CO2 SERPL-SCNC: 23 MMOL/L — SIGNIFICANT CHANGE UP (ref 17–32)
CREAT SERPL-MCNC: 0.6 MG/DL — LOW (ref 0.7–1.5)
CREAT SERPL-MCNC: 0.7 MG/DL — SIGNIFICANT CHANGE UP (ref 0.7–1.5)
CREAT SERPL-MCNC: 0.8 MG/DL — SIGNIFICANT CHANGE UP (ref 0.7–1.5)
EOSINOPHIL # BLD AUTO: 0.36 K/UL — SIGNIFICANT CHANGE UP (ref 0–0.7)
EOSINOPHIL NFR BLD AUTO: 1.7 % — SIGNIFICANT CHANGE UP (ref 0–8)
GAS PNL BLDA: SIGNIFICANT CHANGE UP
GIANT PLATELETS BLD QL SMEAR: PRESENT — SIGNIFICANT CHANGE UP
GLUCOSE BLDC GLUCOMTR-MCNC: 120 MG/DL — HIGH (ref 70–99)
GLUCOSE BLDC GLUCOMTR-MCNC: 132 MG/DL — HIGH (ref 70–99)
GLUCOSE BLDC GLUCOMTR-MCNC: 134 MG/DL — HIGH (ref 70–99)
GLUCOSE BLDC GLUCOMTR-MCNC: 83 MG/DL — SIGNIFICANT CHANGE UP (ref 70–99)
GLUCOSE SERPL-MCNC: 108 MG/DL — HIGH (ref 70–99)
GLUCOSE SERPL-MCNC: 123 MG/DL — HIGH (ref 70–99)
GLUCOSE SERPL-MCNC: 96 MG/DL — SIGNIFICANT CHANGE UP (ref 70–99)
HCT VFR BLD CALC: 31.9 % — LOW (ref 42–52)
HCT VFR BLD CALC: 37.1 % — LOW (ref 42–52)
HCT VFR BLD CALC: 42.7 % — SIGNIFICANT CHANGE UP (ref 42–52)
HGB BLD-MCNC: 10.8 G/DL — LOW (ref 14–18)
HGB BLD-MCNC: 12.8 G/DL — LOW (ref 14–18)
HGB BLD-MCNC: 14.7 G/DL — SIGNIFICANT CHANGE UP (ref 14–18)
INR BLD: 1.3 RATIO — SIGNIFICANT CHANGE UP (ref 0.65–1.3)
INR BLD: 1.47 RATIO — HIGH (ref 0.65–1.3)
LYMPHOCYTES # BLD AUTO: 1.82 K/UL — SIGNIFICANT CHANGE UP (ref 1.2–3.4)
LYMPHOCYTES # BLD AUTO: 8.7 % — LOW (ref 20.5–51.1)
MAGNESIUM SERPL-MCNC: 1.7 MG/DL — LOW (ref 1.8–2.4)
MANUAL SMEAR VERIFICATION: SIGNIFICANT CHANGE UP
MCHC RBC-ENTMCNC: 30.3 PG — SIGNIFICANT CHANGE UP (ref 27–31)
MCHC RBC-ENTMCNC: 30.6 PG — SIGNIFICANT CHANGE UP (ref 27–31)
MCHC RBC-ENTMCNC: 30.6 PG — SIGNIFICANT CHANGE UP (ref 27–31)
MCHC RBC-ENTMCNC: 33.9 G/DL — SIGNIFICANT CHANGE UP (ref 32–37)
MCHC RBC-ENTMCNC: 34.4 G/DL — SIGNIFICANT CHANGE UP (ref 32–37)
MCHC RBC-ENTMCNC: 34.5 G/DL — SIGNIFICANT CHANGE UP (ref 32–37)
MCV RBC AUTO: 88.8 FL — SIGNIFICANT CHANGE UP (ref 80–94)
MCV RBC AUTO: 88.8 FL — SIGNIFICANT CHANGE UP (ref 80–94)
MCV RBC AUTO: 89.6 FL — SIGNIFICANT CHANGE UP (ref 80–94)
METAMYELOCYTES # FLD: 0.9 % — HIGH (ref 0–0)
MICROCYTES BLD QL: SLIGHT — SIGNIFICANT CHANGE UP
MONOCYTES # BLD AUTO: 0.36 K/UL — SIGNIFICANT CHANGE UP (ref 0.1–0.6)
MONOCYTES NFR BLD AUTO: 1.7 % — SIGNIFICANT CHANGE UP (ref 1.7–9.3)
NEUTROPHILS # BLD AUTO: 17.99 K/UL — HIGH (ref 1.4–6.5)
NEUTROPHILS NFR BLD AUTO: 81.7 % — HIGH (ref 42.2–75.2)
NEUTS BAND # BLD: 4.4 % — SIGNIFICANT CHANGE UP (ref 0–6)
NRBC # BLD: 0 /100 WBCS — SIGNIFICANT CHANGE UP (ref 0–0)
NRBC # BLD: 0 /100 WBCS — SIGNIFICANT CHANGE UP (ref 0–0)
PLAT MORPH BLD: NORMAL — SIGNIFICANT CHANGE UP
PLATELET # BLD AUTO: 129 K/UL — LOW (ref 130–400)
PLATELET # BLD AUTO: 193 K/UL — SIGNIFICANT CHANGE UP (ref 130–400)
PLATELET # BLD AUTO: 223 K/UL — SIGNIFICANT CHANGE UP (ref 130–400)
POTASSIUM SERPL-MCNC: 4.3 MMOL/L — SIGNIFICANT CHANGE UP (ref 3.5–5)
POTASSIUM SERPL-MCNC: 4.3 MMOL/L — SIGNIFICANT CHANGE UP (ref 3.5–5)
POTASSIUM SERPL-MCNC: 4.9 MMOL/L — SIGNIFICANT CHANGE UP (ref 3.5–5)
POTASSIUM SERPL-SCNC: 4.3 MMOL/L — SIGNIFICANT CHANGE UP (ref 3.5–5)
POTASSIUM SERPL-SCNC: 4.3 MMOL/L — SIGNIFICANT CHANGE UP (ref 3.5–5)
POTASSIUM SERPL-SCNC: 4.9 MMOL/L — SIGNIFICANT CHANGE UP (ref 3.5–5)
PROMYELOCYTES # FLD: 0.9 % — HIGH (ref 0–0)
PROT SERPL-MCNC: 5.1 G/DL — LOW (ref 6–8)
PROTHROM AB SERPL-ACNC: 14.9 SEC — HIGH (ref 9.95–12.87)
PROTHROM AB SERPL-ACNC: 16.9 SEC — HIGH (ref 9.95–12.87)
RBC # BLD: 3.56 M/UL — LOW (ref 4.7–6.1)
RBC # BLD: 4.18 M/UL — LOW (ref 4.7–6.1)
RBC # BLD: 4.81 M/UL — SIGNIFICANT CHANGE UP (ref 4.7–6.1)
RBC # FLD: 13.2 % — SIGNIFICANT CHANGE UP (ref 11.5–14.5)
RBC # FLD: 13.2 % — SIGNIFICANT CHANGE UP (ref 11.5–14.5)
RBC # FLD: 13.3 % — SIGNIFICANT CHANGE UP (ref 11.5–14.5)
RBC BLD AUTO: NORMAL — SIGNIFICANT CHANGE UP
SODIUM SERPL-SCNC: 137 MMOL/L — SIGNIFICANT CHANGE UP (ref 135–146)
SODIUM SERPL-SCNC: 139 MMOL/L — SIGNIFICANT CHANGE UP (ref 135–146)
SODIUM SERPL-SCNC: 139 MMOL/L — SIGNIFICANT CHANGE UP (ref 135–146)
WBC # BLD: 10.49 K/UL — SIGNIFICANT CHANGE UP (ref 4.8–10.8)
WBC # BLD: 20.9 K/UL — HIGH (ref 4.8–10.8)
WBC # BLD: 9.97 K/UL — SIGNIFICANT CHANGE UP (ref 4.8–10.8)
WBC # FLD AUTO: 10.49 K/UL — SIGNIFICANT CHANGE UP (ref 4.8–10.8)
WBC # FLD AUTO: 20.9 K/UL — HIGH (ref 4.8–10.8)
WBC # FLD AUTO: 9.97 K/UL — SIGNIFICANT CHANGE UP (ref 4.8–10.8)

## 2021-03-25 PROCEDURE — 33533 CABG ARTERIAL SINGLE: CPT

## 2021-03-25 PROCEDURE — ZZZZZ: CPT

## 2021-03-25 PROCEDURE — 71045 X-RAY EXAM CHEST 1 VIEW: CPT | Mod: 26

## 2021-03-25 PROCEDURE — 93010 ELECTROCARDIOGRAM REPORT: CPT | Mod: 77

## 2021-03-25 PROCEDURE — 93010 ELECTROCARDIOGRAM REPORT: CPT

## 2021-03-25 RX ORDER — ASPIRIN/CALCIUM CARB/MAGNESIUM 324 MG
300 TABLET ORAL ONCE
Refills: 0 | Status: DISCONTINUED | OUTPATIENT
Start: 2021-03-25 | End: 2021-03-26

## 2021-03-25 RX ORDER — IPRATROPIUM BROMIDE 0.2 MG/ML
2 SOLUTION, NON-ORAL INHALATION EVERY 6 HOURS
Refills: 0 | Status: DISCONTINUED | OUTPATIENT
Start: 2021-03-25 | End: 2021-03-26

## 2021-03-25 RX ORDER — DEXMEDETOMIDINE HYDROCHLORIDE IN 0.9% SODIUM CHLORIDE 4 UG/ML
0.25 INJECTION INTRAVENOUS
Qty: 200 | Refills: 0 | Status: DISCONTINUED | OUTPATIENT
Start: 2021-03-25 | End: 2021-03-26

## 2021-03-25 RX ORDER — DEXTROSE 50 % IN WATER 50 %
50 SYRINGE (ML) INTRAVENOUS
Refills: 0 | Status: DISCONTINUED | OUTPATIENT
Start: 2021-03-25 | End: 2021-03-28

## 2021-03-25 RX ORDER — NICARDIPINE HYDROCHLORIDE 30 MG/1
5 CAPSULE, EXTENDED RELEASE ORAL
Qty: 40 | Refills: 0 | Status: DISCONTINUED | OUTPATIENT
Start: 2021-03-25 | End: 2021-03-26

## 2021-03-25 RX ORDER — DEXTROSE 50 % IN WATER 50 %
25 SYRINGE (ML) INTRAVENOUS
Refills: 0 | Status: DISCONTINUED | OUTPATIENT
Start: 2021-03-25 | End: 2021-03-28

## 2021-03-25 RX ORDER — INSULIN HUMAN 100 [IU]/ML
1 INJECTION, SOLUTION SUBCUTANEOUS
Qty: 100 | Refills: 0 | Status: DISCONTINUED | OUTPATIENT
Start: 2021-03-25 | End: 2021-03-26

## 2021-03-25 RX ORDER — KETOROLAC TROMETHAMINE 30 MG/ML
30 SYRINGE (ML) INJECTION ONCE
Refills: 0 | Status: DISCONTINUED | OUTPATIENT
Start: 2021-03-25 | End: 2021-03-25

## 2021-03-25 RX ORDER — MAGNESIUM SULFATE 500 MG/ML
1 VIAL (ML) INJECTION EVERY 12 HOURS
Refills: 0 | Status: DISCONTINUED | OUTPATIENT
Start: 2021-03-25 | End: 2021-03-28

## 2021-03-25 RX ORDER — HYDROMORPHONE HYDROCHLORIDE 2 MG/ML
0.5 INJECTION INTRAMUSCULAR; INTRAVENOUS; SUBCUTANEOUS ONCE
Refills: 0 | Status: DISCONTINUED | OUTPATIENT
Start: 2021-03-25 | End: 2021-03-25

## 2021-03-25 RX ORDER — CHLORHEXIDINE GLUCONATE 213 G/1000ML
15 SOLUTION TOPICAL EVERY 12 HOURS
Refills: 0 | Status: DISCONTINUED | OUTPATIENT
Start: 2021-03-25 | End: 2021-03-26

## 2021-03-25 RX ORDER — ALBUMIN HUMAN 25 %
500 VIAL (ML) INTRAVENOUS ONCE
Refills: 0 | Status: COMPLETED | OUTPATIENT
Start: 2021-03-25 | End: 2021-03-25

## 2021-03-25 RX ORDER — METOPROLOL TARTRATE 50 MG
1 TABLET ORAL
Qty: 0 | Refills: 0 | DISCHARGE

## 2021-03-25 RX ORDER — CEFAZOLIN SODIUM 1 G
2000 VIAL (EA) INJECTION EVERY 8 HOURS
Refills: 0 | Status: COMPLETED | OUTPATIENT
Start: 2021-03-25 | End: 2021-03-26

## 2021-03-25 RX ORDER — FENTANYL CITRATE 50 UG/ML
25 INJECTION INTRAVENOUS ONCE
Refills: 0 | Status: DISCONTINUED | OUTPATIENT
Start: 2021-03-25 | End: 2021-03-25

## 2021-03-25 RX ORDER — POLYETHYLENE GLYCOL 3350 17 G/17G
17 POWDER, FOR SOLUTION ORAL DAILY
Refills: 0 | Status: DISCONTINUED | OUTPATIENT
Start: 2021-03-25 | End: 2021-03-28

## 2021-03-25 RX ORDER — ALBUTEROL 90 UG/1
2 AEROSOL, METERED ORAL EVERY 6 HOURS
Refills: 0 | Status: DISCONTINUED | OUTPATIENT
Start: 2021-03-25 | End: 2021-03-26

## 2021-03-25 RX ORDER — NOREPINEPHRINE BITARTRATE/D5W 8 MG/250ML
0.05 PLASTIC BAG, INJECTION (ML) INTRAVENOUS
Qty: 8 | Refills: 0 | Status: DISCONTINUED | OUTPATIENT
Start: 2021-03-25 | End: 2021-03-26

## 2021-03-25 RX ORDER — ALBUMIN HUMAN 25 %
3000 VIAL (ML) INTRAVENOUS ONCE
Refills: 0 | Status: DISCONTINUED | OUTPATIENT
Start: 2021-03-25 | End: 2021-03-26

## 2021-03-25 RX ORDER — SODIUM CHLORIDE 9 MG/ML
1000 INJECTION INTRAMUSCULAR; INTRAVENOUS; SUBCUTANEOUS
Refills: 0 | Status: DISCONTINUED | OUTPATIENT
Start: 2021-03-25 | End: 2021-03-26

## 2021-03-25 RX ORDER — PROPOFOL 10 MG/ML
15 INJECTION, EMULSION INTRAVENOUS
Qty: 1000 | Refills: 0 | Status: DISCONTINUED | OUTPATIENT
Start: 2021-03-25 | End: 2021-03-26

## 2021-03-25 RX ORDER — ACETAMINOPHEN 500 MG
1000 TABLET ORAL ONCE
Refills: 0 | Status: COMPLETED | OUTPATIENT
Start: 2021-03-25 | End: 2021-03-25

## 2021-03-25 RX ORDER — OXYCODONE HYDROCHLORIDE 5 MG/1
5 TABLET ORAL EVERY 4 HOURS
Refills: 0 | Status: DISCONTINUED | OUTPATIENT
Start: 2021-03-25 | End: 2021-03-28

## 2021-03-25 RX ORDER — FENTANYL CITRATE 50 UG/ML
50 INJECTION INTRAVENOUS ONCE
Refills: 0 | Status: DISCONTINUED | OUTPATIENT
Start: 2021-03-25 | End: 2021-03-25

## 2021-03-25 RX ORDER — NITROGLYCERIN 6.5 MG
5 CAPSULE, EXTENDED RELEASE ORAL
Qty: 50 | Refills: 0 | Status: DISCONTINUED | OUTPATIENT
Start: 2021-03-25 | End: 2021-03-26

## 2021-03-25 RX ORDER — MEPERIDINE HYDROCHLORIDE 50 MG/ML
25 INJECTION INTRAMUSCULAR; INTRAVENOUS; SUBCUTANEOUS ONCE
Refills: 0 | Status: DISCONTINUED | OUTPATIENT
Start: 2021-03-25 | End: 2021-03-28

## 2021-03-25 RX ORDER — CHLORHEXIDINE GLUCONATE 213 G/1000ML
5 SOLUTION TOPICAL
Refills: 0 | Status: DISCONTINUED | OUTPATIENT
Start: 2021-03-25 | End: 2021-03-26

## 2021-03-25 RX ORDER — ONDANSETRON 8 MG/1
4 TABLET, FILM COATED ORAL ONCE
Refills: 0 | Status: DISCONTINUED | OUTPATIENT
Start: 2021-03-25 | End: 2021-03-28

## 2021-03-25 RX ORDER — ACETAMINOPHEN 500 MG
650 TABLET ORAL EVERY 6 HOURS
Refills: 0 | Status: DISCONTINUED | OUTPATIENT
Start: 2021-03-26 | End: 2021-03-28

## 2021-03-25 RX ORDER — FAMOTIDINE 10 MG/ML
20 INJECTION INTRAVENOUS EVERY 12 HOURS
Refills: 0 | Status: DISCONTINUED | OUTPATIENT
Start: 2021-03-25 | End: 2021-03-26

## 2021-03-25 RX ORDER — OXYCODONE HYDROCHLORIDE 5 MG/1
10 TABLET ORAL EVERY 4 HOURS
Refills: 0 | Status: DISCONTINUED | OUTPATIENT
Start: 2021-03-25 | End: 2021-03-28

## 2021-03-25 RX ORDER — CHLORHEXIDINE GLUCONATE 213 G/1000ML
1 SOLUTION TOPICAL
Refills: 0 | Status: DISCONTINUED | OUTPATIENT
Start: 2021-03-25 | End: 2021-03-28

## 2021-03-25 RX ORDER — ASPIRIN/CALCIUM CARB/MAGNESIUM 324 MG
325 TABLET ORAL DAILY
Refills: 0 | Status: DISCONTINUED | OUTPATIENT
Start: 2021-03-26 | End: 2021-03-28

## 2021-03-25 RX ADMIN — Medication 30 MILLIGRAM(S): at 17:45

## 2021-03-25 RX ADMIN — Medication 400 MILLIGRAM(S): at 20:00

## 2021-03-25 RX ADMIN — OXYCODONE HYDROCHLORIDE 10 MILLIGRAM(S): 5 TABLET ORAL at 21:00

## 2021-03-25 RX ADMIN — INSULIN HUMAN 1 UNIT(S)/HR: 100 INJECTION, SOLUTION SUBCUTANEOUS at 20:56

## 2021-03-25 RX ADMIN — CHLORHEXIDINE GLUCONATE 5 MILLILITER(S): 213 SOLUTION TOPICAL at 17:41

## 2021-03-25 RX ADMIN — OXYCODONE HYDROCHLORIDE 10 MILLIGRAM(S): 5 TABLET ORAL at 22:25

## 2021-03-25 RX ADMIN — FENTANYL CITRATE 25 MICROGRAM(S): 50 INJECTION INTRAVENOUS at 17:45

## 2021-03-25 RX ADMIN — FAMOTIDINE 20 MILLIGRAM(S): 10 INJECTION INTRAVENOUS at 17:44

## 2021-03-25 RX ADMIN — FENTANYL CITRATE 50 MICROGRAM(S): 50 INJECTION INTRAVENOUS at 23:58

## 2021-03-25 RX ADMIN — FENTANYL CITRATE 50 MICROGRAM(S): 50 INJECTION INTRAVENOUS at 22:42

## 2021-03-25 RX ADMIN — Medication 1000 MILLIGRAM(S): at 20:57

## 2021-03-25 RX ADMIN — Medication 100 GRAM(S): at 17:43

## 2021-03-25 RX ADMIN — FENTANYL CITRATE 25 MICROGRAM(S): 50 INJECTION INTRAVENOUS at 17:15

## 2021-03-25 RX ADMIN — Medication 250 MILLILITER(S): at 21:30

## 2021-03-25 RX ADMIN — Medication 10 MICROGRAM(S)/KG/MIN: at 20:56

## 2021-03-25 RX ADMIN — CHLORHEXIDINE GLUCONATE 15 MILLILITER(S): 213 SOLUTION TOPICAL at 17:41

## 2021-03-25 RX ADMIN — FENTANYL CITRATE 25 MICROGRAM(S): 50 INJECTION INTRAVENOUS at 17:00

## 2021-03-25 RX ADMIN — FENTANYL CITRATE 25 MICROGRAM(S): 50 INJECTION INTRAVENOUS at 17:30

## 2021-03-25 RX ADMIN — HYDROMORPHONE HYDROCHLORIDE 0.5 MILLIGRAM(S): 2 INJECTION INTRAMUSCULAR; INTRAVENOUS; SUBCUTANEOUS at 18:30

## 2021-03-25 RX ADMIN — HYDROMORPHONE HYDROCHLORIDE 0.5 MILLIGRAM(S): 2 INJECTION INTRAMUSCULAR; INTRAVENOUS; SUBCUTANEOUS at 18:45

## 2021-03-25 RX ADMIN — Medication 30 MILLIGRAM(S): at 18:00

## 2021-03-25 RX ADMIN — Medication 250 MILLILITER(S): at 16:00

## 2021-03-25 RX ADMIN — SODIUM CHLORIDE 20 MILLILITER(S): 9 INJECTION INTRAMUSCULAR; INTRAVENOUS; SUBCUTANEOUS at 20:56

## 2021-03-25 RX ADMIN — Medication 100 MILLIGRAM(S): at 17:43

## 2021-03-25 RX ADMIN — Medication 250 MILLILITER(S): at 17:00

## 2021-03-25 NOTE — DISCHARGE NOTE PROVIDER - PROVIDER TOKENS
PROVIDER:[TOKEN:[[id number]],FOLLOWUP:[1 month]],PROVIDER:[TOKEN:[[id number]],FOLLOWUP:[1 month]],PROVIDER:[TOKEN:[[id number]],FOLLOWUP:[1 week]]

## 2021-03-25 NOTE — DISCHARGE NOTE PROVIDER - HOSPITAL COURSE
60y Male recently presented to hospital with SOB and high calcium score on CT for elective cath. Found to have  of LAD. He was discharged with plan for elective opening of LAD in 1 week. Day after cath presented to ED with SOB and dizziness which was felt to be do to HR < 50 2/2 BB. He was d/c after 24 with no BB and plan for LAD opening in 1 week. Today he presented for scheduled PCI of  of LAD. During procedure LAD was perforated with accumulating blood in pericardium appreciated. He was emergently seen by CTS and taken to OR for evacuation of pericardial effusion and bypass to LAD. His post op course was   60y Male recently presented to hospital with SOB and high calcium score on CT for elective cath. Found to have  of LAD. He was discharged with plan for elective opening of LAD in 1 week. Day after cath presented to ED with SOB and dizziness which was felt to be do to HR < 50 2/2 BB. He was d/c after 24 hrs with no BB and plan for LAD opening in 1 week. 3/25 he presented for scheduled PCI of  of LAD. During procedure LAD was dissected with accumulating blood in pericardium appreciated. He was emergently seen by CTS and taken to OR for evacuation of pericardial effusion and bypass to LAD. His post op course was uneventful and he was discharged home in stable condition, with room air sat's of 90-91%. He will call to make an appointment on Monday for f/u with Dr. Ag on Wednesday.

## 2021-03-25 NOTE — DISCHARGE NOTE PROVIDER - NSDCCPCAREPLAN_GEN_ALL_CORE_FT
PRINCIPAL DISCHARGE DIAGNOSIS  Diagnosis: Coronary artery disease of native heart with stable angina pectoris  Assessment and Plan of Treatment:       SECONDARY DISCHARGE DIAGNOSES  Diagnosis: Acute pericardial effusion  Assessment and Plan of Treatment:

## 2021-03-25 NOTE — BRIEF OPERATIVE NOTE - NSICDXBRIEFPOSTOP_GEN_ALL_CORE_FT
POST-OP DIAGNOSIS:  Dissection of coronary artery as complication of coronary intervention procedure 25-Mar-2021 15:18:00  Omega Ag

## 2021-03-25 NOTE — DISCHARGE NOTE PROVIDER - NSDCMRMEDTOKEN_GEN_ALL_CORE_FT
aspirin 325 mg oral tablet: 1 tab(s) orally once a day  atorvastatin 40 mg oral tablet: 1 tab(s) orally once a day  ramipril 2.5 mg oral capsule: 1 cap(s) orally once a day   aspirin 325 mg oral tablet: 1 tab(s) orally once a day  atorvastatin 40 mg oral tablet: 1 tab(s) orally once a day  clopidogrel 75 mg oral tablet: 1 tab(s) orally once a day  famotidine 20 mg oral tablet: 1 tab(s) orally 2 times a day  furosemide 20 mg oral tablet: 1 tab(s) orally once a day   guaiFENesin 600 mg oral tablet, extended release: 1 tab(s) orally every 12 hours  K-Tab 10 mEq oral tablet, extended release: 1 tab(s) orally once a day   metoprolol tartrate 25 mg oral tablet: 0.5 tab(s) orally once a day   oxyCODONE 5 mg oral tablet: 1 tab(s) orally every 4 hours, As needed, Moderate Pain (4 - 6) MDD:six  polyethylene glycol 3350 oral powder for reconstitution: 17 gram(s) orally once a day  senna oral tablet: 2 tab(s) orally once a day (at bedtime)

## 2021-03-25 NOTE — DISCHARGE NOTE PROVIDER - CARE PROVIDER_API CALL
Aj Olmedo  Cardiovascular Disease  [address] [facility] [address]  [place] [address]  Phone: [phone]  Fax: [phone]  Follow Up Time: 1 month    Bong Zepeda  INTERNAL MEDICINE  [address] [place] [address]  Phone: [phone]  Fax: [phone]  Follow Up Time: 1 month    Omega Ag)  Surgery; Thoracic and Cardiac Surgery  [address] [facility], [address]  [place] [address]  Phone: [phone]  Fax: [phone]  Follow Up Time: 1 week

## 2021-03-25 NOTE — DISCHARGE NOTE PROVIDER - CARE PROVIDERS DIRECT ADDRESSES
,DirectAddress_Unknown,DirectAddress_Unknown,[email or web address]

## 2021-03-25 NOTE — DISCHARGE NOTE PROVIDER - NSDCFUADDAPPT_GEN_ALL_CORE_FT
Follow up with Dr. Ag on April  Please call office, [phone] for Follow up with Dr. Ag on March 31.

## 2021-03-25 NOTE — BRIEF OPERATIVE NOTE - OPERATION/FINDINGS
ANTERIOR WALL CONTAINED PERFORATION FOR SMALL EPICARDIAL HEMATOMA; DIFFUSE RECANALIZED DISSECTED LAD REQUIRED LONG PATCH ANASTOMOSIS WITH LIMA

## 2021-03-25 NOTE — CHART NOTE - NSCHARTNOTEFT_GEN_A_CORE
PRE-OP DIAGNOSIS: CAD,  of LAD    PROCEDURE: Bilateral coronary angiography, attempted  of LAD, IVUS    Physician: Dr. Catarina Irizarry  Assistant: Boone Garcia DO, MD    ANESTHESIA TYPE:  [  ]General Anesthesia  [ X ] Sedation  [ X ] Local/Regional    CONDITION  [ X ] Critical  [  ] Serious  [  ]Fair  [  ]Good      IV CONTRAST:    150         mL      FINDINGS   of mid LAD after major diagonal branch with left to left and right to left collaterals    Patient on the table had wire induced perforation of his coronary artery. Balloon was inflated in the vessel, bedside echo was done until stat echo was performed. CT surgery was emergently contacted and evaluated the patient (Dr. Scott and Dr. Ag) on the table. Patient was given Protamine sulfate and wide open IV fluids. Patient was given multiple pushes of norsynephrine, levophed and started on a dopamine drip. Joint decision was made to intubate the patient and bring the patient emergently to the OR for LIMA to LAD and pericardial window. Patient left the lab in critical condition.    POST-OP DIAGNOSIS  CAD, Coronary perforation        PLAN OF CARE  Emergent transfer to OR for LIMA to LAD and pericardial window PRE-OP DIAGNOSIS: CAD,  of LAD    PROCEDURE: Bilateral coronary angiography, attempted  of LAD, IVUS    Physician: Dr. Catarina Irizarry  Assistant: Boone Garcia DO, MD    ANESTHESIA TYPE:  [  ]General Anesthesia  [ X ] Sedation  [ X ] Local/Regional    CONDITION  [ X ] Critical  [  ] Serious  [  ]Fair  [  ]Good      IV CONTRAST:    150         mL      FINDINGS  Unsuccessful attempt  of mid LAD, complicated by wire induced perforation and moderate to large PE, with mild RA and RV inservion on echo. Balloon was inflated in the vessel to occlude the flow and reduce PE expansion. CT surgery was emergently contacted and evaluated the patient (Dr. Scott and Dr. Ag) on the table. Patient was given Protamine sulfate to reverse a/c.  Joint decision was made to intubate the patient and bring the patient emergently to the OR for LIMA to LAD and pericardial fluid drainage. Patient left the lab in stable, but serious condition.    POST-OP DIAGNOSIS  CAD, Unsuccessful PCI of LAD, complicated by Coronary perforation        PLAN OF CARE  Urgent transfer to OR for LIMA to LAD and pericardial window

## 2021-03-25 NOTE — H&P CARDIOLOGY - HISTORY OF PRESENT ILLNESS
60M w/ PMHx of HTN, CAD(multivessel on CTA), LINDA, and Gastric Bypass. Pt here for staged PCI/ of mLAD had diagnostic cath 3/17/21, pt reports SOB with minimal exertion and chest burning over past few months .   FINDINGS    Left Heart Catheterization  LVEDP: 16 mm Hg    LV Gram  Estimated EF: 60%    Coronary Angiography  Left dominant system                                 Left main: angiographically normal    LAD:     proximal: luminal irregularities  mid: 100% occlusion right after major diagonal branch with an early take of  distal: fills via right to left and left to left collaterals                     Diag 1: medium caliber vessel, angiographically normal supplies left to left collaterals to LAD  Diag 2: small caliber which fills via left to left collaterals    Left Circumflex: luminal irregularities in proximal portion and focal 40% stenosis at bifurcation of Om1, Distal LCx is angiographically normal  OM1: medium caliber with 60% ostial stenosis  LPDA: angiographically normal    Right Coronary Artery: small non dominant with mild luminal irregularities    Ramus Intermedius: medium caliber, with luminal irregularities in superior branch    POST-OP DIAGNOSIS  CAD        PLAN OF CARE  [ X ] D/C Home today  Plan for  of mid LAD in the near future.        Vital Signs Last 24 Hrs  T(C): --  T(F): --  HR: --61  BP: --113/67  BP(mean): --83  RR: --  SpO2: --98% RA    Pre cath note:  indication:  [ ] STEMI                [ ] NSTEMI                 [ ] Acute coronary syndrome                   [ ]Unstable Angina   [ ] high risk  [ ] intermediate risk  [ ] low risk                   [x ] Stable Angina     non-invasive testing:                          Date:   3/4/21                  result: [ ] high risk  [x ] intermediate risk  [ ] low risk    Anti- Anginal medications:                    [x ] not used                       [ ] used                   [ ] not used but strong indication not to use    Ejection Fraction                   [ ] <29            [ ] 30-39%   [ ] 40-49%     [x ]>50%    CHF                   [ ] active (within last 14 days on meds   [ ] Chronic (on meds but no exacerbation)    COPD                   [ ] mild (on chronic bronchodilators)  [ ] moderate (on chronic steroid therapy)      [ ] severe (indication for home O2 or PACO2 >50)    Other risk factors:                     [ ] Previous MI                     [ ] CVA/ stroke                    [ ] carotid stent/ CEA                    [ ] PVD/PAD- (arterial aneurysm, non-palpable pulses, tortuous vessel with inability to insert catheter, infra-renal dissection, renal or subclavian artery stenosis)                    [ ] diabetic                    [ ] previous CABG                    [ ] Renal Failure       bleeding risk: 1.1%        REVIEW OF SYSTEMS:  CONSTITUTIONAL: No fever, weight loss, or fatigue  CARDIOLOGY: PAtient denies chest pain, shortness of breath or syncopal episodes.   RESPIRATORY: denies shortness of breath, wheezing  NEUROLOGICAL: NO weakness, no focal deficits to report.  ENDOCRINOLOGICAL: no recent change in diabetic medications.   GI: no BRBPR, no N,V,diarrhea.     PHYSICAL EXAM:  · CONSTITUTIONAL:	Well-developed, well nourished    BMI-  ·RESPIRATORY:   airway patent; breath sounds equal; good air movement; respirations non-labored; clear to auscultation bilaterally; no chest wall tenderness; no intercostal retractions; no rales,rhonchi or wheeze  · CARDIOVASCULAR	regular rate and rhythm  no rub  no murmur  normal PMI  · EXTREMITIES: No cyanosis, clubbing or edema  · VASCULAR: 	Equal and normal pulses (carotid, femoral, dorsalis pedis)  	        EKG: SR

## 2021-03-25 NOTE — CONSULT NOTE ADULT - SUBJECTIVE AND OBJECTIVE BOX
Surgeon: /Manuel/ Kimberli    Consult requesting by: disha padilla    HISTORY OF PRESENT ILLNESS:  60y Male presented to hospital due to dizziness and SOB with high calcium score on CT for elective cath. Found to have  of LAD. He was discharged with plan for elective opening of LAD in 1 week. Day after cath presented to ED with SOB and dizziness which was felt to be do to HR > 50 2/2 BB. He was d/c after 24 with no BB and plan for LAD opening in 1 week. Today he presented for scheduled PCI of  of LAD. During procedure LAD was perforated with accumulating blood in pericardium appreciated. He was emergently seen by CTS and taken to OR for evacuation of pericardial effusion and bypass to LAD.      Home Medications:  aspirin 325 mg oral tablet: 1 tab(s) orally once a day (25 Mar 2021 07:15)  atorvastatin 40 mg oral tablet: 1 tab(s) orally once a day (25 Mar 2021 07:15)  ramipril 2.5 mg oral capsule: 1 cap(s) orally once a day (25 Mar 2021 07:15)      NYHA functional class    [ ] Class I (no limitation) [ ] Class II (slight limitation) [ ] Class III (marked limitation) [ ] Class IV (symptoms at rest)    PAST MEDICAL & SURGICAL HISTORY:  CAD (coronary artery disease)    Hearing loss of left ear, unspecified hearing loss type    H/O seasonal allergies    Obesity  s/p gastric sleeve ~ 2013    HTN (hypertension)    LINDA (obstructive sleep apnea)    History of hip replacement, total, left    History of appendectomy    H/O repair of right rotator cuff    H/O bariatric surgery  s/p gastric sleeve        MEDICATIONS  (STANDING):  albumin human  5% IVPB 3000 milliLiter(s) IV Intermittent once    MEDICATIONS  (PRN):    Antiplatelet therapy:       asa                    Last dose/amt:    Allergies    No Known Allergies    Intolerances        SOCIAL HISTORY:  current smoker with no alcohol or drug abuse  Occupation:  Lives with:   Assisted device use: none  5 meter walk test: 1____sec, 2____sec, 3___sec -  went to OR intubated  FAMILY HISTORY:  FH: type 2 diabetes (Mother)  Mother        Review of Systems per H/P  CONSTITUTIONAL: No fever, weight loss, or fatigue  CARDIOLOGY: PAtient denies chest pain, shortness of breath or syncopal episodes.   RESPIRATORY: denies shortness of breath, wheezing  NEUROLOGICAL: NO weakness, no focal deficits to report.  ENDOCRINOLOGICAL: no recent change in diabetic medications.   GI: no BRBPR, no N,V,diarrhea.     PHYSICAL EXAM: per h/p   · CONSTITUTIONAL:	Well-developed, well nourished    BMI-  ·RESPIRATORY:   airway patent; breath sounds equal; good air movement; respirations non-labored; clear to auscultation bilaterally; no chest wall tenderness; no intercostal retractions; no rales,rhonchi or wheeze  · CARDIOVASCULAR	regular rate and rhythm  no rub  no murmur  normal PMI  · EXTREMITIES: No cyanosis, clubbing or edema  · VASCULAR: 	Equal and normal pulses (carotid, femoral, dorsalis pedis)                                                                                                                                                                                              PHYSICAL EXAM  Vital Signs Last 24 Hrs  HR: --61  BP: --113/67  BP(mean): --83  RR: --  SpO2: --98% RA                                                        LABS:                        12.8   10.49 )-----------( 193      ( 25 Mar 2021 09:50 )             37.1     03-25    137  |  103  |  13  ----------------------------<  108<H>  4.9   |  23  |  0.8    Ca    9.3      25 Mar 2021 06:51      COVID-19: NotDetec (03-18-21 @ 17:06)    LV Gram  Estimated EF: 60%    Coronary Angiography  Left dominant system                                 Left main: angiographically normal    LAD:     proximal: luminal irregularities  mid: 100% occlusion right after major diagonal branch with an early take of  distal: fills via right to left and left to left collaterals                     Diag 1: medium caliber vessel, angiographically normal supplies left to left collaterals to LAD  Diag 2: small caliber which fills via left to left collaterals    Left Circumflex: luminal irregularities in proximal portion and focal 40% stenosis at bifurcation of Om1, Distal LCx is angiographically normal  OM1: medium caliber with 60% ostial stenosis  LPDA: angiographically normal    Right Coronary Artery: small non dominant with mild luminal irregularities    Ramus Intermedius: medium caliber, with luminal irregularities in superior branch    < from: TTE Echo Complete w/o Contrast w/ Doppler (03.19.21 @ 11:11) >   1. Left ventricular ejection fraction, by visual estimation, is 55%.   2. Left atrial enlargement.   3. Mild mitral valve regurgitation.   4. Mild-moderate tricuspid regurgitation.   5. Mitral valve mean gradient is 1.2 mmHg consistent with normal mitral stenosis.   6. Normal right atrial size.    < end of copied text >        < from: 12 Lead ECG (03.18.21 @ 13:55) >  Ventricular Rate 51 BPM    Atrial Rate 51 BPM    P-R Interval 170 ms    QRS Duration 84 ms    Q-T Interval 432 ms    QTC Calculation(Bazett) 398 ms    P Axis 46 degrees    R Axis 21 degrees    T Axis 28 degrees    Diagnosis Line Sinus bradycardia  Otherwise normal ECG    < end of copied text >      61 yo male with LAD perforation and pericardial effusion in need of emergent bypass of LAD and evacuation of pericardial effusion.   Patient taken to OR immediately.   Attending note to follow

## 2021-03-25 NOTE — BRIEF OPERATIVE NOTE - NSICDXBRIEFPREOP_GEN_ALL_CORE_FT
PRE-OP DIAGNOSIS:  Dissection of coronary artery as complication of coronary intervention procedure 25-Mar-2021 15:17:51  Omega Ag

## 2021-03-26 LAB
ALBUMIN SERPL ELPH-MCNC: 4.2 G/DL — SIGNIFICANT CHANGE UP (ref 3.5–5.2)
ALP SERPL-CCNC: 31 U/L — SIGNIFICANT CHANGE UP (ref 30–115)
ALT FLD-CCNC: 22 U/L — SIGNIFICANT CHANGE UP (ref 0–41)
ANION GAP SERPL CALC-SCNC: 9 MMOL/L — SIGNIFICANT CHANGE UP (ref 7–14)
APTT BLD: 28.5 SEC — SIGNIFICANT CHANGE UP (ref 27–39.2)
AST SERPL-CCNC: 23 U/L — SIGNIFICANT CHANGE UP (ref 0–41)
BASE EXCESS BLDMV CALC-SCNC: 1 MMOL/L — SIGNIFICANT CHANGE UP
BASOPHILS # BLD AUTO: 0.03 K/UL — SIGNIFICANT CHANGE UP (ref 0–0.2)
BASOPHILS NFR BLD AUTO: 0.2 % — SIGNIFICANT CHANGE UP (ref 0–1)
BILIRUB SERPL-MCNC: 0.9 MG/DL — SIGNIFICANT CHANGE UP (ref 0.2–1.2)
BUN SERPL-MCNC: 10 MG/DL — SIGNIFICANT CHANGE UP (ref 10–20)
CALCIUM SERPL-MCNC: 7.5 MG/DL — LOW (ref 8.5–10.1)
CHLORIDE SERPL-SCNC: 107 MMOL/L — SIGNIFICANT CHANGE UP (ref 98–110)
CO2 SERPL-SCNC: 22 MMOL/L — SIGNIFICANT CHANGE UP (ref 17–32)
CREAT SERPL-MCNC: 0.8 MG/DL — SIGNIFICANT CHANGE UP (ref 0.7–1.5)
EOSINOPHIL # BLD AUTO: 0.04 K/UL — SIGNIFICANT CHANGE UP (ref 0–0.7)
EOSINOPHIL NFR BLD AUTO: 0.3 % — SIGNIFICANT CHANGE UP (ref 0–8)
GAS PNL BLDA: SIGNIFICANT CHANGE UP
GAS PNL BLDA: SIGNIFICANT CHANGE UP
GAS PNL BLDMV: SIGNIFICANT CHANGE UP
GLUCOSE BLDC GLUCOMTR-MCNC: 117 MG/DL — HIGH (ref 70–99)
GLUCOSE BLDC GLUCOMTR-MCNC: 123 MG/DL — HIGH (ref 70–99)
GLUCOSE BLDC GLUCOMTR-MCNC: 142 MG/DL — HIGH (ref 70–99)
GLUCOSE BLDC GLUCOMTR-MCNC: 158 MG/DL — HIGH (ref 70–99)
GLUCOSE SERPL-MCNC: 129 MG/DL — HIGH (ref 70–99)
HCO3 BLDMV-SCNC: 27 MMOL/L — SIGNIFICANT CHANGE UP
HCT VFR BLD CALC: 27.3 % — LOW (ref 42–52)
HGB BLD-MCNC: 9.2 G/DL — LOW (ref 14–18)
HOROWITZ INDEX BLDMV+IHG-RTO: 44 — SIGNIFICANT CHANGE UP
IMM GRANULOCYTES NFR BLD AUTO: 0.5 % — HIGH (ref 0.1–0.3)
INR BLD: 1.38 RATIO — HIGH (ref 0.65–1.3)
LYMPHOCYTES # BLD AUTO: 1.33 K/UL — SIGNIFICANT CHANGE UP (ref 1.2–3.4)
LYMPHOCYTES # BLD AUTO: 10.2 % — LOW (ref 20.5–51.1)
MAGNESIUM SERPL-MCNC: 1.9 MG/DL — SIGNIFICANT CHANGE UP (ref 1.8–2.4)
MCHC RBC-ENTMCNC: 30.3 PG — SIGNIFICANT CHANGE UP (ref 27–31)
MCHC RBC-ENTMCNC: 33.7 G/DL — SIGNIFICANT CHANGE UP (ref 32–37)
MCV RBC AUTO: 89.8 FL — SIGNIFICANT CHANGE UP (ref 80–94)
MONOCYTES # BLD AUTO: 0.78 K/UL — HIGH (ref 0.1–0.6)
MONOCYTES NFR BLD AUTO: 6 % — SIGNIFICANT CHANGE UP (ref 1.7–9.3)
NEUTROPHILS # BLD AUTO: 10.83 K/UL — HIGH (ref 1.4–6.5)
NEUTROPHILS NFR BLD AUTO: 82.8 % — HIGH (ref 42.2–75.2)
NRBC # BLD: 0 /100 WBCS — SIGNIFICANT CHANGE UP (ref 0–0)
O2 CT VFR BLD CALC: 37 MMHG — SIGNIFICANT CHANGE UP (ref 35–40)
PCO2 BLDMV: 49 MMHG — SIGNIFICANT CHANGE UP (ref 41–51)
PH BLDMV: 7.35 — SIGNIFICANT CHANGE UP (ref 7.33–7.44)
PLATELET # BLD AUTO: 126 K/UL — LOW (ref 130–400)
POTASSIUM SERPL-MCNC: 4.2 MMOL/L — SIGNIFICANT CHANGE UP (ref 3.5–5)
POTASSIUM SERPL-SCNC: 4.2 MMOL/L — SIGNIFICANT CHANGE UP (ref 3.5–5)
PROT SERPL-MCNC: 5.5 G/DL — LOW (ref 6–8)
PROTHROM AB SERPL-ACNC: 15.9 SEC — HIGH (ref 9.95–12.87)
RBC # BLD: 3.04 M/UL — LOW (ref 4.7–6.1)
RBC # FLD: 13.2 % — SIGNIFICANT CHANGE UP (ref 11.5–14.5)
SAO2 % BLDMV: 69 % — LOW (ref 70–75)
SODIUM SERPL-SCNC: 138 MMOL/L — SIGNIFICANT CHANGE UP (ref 135–146)
WBC # BLD: 13.07 K/UL — HIGH (ref 4.8–10.8)
WBC # FLD AUTO: 13.07 K/UL — HIGH (ref 4.8–10.8)

## 2021-03-26 PROCEDURE — 99233 SBSQ HOSP IP/OBS HIGH 50: CPT

## 2021-03-26 PROCEDURE — 71045 X-RAY EXAM CHEST 1 VIEW: CPT | Mod: 26

## 2021-03-26 PROCEDURE — 93010 ELECTROCARDIOGRAM REPORT: CPT

## 2021-03-26 PROCEDURE — 71045 X-RAY EXAM CHEST 1 VIEW: CPT | Mod: 26,77

## 2021-03-26 PROCEDURE — 92978 ENDOLUMINL IVUS OCT C 1ST: CPT | Mod: 26,LD

## 2021-03-26 PROCEDURE — 92943 PRQ TRLUML REVSC CH OCC ANT: CPT | Mod: LD,53

## 2021-03-26 RX ORDER — KETOROLAC TROMETHAMINE 30 MG/ML
30 SYRINGE (ML) INJECTION ONCE
Refills: 0 | Status: DISCONTINUED | OUTPATIENT
Start: 2021-03-26 | End: 2021-03-26

## 2021-03-26 RX ORDER — SENNA PLUS 8.6 MG/1
2 TABLET ORAL AT BEDTIME
Refills: 0 | Status: DISCONTINUED | OUTPATIENT
Start: 2021-03-26 | End: 2021-03-28

## 2021-03-26 RX ORDER — ATORVASTATIN CALCIUM 80 MG/1
40 TABLET, FILM COATED ORAL AT BEDTIME
Refills: 0 | Status: DISCONTINUED | OUTPATIENT
Start: 2021-03-26 | End: 2021-03-28

## 2021-03-26 RX ORDER — SODIUM CHLORIDE 9 MG/ML
1000 INJECTION, SOLUTION INTRAVENOUS
Refills: 0 | Status: DISCONTINUED | OUTPATIENT
Start: 2021-03-26 | End: 2021-03-28

## 2021-03-26 RX ORDER — INSULIN LISPRO 100/ML
VIAL (ML) SUBCUTANEOUS
Refills: 0 | Status: DISCONTINUED | OUTPATIENT
Start: 2021-03-26 | End: 2021-03-28

## 2021-03-26 RX ORDER — HYDROMORPHONE HYDROCHLORIDE 2 MG/ML
0.5 INJECTION INTRAMUSCULAR; INTRAVENOUS; SUBCUTANEOUS ONCE
Refills: 0 | Status: DISCONTINUED | OUTPATIENT
Start: 2021-03-26 | End: 2021-03-26

## 2021-03-26 RX ORDER — ALBUMIN HUMAN 25 %
500 VIAL (ML) INTRAVENOUS ONCE
Refills: 0 | Status: COMPLETED | OUTPATIENT
Start: 2021-03-26 | End: 2021-03-26

## 2021-03-26 RX ORDER — GLUCAGON INJECTION, SOLUTION 0.5 MG/.1ML
1 INJECTION, SOLUTION SUBCUTANEOUS ONCE
Refills: 0 | Status: DISCONTINUED | OUTPATIENT
Start: 2021-03-26 | End: 2021-03-28

## 2021-03-26 RX ORDER — DEXTROSE 50 % IN WATER 50 %
15 SYRINGE (ML) INTRAVENOUS ONCE
Refills: 0 | Status: DISCONTINUED | OUTPATIENT
Start: 2021-03-26 | End: 2021-03-28

## 2021-03-26 RX ORDER — ENOXAPARIN SODIUM 100 MG/ML
40 INJECTION SUBCUTANEOUS DAILY
Refills: 0 | Status: DISCONTINUED | OUTPATIENT
Start: 2021-03-26 | End: 2021-03-28

## 2021-03-26 RX ORDER — HYDROMORPHONE HYDROCHLORIDE 2 MG/ML
1 INJECTION INTRAMUSCULAR; INTRAVENOUS; SUBCUTANEOUS ONCE
Refills: 0 | Status: DISCONTINUED | OUTPATIENT
Start: 2021-03-26 | End: 2021-03-26

## 2021-03-26 RX ORDER — IPRATROPIUM/ALBUTEROL SULFATE 18-103MCG
3 AEROSOL WITH ADAPTER (GRAM) INHALATION EVERY 6 HOURS
Refills: 0 | Status: DISCONTINUED | OUTPATIENT
Start: 2021-03-26 | End: 2021-03-28

## 2021-03-26 RX ORDER — ACETAMINOPHEN 500 MG
1000 TABLET ORAL ONCE
Refills: 0 | Status: COMPLETED | OUTPATIENT
Start: 2021-03-26 | End: 2021-03-26

## 2021-03-26 RX ORDER — FAMOTIDINE 10 MG/ML
20 INJECTION INTRAVENOUS
Refills: 0 | Status: DISCONTINUED | OUTPATIENT
Start: 2021-03-26 | End: 2021-03-28

## 2021-03-26 RX ORDER — METOPROLOL TARTRATE 50 MG
12.5 TABLET ORAL
Refills: 0 | Status: DISCONTINUED | OUTPATIENT
Start: 2021-03-26 | End: 2021-03-28

## 2021-03-26 RX ADMIN — Medication 325 MILLIGRAM(S): at 11:59

## 2021-03-26 RX ADMIN — Medication 250 MILLILITER(S): at 12:00

## 2021-03-26 RX ADMIN — Medication 1: at 12:00

## 2021-03-26 RX ADMIN — Medication 1000 MILLIGRAM(S): at 17:15

## 2021-03-26 RX ADMIN — Medication 400 MILLIGRAM(S): at 17:00

## 2021-03-26 RX ADMIN — Medication 100 GRAM(S): at 19:09

## 2021-03-26 RX ADMIN — OXYCODONE HYDROCHLORIDE 10 MILLIGRAM(S): 5 TABLET ORAL at 19:38

## 2021-03-26 RX ADMIN — Medication 100 GRAM(S): at 03:54

## 2021-03-26 RX ADMIN — Medication 12.5 MILLIGRAM(S): at 19:09

## 2021-03-26 RX ADMIN — CHLORHEXIDINE GLUCONATE 1 APPLICATION(S): 213 SOLUTION TOPICAL at 05:54

## 2021-03-26 RX ADMIN — Medication 600 MILLIGRAM(S): at 19:09

## 2021-03-26 RX ADMIN — HYDROMORPHONE HYDROCHLORIDE 0.5 MILLIGRAM(S): 2 INJECTION INTRAMUSCULAR; INTRAVENOUS; SUBCUTANEOUS at 03:55

## 2021-03-26 RX ADMIN — SENNA PLUS 2 TABLET(S): 8.6 TABLET ORAL at 22:47

## 2021-03-26 RX ADMIN — Medication 30 MILLIGRAM(S): at 10:45

## 2021-03-26 RX ADMIN — Medication 100 MILLIGRAM(S): at 10:49

## 2021-03-26 RX ADMIN — Medication 650 MILLIGRAM(S): at 06:51

## 2021-03-26 RX ADMIN — OXYCODONE HYDROCHLORIDE 10 MILLIGRAM(S): 5 TABLET ORAL at 20:15

## 2021-03-26 RX ADMIN — POLYETHYLENE GLYCOL 3350 17 GRAM(S): 17 POWDER, FOR SOLUTION ORAL at 11:59

## 2021-03-26 RX ADMIN — Medication 5 MILLIGRAM(S): at 05:54

## 2021-03-26 RX ADMIN — Medication 400 MILLIGRAM(S): at 08:00

## 2021-03-26 RX ADMIN — FAMOTIDINE 20 MILLIGRAM(S): 10 INJECTION INTRAVENOUS at 19:09

## 2021-03-26 RX ADMIN — CHLORHEXIDINE GLUCONATE 15 MILLILITER(S): 213 SOLUTION TOPICAL at 05:54

## 2021-03-26 RX ADMIN — HYDROMORPHONE HYDROCHLORIDE 0.5 MILLIGRAM(S): 2 INJECTION INTRAMUSCULAR; INTRAVENOUS; SUBCUTANEOUS at 12:15

## 2021-03-26 RX ADMIN — CHLORHEXIDINE GLUCONATE 5 MILLILITER(S): 213 SOLUTION TOPICAL at 05:54

## 2021-03-26 RX ADMIN — Medication 650 MILLIGRAM(S): at 04:30

## 2021-03-26 RX ADMIN — HYDROMORPHONE HYDROCHLORIDE 1 MILLIGRAM(S): 2 INJECTION INTRAMUSCULAR; INTRAVENOUS; SUBCUTANEOUS at 05:55

## 2021-03-26 RX ADMIN — HYDROMORPHONE HYDROCHLORIDE 0.5 MILLIGRAM(S): 2 INJECTION INTRAMUSCULAR; INTRAVENOUS; SUBCUTANEOUS at 03:30

## 2021-03-26 RX ADMIN — OXYCODONE HYDROCHLORIDE 10 MILLIGRAM(S): 5 TABLET ORAL at 06:51

## 2021-03-26 RX ADMIN — OXYCODONE HYDROCHLORIDE 10 MILLIGRAM(S): 5 TABLET ORAL at 14:00

## 2021-03-26 RX ADMIN — Medication 30 MILLIGRAM(S): at 10:30

## 2021-03-26 RX ADMIN — OXYCODONE HYDROCHLORIDE 10 MILLIGRAM(S): 5 TABLET ORAL at 01:00

## 2021-03-26 RX ADMIN — Medication 1000 MILLIGRAM(S): at 08:15

## 2021-03-26 RX ADMIN — ENOXAPARIN SODIUM 40 MILLIGRAM(S): 100 INJECTION SUBCUTANEOUS at 11:59

## 2021-03-26 RX ADMIN — Medication 5 MILLIGRAM(S): at 19:09

## 2021-03-26 RX ADMIN — Medication 600 MILLIGRAM(S): at 05:54

## 2021-03-26 RX ADMIN — FAMOTIDINE 20 MILLIGRAM(S): 10 INJECTION INTRAVENOUS at 05:55

## 2021-03-26 RX ADMIN — OXYCODONE HYDROCHLORIDE 10 MILLIGRAM(S): 5 TABLET ORAL at 23:50

## 2021-03-26 RX ADMIN — OXYCODONE HYDROCHLORIDE 10 MILLIGRAM(S): 5 TABLET ORAL at 08:15

## 2021-03-26 RX ADMIN — HYDROMORPHONE HYDROCHLORIDE 0.5 MILLIGRAM(S): 2 INJECTION INTRAMUSCULAR; INTRAVENOUS; SUBCUTANEOUS at 12:00

## 2021-03-26 RX ADMIN — OXYCODONE HYDROCHLORIDE 10 MILLIGRAM(S): 5 TABLET ORAL at 13:30

## 2021-03-26 RX ADMIN — Medication 100 MILLIGRAM(S): at 01:04

## 2021-03-26 RX ADMIN — HYDROMORPHONE HYDROCHLORIDE 1 MILLIGRAM(S): 2 INJECTION INTRAMUSCULAR; INTRAVENOUS; SUBCUTANEOUS at 05:30

## 2021-03-26 RX ADMIN — ATORVASTATIN CALCIUM 40 MILLIGRAM(S): 80 TABLET, FILM COATED ORAL at 22:47

## 2021-03-26 RX ADMIN — OXYCODONE HYDROCHLORIDE 10 MILLIGRAM(S): 5 TABLET ORAL at 07:45

## 2021-03-26 NOTE — PROGRESS NOTE ADULT - SUBJECTIVE AND OBJECTIVE BOX
ROBER HUDSON  MRN#: [id number]  Subjective:  Patient was seen and evalauted on AM rounds offerring no specific compliants at this time.    OBJECTIVE:  ICU Vital Signs Last 24 Hrs  T(C): 36.5 (26 Mar 2021 08:00), Max: 37.4 (25 Mar 2021 20:00)  T(F): 97.7 (26 Mar 2021 08:00), Max: 99.3 (25 Mar 2021 20:00)  HR: 88 (26 Mar 2021 10:30) (56 - 90)  BP: 116/55 (26 Mar 2021 10:30) (94/54 - 136/60)  BP(mean): 77 (26 Mar 2021 10:30) (69 - 97)  ABP: 93/53 (26 Mar 2021 10:30) (77/49 - 140/80)  ABP(mean): 68 (26 Mar 2021 10:30) (59 - 124)  RR: 31 (26 Mar 2021 10:30) (6 - 188)  SpO2: 93% (26 Mar 2021 10:30) (92% - 100%)       @ 07: @ 07:00  --------------------------------------------------------  IN: 2636.7 mL / OUT: 2665 mL / NET: -28.3 mL     @ 07: @ 11:05  --------------------------------------------------------  IN: 390 mL / OUT: 375 mL / NET: 15 mL      CAPILLARY BLOOD GLUCOSE  132 (25 Mar 2021 16:00)      POCT Blood Glucose.: 123 mg/dL (26 Mar 2021 06:17)      PHYSICAL EXAM:Daily     Daily Weight in k.2 (26 Mar 2021 06:00)  General: WN/WD NAD    HEENT:     + NCAT  + EOMI  - Conjuctival edema   - Icterus   - Thrush   - ETT  - NGT/OGT    Neck:         + FROM    - JVD     - Nodes     - Masses    + Mid-line trachea   - Tracheostomy    Chest:         - Sternal click  - Sternal drainage  + Pacing wires  + Chest tubes  - SubQ emphysema    Lungs:          + CTA   - Rhonchi    - Rales    - Wheezing     - Decreased BS   - Dullness R L    Cardiac:       + S1 + S2    + RRR   - Irregular   - S3  - S4    - Murmurs   - Rub   - Hamman’s sign     Abdomen:    + BS     + Soft    + Non-tender     - Distended    - Organomegaly  - PEG    Extremities:   - Cyanosis U/L   - Clubbing  U/L  + LE Edema   + Capillary refill    + Pulses     Neuro:        + Awake   +  Alert   - Confused   - Lethargic   - Sedated   - Generalized Weakness    Skin:        - Rashes    - Erythema   + Normal incisions   + IV sites intact  - Sacral decubitus    HOSPITAL MEDICATIONS:  MEDICATIONS  (STANDING):  albumin human  5% IVPB 3000 milliLiter(s) IV Intermittent once  albuterol/ipratropium for Nebulization 3 milliLiter(s) Nebulizer every 6 hours  aspirin enteric coated 325 milliGRAM(s) Oral daily  aspirin Suppository 300 milliGRAM(s) Rectal once  atorvastatin 40 milliGRAM(s) Oral at bedtime  bisacodyl 5 milliGRAM(s) Oral every 12 hours  chlorhexidine 4% Liquid 1 Application(s) Topical <User Schedule>  dextrose 40% Gel 15 Gram(s) Oral once  dextrose 5%. 1000 milliLiter(s) (50 mL/Hr) IV Continuous <Continuous>  dextrose 5%. 1000 milliLiter(s) (100 mL/Hr) IV Continuous <Continuous>  dextrose 50% Injectable 50 milliLiter(s) IV Push every 15 minutes  dextrose 50% Injectable 25 milliLiter(s) IV Push every 15 minutes  enoxaparin Injectable 40 milliGRAM(s) SubCutaneous daily  famotidine    Tablet 20 milliGRAM(s) Oral two times a day  glucagon  Injectable 1 milliGRAM(s) IntraMuscular once  guaiFENesin  milliGRAM(s) Oral every 12 hours  insulin lispro (ADMELOG) corrective regimen sliding scale   SubCutaneous three times a day before meals  magnesium sulfate  IVPB 1 Gram(s) IV Intermittent every 12 hours  meperidine     Injectable 25 milliGRAM(s) IV Push once  metoprolol tartrate 12.5 milliGRAM(s) Oral two times a day  polyethylene glycol 3350 17 Gram(s) Oral daily    MEDICATIONS  (PRN):  acetaminophen   Tablet .. 650 milliGRAM(s) Oral every 6 hours PRN Temp greater or equal to 38C (100.4F), Mild Pain (1 - 3)  ondansetron  IVPB 4 milliGRAM(s) IV Intermittent once PRN Nausea and/or Vomiting  oxyCODONE    IR 5 milliGRAM(s) Oral every 4 hours PRN Moderate Pain (4 - 6)  oxyCODONE    IR 10 milliGRAM(s) Oral every 4 hours PRN Severe Pain (7 - 10)      LABS:                        9.2    13.07 )-----------( 126      ( 26 Mar 2021 01:20 )             27.3    -    138  |  107  |  10  ----------------------------<  129<H>  4.2   |  22  |  0.8    Ca    7.5<L>      26 Mar 2021 01:20  Mg     1.9     -    TPro  5.5<L>  /  Alb  4.2  /  TBili  0.9  /  DBili  x   /  AST  23  /  ALT  22  /  AlkPhos  31  26    PT/INR - ( 26 Mar 2021 01:20 )   PT: 15.90 sec;   INR: 1.38 ratio         PTT - ( 26 Mar 2021 01:20 )  PTT:28.5 sec LIVER FUNCTIONS - ( 26 Mar 2021 01:20 )  Alb: 4.2 g/dL / Pro: 5.5 g/dL / ALK PHOS: 31 U/L / ALT: 22 U/L / AST: 23 U/L / GGT: x               RADIOLOGY:  X Reviewed and interpreted by me: bilateral interstitial opacities    CARDIOPULMONARY DYSFUNCTION  - Respiratory status required supplemental oxygen & the following of continuous pulse oximetry for support & to prevent decompensation  - Continued early mobilization as tolerated  - Addressed analgesic regimen to optimize function    PREVENTION-PROPHYLAXIS  - ASA continued for graft occlusion-thromboembolism prophylaxis  - Lipitor was also started for long term graft patency  - Lovenox initiated for VTE prophylaxis in addition to Venodyne boots  - Pepcid maintained for GI bleeding prophylaxis  - Lopressor initiated for atrial fibrillation prophylaxis  - Metabolic stability & infection prophylaxis required review and adjustment of regular Insulin sliding scale and gylcemic regimen while following serial glucose levels to help achieve & maintain euglycemia  - Reviewed & addressed surgical site infection prophylaxis regimen

## 2021-03-26 NOTE — PHYSICAL THERAPY INITIAL EVALUATION ADULT - GENERAL OBSERVATIONS, REHAB EVAL
10:52-11:50 58 min  pt rec in bed in NAD RN at the b/s, removing SWAN, B CTs and all lines intact, as per RN, pt ok to get OOB, pt agreeable to PT, HR 69, /79m SPO2 95% on 4L, pt c/o 7/10 chest pain, RN aware

## 2021-03-26 NOTE — PROGRESS NOTE ADULT - ATTENDING COMMENTS
pt seen and examined  unsuccessful pci of lad, complicated by wire perf and PE  s/p urgent cabg and pe drain  no events post op   extubated  hemodyn stable  ctu mx  will follow

## 2021-03-26 NOTE — ANESTHESIA FOLLOW-UP NOTE - NSEVALATIONFT_GEN_ALL_CORE
POD1 s/p emergent CABG x1 after coronary catheterization c/b wire perforation. No anesthetic complications at this time

## 2021-03-26 NOTE — PROGRESS NOTE ADULT - SUBJECTIVE AND OBJECTIVE BOX
Cardiology Follow up    ROBER HUDSON   60y Male  PAST MEDICAL & SURGICAL HISTORY:  CAD (coronary artery disease)    Hearing loss of left ear, unspecified hearing loss type    H/O seasonal allergies    Obesity  s/p gastric sleeve ~ 2013    HTN (hypertension)    LINDA (obstructive sleep apnea)    History of hip replacement, total, left    History of appendectomy    H/O repair of right rotator cuff    H/O bariatric surgery  s/p gastric sleeve         HPI:  60M w/ PMHx of HTN, CAD(multivessel on CTA), LINDA, and Gastric Bypass. Pt here for staged PCI/ of mLAD had diagnostic cath 3/17/21, pt reports SOB with minimal exertion and chest burning over past few months .   FINDINGS    Left Heart Catheterization  LVEDP: 16 mm Hg    LV Gram  Estimated EF: 60%    Coronary Angiography  Left dominant system                                 Left main: angiographically normal    LAD:     proximal: luminal irregularities  mid: 100% occlusion right after major diagonal branch with an early take of  distal: fills via right to left and left to left collaterals                     Diag 1: medium caliber vessel, angiographically normal supplies left to left collaterals to LAD  Diag 2: small caliber which fills via left to left collaterals    Left Circumflex: luminal irregularities in proximal portion and focal 40% stenosis at bifurcation of Om1, Distal LCx is angiographically normal  OM1: medium caliber with 60% ostial stenosis  LPDA: angiographically normal    Right Coronary Artery: small non dominant with mild luminal irregularities    Ramus Intermedius: medium caliber, with luminal irregularities in superior branch    POST-OP DIAGNOSIS  CAD        PLAN OF CARE  [ X ] D/C Home today  Plan for  of mid LAD in the near future.        Vital Signs Last 24 Hrs  T(C): --  T(F): --  HR: --61  BP: --113/67  BP(mean): --83  RR: --  SpO2: --98% RA    Pre cath note:  indication:  [ ] STEMI                [ ] NSTEMI                 [ ] Acute coronary syndrome                   [ ]Unstable Angina   [ ] high risk  [ ] intermediate risk  [ ] low risk                   [x ] Stable Angina     non-invasive testing:                          Date:   3/4/21                  result: [ ] high risk  [x ] intermediate risk  [ ] low risk    Anti- Anginal medications:                    [x ] not used                       [ ] used                   [ ] not used but strong indication not to use    Ejection Fraction                   [ ] <29            [ ] 30-39%   [ ] 40-49%     [x ]>50%    CHF                   [ ] active (within last 14 days on meds   [ ] Chronic (on meds but no exacerbation)    COPD                   [ ] mild (on chronic bronchodilators)  [ ] moderate (on chronic steroid therapy)      [ ] severe (indication for home O2 or PACO2 >50)    Other risk factors:                     [ ] Previous MI                     [ ] CVA/ stroke                    [ ] carotid stent/ CEA                    [ ] PVD/PAD- (arterial aneurysm, non-palpable pulses, tortuous vessel with inability to insert catheter, infra-renal dissection, renal or subclavian artery stenosis)                    [ ] diabetic                    [ ] previous CABG                    [ ] Renal Failure       bleeding risk: 1.1%        EKG: SR (25 Mar 2021 07:06)    Allergies    No Known Allergies    Intolerances      Patient found sitting in chair with  complaint of sternal incision pain.   Denies  SOB, palpitations, or dizziness  No events on telemetry overnight    Vital Signs Last 24 Hrs  T(C): 36.5 (26 Mar 2021 08:00), Max: 37.4 (25 Mar 2021 20:00)  T(F): 97.7 (26 Mar 2021 08:00), Max: 99.3 (25 Mar 2021 20:00)  HR: 71 (26 Mar 2021 13:00) (57 - 91)  BP: 95/54 (26 Mar 2021 13:00) (95/54 - 136/60)  BP(mean): 70 (26 Mar 2021 13:00) (69 - 97)  RR: 33 (26 Mar 2021 13:00) (6 - 188)  SpO2: 97% (26 Mar 2021 13:00) (91% - 100%)    MEDICATIONS  (STANDING):  albumin human  5% IVPB 500 milliLiter(s) IV Intermittent once  albuterol/ipratropium for Nebulization 3 milliLiter(s) Nebulizer every 6 hours  aspirin enteric coated 325 milliGRAM(s) Oral daily  atorvastatin 40 milliGRAM(s) Oral at bedtime  bisacodyl 5 milliGRAM(s) Oral every 12 hours  chlorhexidine 4% Liquid 1 Application(s) Topical <User Schedule>  dextrose 40% Gel 15 Gram(s) Oral once  dextrose 5%. 1000 milliLiter(s) (50 mL/Hr) IV Continuous <Continuous>  dextrose 5%. 1000 milliLiter(s) (100 mL/Hr) IV Continuous <Continuous>  dextrose 50% Injectable 50 milliLiter(s) IV Push every 15 minutes  dextrose 50% Injectable 25 milliLiter(s) IV Push every 15 minutes  enoxaparin Injectable 40 milliGRAM(s) SubCutaneous daily  famotidine    Tablet 20 milliGRAM(s) Oral two times a day  glucagon  Injectable 1 milliGRAM(s) IntraMuscular once  guaiFENesin  milliGRAM(s) Oral every 12 hours  insulin lispro (ADMELOG) corrective regimen sliding scale   SubCutaneous three times a day before meals  magnesium sulfate  IVPB 1 Gram(s) IV Intermittent every 12 hours  meperidine     Injectable 25 milliGRAM(s) IV Push once  metoprolol tartrate 12.5 milliGRAM(s) Oral two times a day  polyethylene glycol 3350 17 Gram(s) Oral daily  senna 2 Tablet(s) Oral at bedtime    MEDICATIONS  (PRN):  acetaminophen   Tablet .. 650 milliGRAM(s) Oral every 6 hours PRN Temp greater or equal to 38C (100.4F), Mild Pain (1 - 3)  ondansetron  IVPB 4 milliGRAM(s) IV Intermittent once PRN Nausea and/or Vomiting  oxyCODONE    IR 5 milliGRAM(s) Oral every 4 hours PRN Moderate Pain (4 - 6)  oxyCODONE    IR 10 milliGRAM(s) Oral every 4 hours PRN Severe Pain (7 - 10)      PHYSICAL EXAM:           CONSTITUTIONAL: Well-developed; well-nourished; in no acute distress  	SKIN: warm, dry  	HEAD: Normocephalic; atraumatic  	ENT: No nasal discharge, airway clear, mucous membranes moist  	NECK: Supple; non tender.  	CARD: +S1, +S2, no murmurs, gallops, or rubs. Regular rate and rhythm, midsternal incision with dressing dry and intact.    	RESP: No wheezes, rales or rhonchi. breath sounds diminished  B/L  	ABD: soft ntnd, + BS x 4 quadrants  	EXT: moves all extremities,  no clubbing, cyanosis or edema  	NEURO: Alert and oriented x3, no focal deficits          PSYCH: Cooperative, appropriate           EXTREMITY:             Right Radial: Pressure dressing removed, access site soft, no hematoma, no pain, + pulses/same as baseline, no sign of infection, no numbness            ECG:  < from: 12 Lead ECG (03.26.21 @ 08:09) >  Ventricular Rate 76 BPM    Atrial Rate 76 BPM    P-R Interval 174 ms    QRS Duration 90 ms    Q-T Interval 414 ms    QTC Calculation(Bazett) 465 ms    P Axis 57 degrees    R Axis 8 degrees    T Axis 4 degrees    Diagnosis Line Normal sinus rhythm  Normal ECG    Confirmed by KG ENGLE MD ([id number]) on 3/26/2021 9:28:05 AM    < end of copied text >                                                                                                                   2D ECHO:  `    LABS:                        9.2    13.07 )-----------( 126      ( 26 Mar 2021 01:20 )             27.3     03-26    138  |  107  |  10  ----------------------------<  129<H>  4.2   |  22  |  0.8    Ca    7.5<L>      26 Mar 2021 01:20  Mg     1.9     03-26    TPro  5.5<L>  /  Alb  4.2  /  TBili  0.9  /  DBili  x   /  AST  23  /  ALT  22  /  AlkPhos  31  03-26        Magnesium, Serum: 1.9 mg/dL [1.8 - 2.4] (03-26-21 @ 01:20)  LIVER FUNCTIONS - ( 26 Mar 2021 01:20 )  Alb: 4.2 g/dL / Pro: 5.5 g/dL / ALK PHOS: 31 U/L / ALT: 22 U/L / AST: 23 U/L / GGT: x             A/P:  I discussed the case with Cardiologist Dr. Lemons  and recommend the following:    S/P Attempted PCI  POST-OP DIAGNOSIS  CAD, Unsuccessful PCI of LAD, complicated by Coronary perforation                   POST CABG Care as per CTU team.                   Monitor Access site                   GI/DVT prophyllaxis                   Maintain MG=2, K=4                   OOB to chair   	     Continue  ( Aspirin 81 mg PO Daily ),  B-Blocker, Statin Therapy, reevaluate for plavix prior to d/c                   post cath instructions, access site care and activity restrictions reviewed with patient                     Discussed with patient to notify staff if experience chest pain, shortness breath, dizziness and site bleeding                   Aggressive risk factor modification, diet counseling, smoking cessation discussed with patient                    Benefits of Cardiac Rehabilitation discussed and patient instructed to follow up with  cardiologist during outpatient visits                      Can discharge patient from cardiac standpoint after ambulating without symptoms and access site wnl and ECG reviewed                    Follow up with Cardiology Dr. Lemons                                       Cardiology Follow up    ROBER HUDSON   60y Male  PAST MEDICAL & SURGICAL HISTORY:  CAD (coronary artery disease)    Hearing loss of left ear, unspecified hearing loss type    H/O seasonal allergies    Obesity  s/p gastric sleeve ~ 2013    HTN (hypertension)    LINDA (obstructive sleep apnea)    History of hip replacement, total, left    History of appendectomy    H/O repair of right rotator cuff    H/O bariatric surgery  s/p gastric sleeve       HPI:  60M w/ PMHx of HTN, CAD(multivessel on CTA), LINDA, and Gastric Bypass. Pt here for staged PCI/ of mLAD had diagnostic cath 3/17/21, pt reports SOB with minimal exertion and chest burning over past few months .   FINDINGS    Allergies    No Known Allergies      Patient found sitting in chair with  complaint of sternal incision pain.   Denies  SOB, palpitations, or dizziness  No events on telemetry overnight, CM NSR with PAC'S     Vital Signs Last 24 Hrs  T(C): 36.5 (26 Mar 2021 08:00), Max: 37.4 (25 Mar 2021 20:00)  T(F): 97.7 (26 Mar 2021 08:00), Max: 99.3 (25 Mar 2021 20:00)  HR: 71 (26 Mar 2021 13:00) (57 - 91)  BP: 95/54 (26 Mar 2021 13:00) (95/54 - 136/60)  BP(mean): 70 (26 Mar 2021 13:00) (69 - 97)  RR: 33 (26 Mar 2021 13:00) (6 - 188)  SpO2: 97% (26 Mar 2021 13:00) (91% - 100%)    MEDICATIONS  (STANDING):  albumin human  5% IVPB 500 milliLiter(s) IV Intermittent once  albuterol/ipratropium for Nebulization 3 milliLiter(s) Nebulizer every 6 hours  aspirin enteric coated 325 milliGRAM(s) Oral daily  atorvastatin 40 milliGRAM(s) Oral at bedtime  bisacodyl 5 milliGRAM(s) Oral every 12 hours  chlorhexidine 4% Liquid 1 Application(s) Topical <User Schedule>  dextrose 40% Gel 15 Gram(s) Oral once  dextrose 5%. 1000 milliLiter(s) (50 mL/Hr) IV Continuous <Continuous>  dextrose 5%. 1000 milliLiter(s) (100 mL/Hr) IV Continuous <Continuous>  dextrose 50% Injectable 50 milliLiter(s) IV Push every 15 minutes  dextrose 50% Injectable 25 milliLiter(s) IV Push every 15 minutes  enoxaparin Injectable 40 milliGRAM(s) SubCutaneous daily  famotidine    Tablet 20 milliGRAM(s) Oral two times a day  glucagon  Injectable 1 milliGRAM(s) IntraMuscular once  guaiFENesin  milliGRAM(s) Oral every 12 hours  insulin lispro (ADMELOG) corrective regimen sliding scale   SubCutaneous three times a day before meals  magnesium sulfate  IVPB 1 Gram(s) IV Intermittent every 12 hours  meperidine     Injectable 25 milliGRAM(s) IV Push once  metoprolol tartrate 12.5 milliGRAM(s) Oral two times a day  polyethylene glycol 3350 17 Gram(s) Oral daily  senna 2 Tablet(s) Oral at bedtime    MEDICATIONS  (PRN):  acetaminophen   Tablet .. 650 milliGRAM(s) Oral every 6 hours PRN Temp greater or equal to 38C (100.4F), Mild Pain (1 - 3)  ondansetron  IVPB 4 milliGRAM(s) IV Intermittent once PRN Nausea and/or Vomiting  oxyCODONE    IR 5 milliGRAM(s) Oral every 4 hours PRN Moderate Pain (4 - 6)  oxyCODONE    IR 10 milliGRAM(s) Oral every 4 hours PRN Severe Pain (7 - 10)      PHYSICAL EXAM:           CONSTITUTIONAL: Well-developed; well-nourished; in no acute distress  	SKIN: warm, dry  	HEAD: Normocephalic; atraumatic  	ENT: No nasal discharge, airway clear, mucous membranes moist  	NECK: Supple; non tender.  	CARD: +S1, +S2, no murmurs, gallops, or rubs. Regular rate and rhythm, midsternal incision with dressing dry and intact.    	RESP: No wheezes, rales or rhonchi. breath sounds diminished  B/L, bilateral chest, n/c at 3liters, po2 95%     	ABD: soft ntnd, + BS x 4 quadrants              URO: Balbuena to SBD with angelito urine  	EXT: moves all extremities,  no clubbing, cyanosis or edema  	NEURO: Alert and oriented x3, no focal deficits          PSYCH: Cooperative, appropriate           EXTREMITY:             Right Radial: Pressure dressing removed, access site soft, no hematoma, + pulses, no sign of infection, no numbness            ECG:  < from: 12 Lead ECG (03.26.21 @ 08:09) >  Ventricular Rate 76 BPM    Atrial Rate 76 BPM    P-R Interval 174 ms    QRS Duration 90 ms    Q-T Interval 414 ms    QTC Calculation(Bazett) 465 ms    P Axis 57 degrees    R Axis 8 degrees    T Axis 4 degrees    Diagnosis Line Normal sinus rhythm  Normal ECG    Confirmed by KG ENGLE MD ([id number]) on 3/26/2021 9:28:05 AM    < end of copied text >   EXAM:  XR CHEST PORTABLE ROUTINE 1V            PROCEDURE DATE:  03/26/2021      INTERPRETATION:  Clinical History / Reason for exam: Postoperative x-ray.    Comparison : Chest radiograph March 25, 2021.    Technique/Positioning: Single frontal chest x-ray obtained.    Findings:    Support devices: Interval extubation and removal of enteric tube. Stable bilateral chest tubes, right [place]-July catheter.    Cardiac/mediastinum/hilum: Unchanged.    Lung parenchyma/Pleura: Stable bilateral interstitial opacities. No radiographic evidence of pneumothorax.    Skeleton/soft tissues: Unchanged.    Impression:    Interval extubation and removal of enteric tube. Stable additional support devices.  Stable bilateral interstitial opacities.    CECILY CLEMENTS MD; Attending Radiologist  This document has been electronically signed. Mar 26 2021  8:59AM                                                                                                                  2D ECHO:  `    LABS:                        9.2    13.07 )-----------( 126      ( 26 Mar 2021 01:20 )             27.3     03-26    138  |  107  |  10  ----------------------------<  129<H>  4.2   |  22  |  0.8    Ca    7.5<L>      26 Mar 2021 01:20  Mg     1.9     03-26    TPro  5.5<L>  /  Alb  4.2  /  TBili  0.9  /  DBili  x   /  AST  23  /  ALT  22  /  AlkPhos  31  03-26        Magnesium, Serum: 1.9 mg/dL [1.8 - 2.4] (03-26-21 @ 01:20)  LIVER FUNCTIONS - ( 26 Mar 2021 01:20 )  Alb: 4.2 g/dL / Pro: 5.5 g/dL / ALK PHOS: 31 U/L / ALT: 22 U/L / AST: 23 U/L / GGT: x             A/P:  I discussed the case with Cardiologist Dr. Lemons  and recommend the following:    S/P Attempted PCI  POST-OP DIAGNOSIS  CAD, Unsuccessful PCI of LAD, complicated by Coronary perforation                   POST CABG Care as per CTU team.                   Monitor Access site                   GI/DVT prophyllaxis                   Maintain MG=2, K=4                   OOB to chair   	     Continue  Aspirin  PO Daily,  B-Blocker, Statin Therapy,                   post cath instructions, access site care and activity restrictions reviewed with patient                     Discussed with patient to notify staff if experience chest pain, shortness breath, dizziness and site bleeding                   Aggressive risk factor modification, diet counseling, smoking cessation discussed with patient                    Follow up with Cardiology Dr. Lemons                                       Cardiology Follow up    ROBER HUDSON   60y Male  PAST MEDICAL & SURGICAL HISTORY:  CAD (coronary artery disease)    Hearing loss of left ear, unspecified hearing loss type    H/O seasonal allergies    Obesity  s/p gastric sleeve ~ 2013    HTN (hypertension)    LINDA (obstructive sleep apnea)    History of hip replacement, total, left    History of appendectomy    H/O repair of right rotator cuff    H/O bariatric surgery  s/p gastric sleeve       HPI:  60M w/ PMHx of HTN, CAD(multivessel on CTA), LINDA, and Gastric Bypass. Pt here for planned PCI/ of mLAD had diagnostic cath 3/17/21, pt reports SOB with minimal exertion and chest burning over past few months .   FINDINGS    Allergies    No Known Allergies      Patient found sitting in chair with  complaint of sternal incision pain.   Denies  SOB, palpitations, or dizziness  No events on telemetry overnight, CM NSR with PAC'S     Vital Signs Last 24 Hrs  T(C): 36.5 (26 Mar 2021 08:00), Max: 37.4 (25 Mar 2021 20:00)  T(F): 97.7 (26 Mar 2021 08:00), Max: 99.3 (25 Mar 2021 20:00)  HR: 71 (26 Mar 2021 13:00) (57 - 91)  BP: 95/54 (26 Mar 2021 13:00) (95/54 - 136/60)  BP(mean): 70 (26 Mar 2021 13:00) (69 - 97)  RR: 33 (26 Mar 2021 13:00) (6 - 188)  SpO2: 97% (26 Mar 2021 13:00) (91% - 100%)    MEDICATIONS  (STANDING):  albumin human  5% IVPB 500 milliLiter(s) IV Intermittent once  albuterol/ipratropium for Nebulization 3 milliLiter(s) Nebulizer every 6 hours  aspirin enteric coated 325 milliGRAM(s) Oral daily  atorvastatin 40 milliGRAM(s) Oral at bedtime  bisacodyl 5 milliGRAM(s) Oral every 12 hours  chlorhexidine 4% Liquid 1 Application(s) Topical <User Schedule>  dextrose 40% Gel 15 Gram(s) Oral once  dextrose 5%. 1000 milliLiter(s) (50 mL/Hr) IV Continuous <Continuous>  dextrose 5%. 1000 milliLiter(s) (100 mL/Hr) IV Continuous <Continuous>  dextrose 50% Injectable 50 milliLiter(s) IV Push every 15 minutes  dextrose 50% Injectable 25 milliLiter(s) IV Push every 15 minutes  enoxaparin Injectable 40 milliGRAM(s) SubCutaneous daily  famotidine    Tablet 20 milliGRAM(s) Oral two times a day  glucagon  Injectable 1 milliGRAM(s) IntraMuscular once  guaiFENesin  milliGRAM(s) Oral every 12 hours  insulin lispro (ADMELOG) corrective regimen sliding scale   SubCutaneous three times a day before meals  magnesium sulfate  IVPB 1 Gram(s) IV Intermittent every 12 hours  meperidine     Injectable 25 milliGRAM(s) IV Push once  metoprolol tartrate 12.5 milliGRAM(s) Oral two times a day  polyethylene glycol 3350 17 Gram(s) Oral daily  senna 2 Tablet(s) Oral at bedtime    MEDICATIONS  (PRN):  acetaminophen   Tablet .. 650 milliGRAM(s) Oral every 6 hours PRN Temp greater or equal to 38C (100.4F), Mild Pain (1 - 3)  ondansetron  IVPB 4 milliGRAM(s) IV Intermittent once PRN Nausea and/or Vomiting  oxyCODONE    IR 5 milliGRAM(s) Oral every 4 hours PRN Moderate Pain (4 - 6)  oxyCODONE    IR 10 milliGRAM(s) Oral every 4 hours PRN Severe Pain (7 - 10)      PHYSICAL EXAM:           CONSTITUTIONAL: Well-developed; well-nourished; in no acute distress  	SKIN: warm, dry  	HEAD: Normocephalic; atraumatic  	ENT: No nasal discharge, airway clear, mucous membranes moist  	NECK: Supple; non tender.  	CARD: +S1, +S2, no murmurs, gallops, or rubs. Regular rate and rhythm, midsternal incision with dressing dry and intact.    	RESP: No wheezes, rales or rhonchi. breath sounds diminished  B/L, bilateral chest, n/c at 3liters, po2 95%     	ABD: soft ntnd, + BS x 4 quadrants              URO: Balbuena to SBD with angelito urine  	EXT: moves all extremities,  no clubbing, cyanosis or edema  	NEURO: Alert and oriented x3, no focal deficits          PSYCH: Cooperative, appropriate           EXTREMITY:             Right Radial: Pressure dressing removed, access site soft, no hematoma, + pulses, no sign of infection, no numbness            ECG:  < from: 12 Lead ECG (03.26.21 @ 08:09) >  Ventricular Rate 76 BPM    Atrial Rate 76 BPM    P-R Interval 174 ms    QRS Duration 90 ms    Q-T Interval 414 ms    QTC Calculation(Bazett) 465 ms    P Axis 57 degrees    R Axis 8 degrees    T Axis 4 degrees    Diagnosis Line Normal sinus rhythm  Normal ECG    Confirmed by KG ENGLE MD ([id number]) on 3/26/2021 9:28:05 AM    < end of copied text >   EXAM:  XR CHEST PORTABLE ROUTINE 1V            PROCEDURE DATE:  03/26/2021      INTERPRETATION:  Clinical History / Reason for exam: Postoperative x-ray.    Comparison : Chest radiograph March 25, 2021.    Technique/Positioning: Single frontal chest x-ray obtained.    Findings:    Support devices: Interval extubation and removal of enteric tube. Stable bilateral chest tubes, right [place]-July catheter.    Cardiac/mediastinum/hilum: Unchanged.    Lung parenchyma/Pleura: Stable bilateral interstitial opacities. No radiographic evidence of pneumothorax.    Skeleton/soft tissues: Unchanged.    Impression:    Interval extubation and removal of enteric tube. Stable additional support devices.  Stable bilateral interstitial opacities.    CECILY CLEMENTS MD; Attending Radiologist  This document has been electronically signed. Mar 26 2021  8:59AM                                                                                                                  2D ECHO:  `    LABS:                        9.2    13.07 )-----------( 126      ( 26 Mar 2021 01:20 )             27.3     03-26    138  |  107  |  10  ----------------------------<  129<H>  4.2   |  22  |  0.8    Ca    7.5<L>      26 Mar 2021 01:20  Mg     1.9     03-26    TPro  5.5<L>  /  Alb  4.2  /  TBili  0.9  /  DBili  x   /  AST  23  /  ALT  22  /  AlkPhos  31  03-26        Magnesium, Serum: 1.9 mg/dL [1.8 - 2.4] (03-26-21 @ 01:20)  LIVER FUNCTIONS - ( 26 Mar 2021 01:20 )  Alb: 4.2 g/dL / Pro: 5.5 g/dL / ALK PHOS: 31 U/L / ALT: 22 U/L / AST: 23 U/L / GGT: x             A/P:  I discussed the case with Cardiologist Dr. Lemons  and recommend the following:    S/P Attempted PCI  POST-OP DIAGNOSIS  CAD, Unsuccessful PCI of LAD, complicated by Coronary perforation                   POST CABG Care as per CTU team.                   Monitor Access site                   GI/DVT prophyllaxis                   Maintain MG=2, K=4                   OOB to chair   	     Continue  Aspirin  PO Daily,  B-Blocker, Statin Therapy,                   post cath instructions, access site care and activity restrictions reviewed with patient                     Discussed with patient to notify staff if experience chest pain, shortness breath, dizziness and site bleeding                   Aggressive risk factor modification, diet counseling, smoking cessation discussed with patient                    Follow up with Cardiology as outpt

## 2021-03-26 NOTE — PHYSICAL THERAPY INITIAL EVALUATION ADULT - PERTINENT HX OF CURRENT PROBLEM, REHAB EVAL
pt adm for PCI of LAD, was unsuccessful, complicated by coronary perforation, CABG x 1 done (LIMA to LAD)

## 2021-03-26 NOTE — PROGRESS NOTE ADULT - ASSESSMENT
Assessment/Plan:  CAD-s/p CABG x 1-POD #1  1-BP control-start beta-blockers  2-serum glucose control-insulin infusion  3-fluid overload-gentle diuresis  4-acute blood loss anemia-stable, monitor Hb/Hct daily  1-[id number], monitor plts daily

## 2021-03-26 NOTE — PROGRESS NOTE ADULT - SUBJECTIVE AND OBJECTIVE BOX
OPERATIVE PROCEDURE(s):                POD #                       SURGEON(s): LATOYA Ag  SUBJECTIVE ASSESSMENT:60yMale patient seen and examined at bedside.    Vital Signs Last 24 Hrs  T(F): 99.1 (26 Mar 2021 00:00), Max: 99.3 (25 Mar 2021 20:00)  HR: 81 (26 Mar 2021 06:30) (56 - 90)  BP: 111/58 (26 Mar 2021 06:15) (94/54 - 136/60)  BP(mean): 81 (26 Mar 2021 06:15) (69 - 97)  ABP: 134/119 (26 Mar 2021 06:30) (77/49 - 140/80)  ABP(mean): 124 (26 Mar 2021 06:30)  RR: 19 (26 Mar 2021 06:30) (6 - 188)  SpO2: 98% (26 Mar 2021 06:30) (92% - 100%)  CVP(mm Hg): 7 (26 Mar 2021 06:30)  CVP(cm H2O): --  CO: 9.6 (26 Mar 2021 06:00)  CI: 4.1 (26 Mar 2021 06:00)  PA: 34/11 (26 Mar 2021 06:30)  SVR: 699 (26 Mar 2021 06:00)  Mode: CPAP with PS  FiO2: 40  PEEP: 5  PS: 8    I&O's Detail    25 Mar 2021 07:01  -  26 Mar 2021 07:00  --------------------------------------------------------  IN:    Albumin 5%  - 500 mL: 1500 mL    Dexmedetomidine: 52.4 mL    IV PiggyBack: 450 mL    Nitroglycerin: 5 mL    Norepinephrine: 109.3 mL    Oral Fluid: 180 mL    sodium chloride 0.9%: 340 mL  Total IN: 2636.7 mL    OUT:    Chest Tube (mL): 830 mL    Chest Tube (mL): 270 mL    Chest Tube (mL): 165 mL    Indwelling Catheter - Urethral (mL): 1400 mL    Insulin: 0 mL    NiCARdipine: 0 mL    Propofol: 0 mL  Total OUT: 2665 mL        Net: I&O's Detail    25 Mar 2021 07:01  -  26 Mar 2021 07:00  --------------------------------------------------------  Total NET: -28.3 mL        CAPILLARY BLOOD GLUCOSE  132 (25 Mar 2021 16:00)      POCT Blood Glucose.: 123 mg/dL (26 Mar 2021 06:17)  POCT Blood Glucose.: 120 mg/dL (25 Mar 2021 23:45)  POCT Blood Glucose.: 134 mg/dL (25 Mar 2021 20:34)  POCT Blood Glucose.: 83 mg/dL (25 Mar 2021 18:32)  POCT Blood Glucose.: 132 mg/dL (25 Mar 2021 15:42)      Physical Exam:  General: NAD; A&Ox3  Cardiac: S1/S2, RRR, no murmur, no rubs  Lungs: unlabored respirations, CTA b/l, no wheeze, no rales, no crackles  Abdomen: Soft/NT/ND; positive bowel sounds x 4  Sternum: Intact, no click, incision healing well with no drainage  Incisions: Incisions clean/dry/intact  Extremities: No edema b/l lower extremities; good capillary refill; no cyanosis; palpable 1+ pedal pulses b/l    Central Venous Catheter: Yes[]  No[] , If Yes indication:                    Day #  Balbuena Catheter: Yes  [] , No  [] , If yes indication:                                 Day #  NGT: Yes [] No [] ,    If Yes Placement:                                                   Day #  EPICARDIAL WIRES:  [] YES [] NO                                                            Day #  BOWEL MOVEMENT:  [] YES [] NO, If No, Timing since last BM Day #  CHEST TUBE(Left/Right):  [] YES [] NO, If yes -  AIR LEAKS:  [] YES [] NO        LABS:                        9.2<L>  13.07<H> )-----------( 126<L>    ( 26 Mar 2021 01:20 )             27.3<L>                        10.8<L>  20.90<H> )-----------( 129<L>    ( 25 Mar 2021 15:25 )             31.9<L>    03-26    138  |  107  |  10  ----------------------------<  129<H>  4.2   |  22  |  0.8  03-25    139  |  109  |  11  ----------------------------<  123<H>  4.3   |  21  |  0.7    Ca    7.5<L>      26 Mar 2021 01:20  Mg     1.9     03-26    TPro  5.5<L> [6.0 - 8.0]  /  Alb  4.2 [3.5 - 5.2]  /  TBili  0.9 [0.2 - 1.2]  /  DBili  x   /  AST  23 [0 - 41]  /  ALT  22 [0 - 41]  /  AlkPhos  31 [30 - 115]  03-26    PT/INR - ( 26 Mar 2021 01:20 )   PT: ;   INR: 1.38 ratio         PT/INR - ( 25 Mar 2021 15:25 )   PT: ;   INR: 1.47 ratio         PTT - ( 26 Mar 2021 01:20 )  PTT:28.5 sec, PTT - ( 25 Mar 2021 15:25 )  PTT:25.4 sec    ABG - ( 26 Mar 2021 03:31 )  pH: 7.38  /  pCO2: 43    /  pO2: 119   / HCO3: 25    / Base Excess: -0.2  /  SaO2: 99    /  LA: 1.0              RADIOLOGY & ADDITIONAL TESTS:  CXR:   EKG:  Allergies    No Known Allergies    Intolerances      MEDICATIONS  (STANDING):  albumin human  5% IVPB 3000 milliLiter(s) IV Intermittent once  ALBUTerol    90 MICROgram(s) HFA Inhaler 2 Puff(s) Inhalation every 6 hours  aspirin enteric coated 325 milliGRAM(s) Oral daily  aspirin Suppository 300 milliGRAM(s) Rectal once  bisacodyl 5 milliGRAM(s) Oral every 12 hours  ceFAZolin   IVPB 2000 milliGRAM(s) IV Intermittent every 8 hours  chlorhexidine 0.12% Liquid 5 milliLiter(s) Oral Mucosa two times a day  chlorhexidine 0.12% Liquid 15 milliLiter(s) Oral Mucosa every 12 hours  chlorhexidine 4% Liquid 1 Application(s) Topical <User Schedule>  dexMEDEtomidine Infusion 0.25 MICROgram(s)/kG/Hr (6.69 mL/Hr) IV Continuous <Continuous>  dextrose 50% Injectable 50 milliLiter(s) IV Push every 15 minutes  dextrose 50% Injectable 25 milliLiter(s) IV Push every 15 minutes  famotidine Injectable 20 milliGRAM(s) IV Push every 12 hours  guaiFENesin  milliGRAM(s) Oral every 12 hours  insulin regular Infusion 1 Unit(s)/Hr (1 mL/Hr) IV Continuous <Continuous>  ipratropium 17 MICROgram(s) HFA Inhaler 2 Puff(s) Inhalation every 6 hours  magnesium sulfate  IVPB 1 Gram(s) IV Intermittent every 12 hours  meperidine     Injectable 25 milliGRAM(s) IV Push once  niCARdipine Infusion 5 mG/Hr (25 mL/Hr) IV Continuous <Continuous>  nitroglycerin  Infusion 5 MICROgram(s)/Min (1.5 mL/Hr) IV Continuous <Continuous>  norepinephrine Infusion 0.05 MICROgram(s)/kG/Min (10 mL/Hr) IV Continuous <Continuous>  polyethylene glycol 3350 17 Gram(s) Oral daily  propofol Infusion 15 MICROgram(s)/kG/Min (9.64 mL/Hr) IV Continuous <Continuous>  sodium chloride 0.9%. 1000 milliLiter(s) (20 mL/Hr) IV Continuous <Continuous>    MEDICATIONS  (PRN):  acetaminophen   Tablet .. 650 milliGRAM(s) Oral every 6 hours PRN Temp greater or equal to 38C (100.4F), Mild Pain (1 - 3)  ondansetron  IVPB 4 milliGRAM(s) IV Intermittent once PRN Nausea and/or Vomiting  oxyCODONE    IR 5 milliGRAM(s) Oral every 4 hours PRN Moderate Pain (4 - 6)  oxyCODONE    IR 10 milliGRAM(s) Oral every 4 hours PRN Severe Pain (7 - 10)      Pharmacologic DVT Prophylaxis: [] YES, []NO: Contraindication:   [] HEPARIN: Dose: XX mg  Q24H    [] LOVENOX: Dose: XX mg  Q24H                 SCD's: YES b/l    GI Prophylaxis: Protonix [], Pepcid []    Post-Op Beta-Blockers: []Yes, []No: contraindication:  Post-Op Nitrate: []Yes, []No: contraindication:  Post-Op Aspirin: []Yes,  []No: contraindication:  Post-Op Statin: []Yes, []No: contraindication:      Ambulation/Activity Status:    Assessment/Plan:  60y Male status-post  - Case and plan discussed with CTU Intensivist and CT Surgeon - Dr. Scott/Kimberli/Manuel  - Continue CTU supportive care and ongoing plan of care as per continuing CTU rounds.    - Continue DVT/GI prophylaxis  - Incentive Spirometry 10 times an hour  - Continue to advance physical activity as tolerated and continue PT/OT as directed  1. CAD: Continue ASA, statin, BB  2. HTN:   3. A. Fib:   4. COPD/Hypoxia:   5. DM/Glucose Control:     Social Service Disposition:     OPERATIVE PROCEDURE(s): Emergent CABG1 [facility]               POD #  1                     SURGEON(s): LATOYA Ag  SUBJECTIVE ASSESSMENT:60y Male patient seen and examined at bedside. Patient denies any acute complaints at this time.     Vital Signs Last 24 Hrs  T(F): 99.1 (26 Mar 2021 00:00), Max: 99.3 (25 Mar 2021 20:00)  HR: 81 (26 Mar 2021 06:30) (56 - 90)  BP: 111/58 (26 Mar 2021 06:15) (94/54 - 136/60)  BP(mean): 81 (26 Mar 2021 06:15) (69 - 97)  ABP: 134/119 (26 Mar 2021 06:30) (77/49 - 140/80)  ABP(mean): 124 (26 Mar 2021 06:30)  RR: 19 (26 Mar 2021 06:30) (6 - 188)  SpO2: 98% (26 Mar 2021 06:30) (92% - 100%) 4 L NC  CVP(mm Hg): 7 (26 Mar 2021 06:30)  CVP(cm H2O): --  CO: 9.6 (26 Mar 2021 06:00)  CI: 4.1 (26 Mar 2021 06:00)    I&O's Detail    25 Mar 2021 07:01  -  26 Mar 2021 07:00  --------------------------------------------------------  IN:    Albumin 5%  - 500 mL: 1500 mL    Dexmedetomidine: 52.4 mL    IV PiggyBack: 450 mL    Nitroglycerin: 5 mL    Norepinephrine: 109.3 mL    Oral Fluid: 180 mL    sodium chloride 0.9%: 340 mL  Total IN: 2636.7 mL    OUT:    Chest Tube (mL): 830 mL    Chest Tube (mL): 270 mL    Chest Tube (mL): 165 mL    Indwelling Catheter - Urethral (mL): 1400 mL    Insulin: 0 mL    NiCARdipine: 0 mL    Propofol: 0 mL  Total OUT: 2665 mL    Net: I&O's Detail    25 Mar 2021 07:01  -  26 Mar 2021 07:00  --------------------------------------------------------  Total NET: -28.3 mL      CAPILLARY BLOOD GLUCOSE  132 (25 Mar 2021 16:00)  POCT Blood Glucose.: 123 mg/dL (26 Mar 2021 06:17)  POCT Blood Glucose.: 120 mg/dL (25 Mar 2021 23:45)  POCT Blood Glucose.: 134 mg/dL (25 Mar 2021 20:34)  POCT Blood Glucose.: 83 mg/dL (25 Mar 2021 18:32)  POCT Blood Glucose.: 132 mg/dL (25 Mar 2021 15:42)    Hemoglobin A1C, Whole Blood: 5.8    Physical Exam:  General: NAD; A&Ox3  Cardiac: S1/S2, RRR, no murmur, no rubs  Lungs: unlabored respirations, CTA b/l, no wheeze, no rales, no crackles  Abdomen: Soft/NT/ND; positive bowel sounds x 4  Sternum: Intact, no click, incision healing well with no drainage  Incisions: Incisions clean/dry/intact  Extremities: No edema b/l lower extremities; good capillary refill; no cyanosis; palpable 1+ pedal pulses b/l    BOWEL MOVEMENT:  [x] YES [] NO, If No, Timing since last BM Day #  CHEST TUBE(Left/Right):  [x] YES [] NO, If yes -  AIR LEAKS:  [] YES [x] NO        LABS:                        9.2<L>  13.07<H> )-----------( 126<L>    ( 26 Mar 2021 01:20 )             27.3<L>                        10.8<L>  20.90<H> )-----------( 129<L>    ( 25 Mar 2021 15:25 )             31.9<L>    03-26    138  |  107  |  10  ----------------------------<  129<H>  4.2   |  22  |  0.8  03-25    139  |  109  |  11  ----------------------------<  123<H>  4.3   |  21  |  0.7    Ca    7.5<L>      26 Mar 2021 01:20  Mg     1.9     03-26    TPro  5.5<L> [6.0 - 8.0]  /  Alb  4.2 [3.5 - 5.2]  /  TBili  0.9 [0.2 - 1.2]  /  DBili  x   /  AST  23 [0 - 41]  /  ALT  22 [0 - 41]  /  AlkPhos  31 [30 - 115]  03-26    PT/INR - ( 26 Mar 2021 01:20 )   PT: ;   INR: 1.38 ratio       PT/INR - ( 25 Mar 2021 15:25 )   PT: ;   INR: 1.47 ratio       PTT - ( 26 Mar 2021 01:20 )  PTT:28.5 sec, PTT - ( 25 Mar 2021 15:25 )  PTT:25.4 sec    ABG - ( 26 Mar 2021 03:31 )  pH: 7.38  /  pCO2: 43    /  pO2: 119   / HCO3: 25    / Base Excess: -0.2  /  SaO2: 99    /  LA: 1.0          Allergies  No Known Allergies  Intolerances      MEDICATIONS  (STANDING):  albumin human  5% IVPB 3000 milliLiter(s) IV Intermittent once  ALBUTerol    90 MICROgram(s) HFA Inhaler 2 Puff(s) Inhalation every 6 hours  aspirin enteric coated 325 milliGRAM(s) Oral daily  aspirin Suppository 300 milliGRAM(s) Rectal once  bisacodyl 5 milliGRAM(s) Oral every 12 hours  ceFAZolin   IVPB 2000 milliGRAM(s) IV Intermittent every 8 hours  chlorhexidine 0.12% Liquid 5 milliLiter(s) Oral Mucosa two times a day  chlorhexidine 0.12% Liquid 15 milliLiter(s) Oral Mucosa every 12 hours  chlorhexidine 4% Liquid 1 Application(s) Topical <User Schedule>  dexMEDEtomidine Infusion 0.25 MICROgram(s)/kG/Hr (6.69 mL/Hr) IV Continuous <Continuous>  dextrose 50% Injectable 50 milliLiter(s) IV Push every 15 minutes  dextrose 50% Injectable 25 milliLiter(s) IV Push every 15 minutes  famotidine Injectable 20 milliGRAM(s) IV Push every 12 hours  guaiFENesin  milliGRAM(s) Oral every 12 hours  insulin regular Infusion 1 Unit(s)/Hr (1 mL/Hr) IV Continuous <Continuous>  ipratropium 17 MICROgram(s) HFA Inhaler 2 Puff(s) Inhalation every 6 hours  magnesium sulfate  IVPB 1 Gram(s) IV Intermittent every 12 hours  meperidine     Injectable 25 milliGRAM(s) IV Push once  niCARdipine Infusion 5 mG/Hr (25 mL/Hr) IV Continuous <Continuous>  nitroglycerin  Infusion 5 MICROgram(s)/Min (1.5 mL/Hr) IV Continuous <Continuous>  norepinephrine Infusion 0.05 MICROgram(s)/kG/Min (10 mL/Hr) IV Continuous <Continuous>  polyethylene glycol 3350 17 Gram(s) Oral daily  propofol Infusion 15 MICROgram(s)/kG/Min (9.64 mL/Hr) IV Continuous <Continuous>  sodium chloride 0.9%. 1000 milliLiter(s) (20 mL/Hr) IV Continuous <Continuous>    MEDICATIONS  (PRN):  acetaminophen   Tablet .. 650 milliGRAM(s) Oral every 6 hours PRN Temp greater or equal to 38C (100.4F), Mild Pain (1 - 3)  ondansetron  IVPB 4 milliGRAM(s) IV Intermittent once PRN Nausea and/or Vomiting  oxyCODONE    IR 5 milliGRAM(s) Oral every 4 hours PRN Moderate Pain (4 - 6)  oxyCODONE    IR 10 milliGRAM(s) Oral every 4 hours PRN Severe Pain (7 - 10)      Post-Op Beta-Blockers: [x]Yes, []No: contraindication:  Post-Op Aspirin: [x]Yes,  []No: contraindication:  Post-Op Statin: [x]Yes, []No: contraindication:      Ambulation/Activity Status: OOB/AMB    Assessment/Plan:  60y Male status-post Emergent CABG1 [facility]               POD #  1   - Case and plan discussed with CTU Intensivist and CT Surgeon - Dr. Ag  - Continue CTU supportive care and ongoing plan of care as per continuing CTU rounds.    - Continue DVT/GI prophylaxis  - Incentive Spirometry 10 times an hour  - Continue to advance physical activity as tolerated and continue PT/OT as directed  1. CAD s/p CABG: Continue ASA, statin, BB. Patient will require Plavix prior to discharge.  2. A. Fib ppx: cont Mag 1gm IV BID and BB.   3. COPD/Hypoxia: Duoneb.   4. DM/Glucose Control: non-diabetic cont riss.

## 2021-03-27 LAB
A1C WITH ESTIMATED AVERAGE GLUCOSE RESULT: 5.7 % — HIGH (ref 4–5.6)
ALBUMIN SERPL ELPH-MCNC: 4.1 G/DL — SIGNIFICANT CHANGE UP (ref 3.5–5.2)
ALP SERPL-CCNC: 35 U/L — SIGNIFICANT CHANGE UP (ref 30–115)
ALT FLD-CCNC: 19 U/L — SIGNIFICANT CHANGE UP (ref 0–41)
ANION GAP SERPL CALC-SCNC: 10 MMOL/L — SIGNIFICANT CHANGE UP (ref 7–14)
APTT BLD: 27 SEC — SIGNIFICANT CHANGE UP (ref 27–39.2)
AST SERPL-CCNC: 23 U/L — SIGNIFICANT CHANGE UP (ref 0–41)
BASOPHILS # BLD AUTO: 0.02 K/UL — SIGNIFICANT CHANGE UP (ref 0–0.2)
BASOPHILS NFR BLD AUTO: 0.2 % — SIGNIFICANT CHANGE UP (ref 0–1)
BILIRUB SERPL-MCNC: 0.7 MG/DL — SIGNIFICANT CHANGE UP (ref 0.2–1.2)
BUN SERPL-MCNC: 14 MG/DL — SIGNIFICANT CHANGE UP (ref 10–20)
CALCIUM SERPL-MCNC: 8.3 MG/DL — LOW (ref 8.5–10.1)
CHLORIDE SERPL-SCNC: 103 MMOL/L — SIGNIFICANT CHANGE UP (ref 98–110)
CO2 SERPL-SCNC: 23 MMOL/L — SIGNIFICANT CHANGE UP (ref 17–32)
COVID-19 SPIKE DOMAIN AB INTERP: POSITIVE
COVID-19 SPIKE DOMAIN ANTIBODY RESULT: >250 U/ML — HIGH
CREAT SERPL-MCNC: 0.7 MG/DL — SIGNIFICANT CHANGE UP (ref 0.7–1.5)
EOSINOPHIL # BLD AUTO: 0.09 K/UL — SIGNIFICANT CHANGE UP (ref 0–0.7)
EOSINOPHIL NFR BLD AUTO: 0.8 % — SIGNIFICANT CHANGE UP (ref 0–8)
ESTIMATED AVERAGE GLUCOSE: 117 MG/DL — HIGH (ref 68–114)
GAS PNL BLDA: SIGNIFICANT CHANGE UP
GLUCOSE BLDC GLUCOMTR-MCNC: 118 MG/DL — HIGH (ref 70–99)
GLUCOSE BLDC GLUCOMTR-MCNC: 96 MG/DL — SIGNIFICANT CHANGE UP (ref 70–99)
GLUCOSE SERPL-MCNC: 124 MG/DL — HIGH (ref 70–99)
HCT VFR BLD CALC: 25.7 % — LOW (ref 42–52)
HGB BLD-MCNC: 8.5 G/DL — LOW (ref 14–18)
IMM GRANULOCYTES NFR BLD AUTO: 0.5 % — HIGH (ref 0.1–0.3)
INR BLD: 1.44 RATIO — HIGH (ref 0.65–1.3)
LYMPHOCYTES # BLD AUTO: 1.19 K/UL — LOW (ref 1.2–3.4)
LYMPHOCYTES # BLD AUTO: 10.7 % — LOW (ref 20.5–51.1)
MAGNESIUM SERPL-MCNC: 2.4 MG/DL — SIGNIFICANT CHANGE UP (ref 1.8–2.4)
MCHC RBC-ENTMCNC: 30.1 PG — SIGNIFICANT CHANGE UP (ref 27–31)
MCHC RBC-ENTMCNC: 33.1 G/DL — SIGNIFICANT CHANGE UP (ref 32–37)
MCV RBC AUTO: 91.1 FL — SIGNIFICANT CHANGE UP (ref 80–94)
MONOCYTES # BLD AUTO: 0.63 K/UL — HIGH (ref 0.1–0.6)
MONOCYTES NFR BLD AUTO: 5.7 % — SIGNIFICANT CHANGE UP (ref 1.7–9.3)
NEUTROPHILS # BLD AUTO: 9.1 K/UL — HIGH (ref 1.4–6.5)
NEUTROPHILS NFR BLD AUTO: 82.1 % — HIGH (ref 42.2–75.2)
NRBC # BLD: 0 /100 WBCS — SIGNIFICANT CHANGE UP (ref 0–0)
PLATELET # BLD AUTO: 113 K/UL — LOW (ref 130–400)
POTASSIUM SERPL-MCNC: 3.8 MMOL/L — SIGNIFICANT CHANGE UP (ref 3.5–5)
POTASSIUM SERPL-SCNC: 3.8 MMOL/L — SIGNIFICANT CHANGE UP (ref 3.5–5)
PROT SERPL-MCNC: 5.7 G/DL — LOW (ref 6–8)
PROTHROM AB SERPL-ACNC: 16.6 SEC — HIGH (ref 9.95–12.87)
RBC # BLD: 2.82 M/UL — LOW (ref 4.7–6.1)
RBC # FLD: 13.3 % — SIGNIFICANT CHANGE UP (ref 11.5–14.5)
SARS-COV-2 IGG+IGM SERPL QL IA: >250 U/ML — HIGH
SARS-COV-2 IGG+IGM SERPL QL IA: POSITIVE
SODIUM SERPL-SCNC: 136 MMOL/L — SIGNIFICANT CHANGE UP (ref 135–146)
WBC # BLD: 11.08 K/UL — HIGH (ref 4.8–10.8)
WBC # FLD AUTO: 11.08 K/UL — HIGH (ref 4.8–10.8)

## 2021-03-27 PROCEDURE — 71045 X-RAY EXAM CHEST 1 VIEW: CPT | Mod: 26

## 2021-03-27 PROCEDURE — 99233 SBSQ HOSP IP/OBS HIGH 50: CPT

## 2021-03-27 PROCEDURE — 93010 ELECTROCARDIOGRAM REPORT: CPT

## 2021-03-27 PROCEDURE — 71045 X-RAY EXAM CHEST 1 VIEW: CPT | Mod: 26,77

## 2021-03-27 RX ORDER — POTASSIUM CHLORIDE 20 MEQ
20 PACKET (EA) ORAL ONCE
Refills: 0 | Status: COMPLETED | OUTPATIENT
Start: 2021-03-27 | End: 2021-03-27

## 2021-03-27 RX ORDER — KETOROLAC TROMETHAMINE 30 MG/ML
30 SYRINGE (ML) INJECTION ONCE
Refills: 0 | Status: DISCONTINUED | OUTPATIENT
Start: 2021-03-27 | End: 2021-03-27

## 2021-03-27 RX ORDER — ACETAMINOPHEN 500 MG
1000 TABLET ORAL ONCE
Refills: 0 | Status: DISCONTINUED | OUTPATIENT
Start: 2021-03-27 | End: 2021-03-27

## 2021-03-27 RX ORDER — CLOPIDOGREL BISULFATE 75 MG/1
75 TABLET, FILM COATED ORAL DAILY
Refills: 0 | Status: DISCONTINUED | OUTPATIENT
Start: 2021-03-28 | End: 2021-03-28

## 2021-03-27 RX ORDER — FUROSEMIDE 40 MG
20 TABLET ORAL ONCE
Refills: 0 | Status: COMPLETED | OUTPATIENT
Start: 2021-03-27 | End: 2021-03-27

## 2021-03-27 RX ORDER — HYDRALAZINE HCL 50 MG
10 TABLET ORAL ONCE
Refills: 0 | Status: COMPLETED | OUTPATIENT
Start: 2021-03-27 | End: 2021-03-27

## 2021-03-27 RX ORDER — ACETAMINOPHEN 500 MG
1000 TABLET ORAL ONCE
Refills: 0 | Status: COMPLETED | OUTPATIENT
Start: 2021-03-27 | End: 2021-03-27

## 2021-03-27 RX ADMIN — ATORVASTATIN CALCIUM 40 MILLIGRAM(S): 80 TABLET, FILM COATED ORAL at 21:44

## 2021-03-27 RX ADMIN — Medication 5 MILLIGRAM(S): at 05:41

## 2021-03-27 RX ADMIN — Medication 12.5 MILLIGRAM(S): at 17:18

## 2021-03-27 RX ADMIN — Medication 600 MILLIGRAM(S): at 05:41

## 2021-03-27 RX ADMIN — OXYCODONE HYDROCHLORIDE 10 MILLIGRAM(S): 5 TABLET ORAL at 06:30

## 2021-03-27 RX ADMIN — ENOXAPARIN SODIUM 40 MILLIGRAM(S): 100 INJECTION SUBCUTANEOUS at 12:17

## 2021-03-27 RX ADMIN — Medication 10 MILLIGRAM(S): at 08:03

## 2021-03-27 RX ADMIN — POLYETHYLENE GLYCOL 3350 17 GRAM(S): 17 POWDER, FOR SOLUTION ORAL at 12:17

## 2021-03-27 RX ADMIN — OXYCODONE HYDROCHLORIDE 5 MILLIGRAM(S): 5 TABLET ORAL at 02:04

## 2021-03-27 RX ADMIN — Medication 100 MILLIEQUIVALENT(S): at 06:12

## 2021-03-27 RX ADMIN — Medication 30 MILLIGRAM(S): at 08:30

## 2021-03-27 RX ADMIN — FAMOTIDINE 20 MILLIGRAM(S): 10 INJECTION INTRAVENOUS at 17:18

## 2021-03-27 RX ADMIN — Medication 30 MILLIGRAM(S): at 08:00

## 2021-03-27 RX ADMIN — Medication 20 MILLIGRAM(S): at 10:19

## 2021-03-27 RX ADMIN — Medication 325 MILLIGRAM(S): at 12:17

## 2021-03-27 RX ADMIN — SENNA PLUS 2 TABLET(S): 8.6 TABLET ORAL at 22:00

## 2021-03-27 RX ADMIN — Medication 100 GRAM(S): at 17:18

## 2021-03-27 RX ADMIN — FAMOTIDINE 20 MILLIGRAM(S): 10 INJECTION INTRAVENOUS at 05:40

## 2021-03-27 RX ADMIN — OXYCODONE HYDROCHLORIDE 10 MILLIGRAM(S): 5 TABLET ORAL at 06:05

## 2021-03-27 RX ADMIN — Medication 100 GRAM(S): at 06:05

## 2021-03-27 RX ADMIN — Medication 5 MILLIGRAM(S): at 17:18

## 2021-03-27 RX ADMIN — CHLORHEXIDINE GLUCONATE 1 APPLICATION(S): 213 SOLUTION TOPICAL at 06:13

## 2021-03-27 RX ADMIN — Medication 600 MILLIGRAM(S): at 17:18

## 2021-03-27 RX ADMIN — Medication 12.5 MILLIGRAM(S): at 05:40

## 2021-03-27 RX ADMIN — Medication 20 MILLIEQUIVALENT(S): at 10:21

## 2021-03-27 NOTE — PROGRESS NOTE ADULT - SUBJECTIVE AND OBJECTIVE BOX
CTU Attending Progress Daily Note     27 Mar 2021 07:54    Procedure:                                                  POD#                   Patient seen as post-op critical care follow-up    HPI:  60M w/ PMHx of HTN, CAD(multivessel on CTA), LINDA, and Gastric Bypass. Pt here for staged PCI/ of mLAD had diagnostic cath 3/17/21, pt reports SOB with minimal exertion and chest burning over past few months .   FINDINGS    Left Heart Catheterization  LVEDP: 16 mm Hg    LV Gram  Estimated EF: 60%    Coronary Angiography  Left dominant system                                 Left main: angiographically normal    LAD:     proximal: luminal irregularities  mid: 100% occlusion right after major diagonal branch with an early take of  distal: fills via right to left and left to left collaterals                     Diag 1: medium caliber vessel, angiographically normal supplies left to left collaterals to LAD  Diag 2: small caliber which fills via left to left collaterals    Left Circumflex: luminal irregularities in proximal portion and focal 40% stenosis at bifurcation of Om1, Distal LCx is angiographically normal  OM1: medium caliber with 60% ostial stenosis  LPDA: angiographically normal    Right Coronary Artery: small non dominant with mild luminal irregularities    Ramus Intermedius: medium caliber, with luminal irregularities in superior branch    POST-OP DIAGNOSIS  CAD        PLAN OF CARE  [ X ] D/C Home today  Plan for  of mid LAD in the near future.        Vital Signs Last 24 Hrs  T(C): --  T(F): --  HR: --61  BP: --113/67  BP(mean): --83  RR: --  SpO2: --98% RA    Pre cath note:  indication:  [ ] STEMI                [ ] NSTEMI                 [ ] Acute coronary syndrome                   [ ]Unstable Angina   [ ] high risk  [ ] intermediate risk  [ ] low risk                   [x ] Stable Angina     non-invasive testing:                          Date:   3/4/21                  result: [ ] high risk  [x ] intermediate risk  [ ] low risk    Anti- Anginal medications:                    [x ] not used                       [ ] used                   [ ] not used but strong indication not to use    Ejection Fraction                   [ ] <29            [ ] 30-39%   [ ] 40-49%     [x ]>50%    CHF                   [ ] active (within last 14 days on meds   [ ] Chronic (on meds but no exacerbation)    COPD                   [ ] mild (on chronic bronchodilators)  [ ] moderate (on chronic steroid therapy)      [ ] severe (indication for home O2 or PACO2 >50)    Other risk factors:                     [ ] Previous MI                     [ ] CVA/ stroke                    [ ] carotid stent/ CEA                    [ ] PVD/PAD- (arterial aneurysm, non-palpable pulses, tortuous vessel with inability to insert catheter, infra-renal dissection, renal or subclavian artery stenosis)                    [ ] diabetic                    [ ] previous CABG                    [ ] Renal Failure       bleeding risk: 1.1%        REVIEW OF SYSTEMS:  CONSTITUTIONAL: No fever, weight loss, or fatigue  CARDIOLOGY: PAtient denies chest pain, shortness of breath or syncopal episodes.   RESPIRATORY: denies shortness of breath, wheezing  NEUROLOGICAL: NO weakness, no focal deficits to report.  ENDOCRINOLOGICAL: no recent change in diabetic medications.   GI: no BRBPR, no N,V,diarrhea.     PHYSICAL EXAM:  · CONSTITUTIONAL:	Well-developed, well nourished    BMI-  ·RESPIRATORY:   airway patent; breath sounds equal; good air movement; respirations non-labored; clear to auscultation bilaterally; no chest wall tenderness; no intercostal retractions; no rales,rhonchi or wheeze  · CARDIOVASCULAR	regular rate and rhythm  no rub  no murmur  normal PMI  · EXTREMITIES: No cyanosis, clubbing or edema  · VASCULAR: 	Equal and normal pulses (carotid, femoral, dorsalis pedis)  	        EKG: SR (25 Mar 2021 07:06)    See preop testing chart H&P    Interval event for past 24 hr:  ANTHONYONIELROBER DOUGLAS  60y had no event.     Current Complains:  ROBER HUDSON has no new complaints    REVIEW OF SYSTEMS:  CONSTITUTIONAL:  [-] weakness, [-] fevers, [-] chills  EYES/ENT: [-] visual changes, [-] vertigo, [-] throat pain   NECK: [-] pain, [-] stiffness  RESPIRATORY: [-] cough, [-] wheezing, [-] hemoptysis, [-] shortness of breath  CARDIOVASCULAR: [-] chest pain, [-] palpitations, [-] orthopnea  GASTROINTESTINAL:    [-]abdominal pain, [-] nausea, [-] vomiting, [-] hematemesis, [-] diarrhea, [-] constipation, [-] melena, [-] hematochezia.  GENITOURINARY: [-] dysuria, [-] frequency, [-] hematuria  NEUROLOGICAL: [-] numbness, [-] weakness  SKIN: [-] itching, [-] burning, [-] rashes, [-] lesions   All other review of systems is negative unless indicated above.    [  ] Unable to assess ROS because :    OBJECTIVE:  ICU Vital Signs Last 24 Hrs  T(C): 37 (27 Mar 2021 04:00), Max: 37.1 (26 Mar 2021 20:00)  T(F): 98.6 (27 Mar 2021 04:00), Max: 98.8 (26 Mar 2021 20:00)  HR: 88 (27 Mar 2021 07:00) (71 - 96)  BP: 130/70 (27 Mar 2021 07:00) (90/57 - 139/78)  BP(mean): 95 (27 Mar 2021 07:00) (66 - 102)  ABP: 89/62 (26 Mar 2021 12:30) (89/62 - 134/97)  ABP(mean): 72 (26 Mar 2021 12:30) (62 - 108)  RR: 21 (27 Mar 2021 07:00) (17 - 36)  SpO2: 94% (27 Mar 2021 07:00) (88% - 98%)      I&O's Summary    26 Mar 2021 07:01  -  27 Mar 2021 07:00  --------------------------------------------------------  IN: 1640 mL / OUT: 1895 mL / NET: -255 mL      Adult Advanced Hemodynamics Last 24 Hrs  CVP(mm Hg): 4 (26 Mar 2021 11:00) (1 - 13)  CVP(cm H2O): --  CO: 6.8 (26 Mar 2021 10:45) (6.8 - 7.6)  CI: 2.9 (26 Mar 2021 10:45) (2.9 - 3.3)  PA: 28/7 (26 Mar 2021 11:00) (27/4 - 38/15)  PA(mean): 15 (26 Mar 2021 11:00) (13 - 25)  PCWP: --  SVR: 672 (26 Mar 2021 08:00) (672 - 672)  SVRI: 1549 (26 Mar 2021 08:00) (1549 - 1549)  PVR: --  PVRI: --      PHYSICAL EXAM:  General: WN/WD NAD    HEENT:     [+] NCAT  [+] EOMI  [-] Conjuctival edema   [-] Icterus   [-] Thrush   [-] ETT  [-] NGT/OGT    Neck:         [+] FROM   [-] JVD     [-] Nodes     [-] Masses    [+] Mid-line trachea    [-] Tracheostomy    Chest:         [-] Sternal click   [-] Sternal drainage   [+] Pacing wires   [+] Chest tubes   [-] SubQ emphysema    Lungs:          [+] CTA   [-] Rhonchi   [-] Rales    [-] Wheezing    [-] Decreased BS    [-] Dullness R L    Cardiac:       [+] S1 [+] S2    [+] RRR   [-] Irregular   [-] S3   [-] S4    [-] Murmurs    [-] Rub    Abdomen:    [+] BS    [+] Soft    [+] Non-tender     [-] Distended    [-] Organomegaly  [-] PEG    Extremities:   [-] Cyanosis U/L   [-] Clubbing  U/L  [-] LE/UE Edema   [+] Capillary refill    [+] Pulses     Neuro:        [+] Awake   [+]  Alert   [-] Confused   [-] Lethargic   [-] Sedated   [-] Generalized Weakness    Skin:        [-] Rashes    [-] Erythema   [+] Normal incisions   [+] IV sites intact   [-] Sacral decubitus    Tubes:  LINES:    CAPILLARY BLOOD GLUCOSE  132 (25 Mar 2021 16:00)      POCT Blood Glucose.: 118 mg/dL (27 Mar 2021 06:32)    CAPILLARY BLOOD GLUCOSE      POCT Blood Glucose.: 118 mg/dL (27 Mar 2021 06:32)  POCT Blood Glucose.: 142 mg/dL (26 Mar 2021 23:09)  POCT Blood Glucose.: 117 mg/dL (26 Mar 2021 16:46)  POCT Blood Glucose.: 158 mg/dL (26 Mar 2021 11:54)      HOSPITAL MEDICATIONS:  MEDICATIONS  (STANDING):  albuterol/ipratropium for Nebulization 3 milliLiter(s) Nebulizer every 6 hours  aspirin enteric coated 325 milliGRAM(s) Oral daily  atorvastatin 40 milliGRAM(s) Oral at bedtime  bisacodyl 5 milliGRAM(s) Oral every 12 hours  chlorhexidine 4% Liquid 1 Application(s) Topical <User Schedule>  dextrose 40% Gel 15 Gram(s) Oral once  dextrose 5%. 1000 milliLiter(s) (50 mL/Hr) IV Continuous <Continuous>  dextrose 5%. 1000 milliLiter(s) (100 mL/Hr) IV Continuous <Continuous>  dextrose 50% Injectable 50 milliLiter(s) IV Push every 15 minutes  dextrose 50% Injectable 25 milliLiter(s) IV Push every 15 minutes  enoxaparin Injectable 40 milliGRAM(s) SubCutaneous daily  famotidine    Tablet 20 milliGRAM(s) Oral two times a day  glucagon  Injectable 1 milliGRAM(s) IntraMuscular once  guaiFENesin  milliGRAM(s) Oral every 12 hours  hydrALAZINE Injectable 10 milliGRAM(s) IV Push once  insulin lispro (ADMELOG) corrective regimen sliding scale   SubCutaneous three times a day before meals  magnesium sulfate  IVPB 1 Gram(s) IV Intermittent every 12 hours  meperidine     Injectable 25 milliGRAM(s) IV Push once  metoprolol tartrate 12.5 milliGRAM(s) Oral two times a day  polyethylene glycol 3350 17 Gram(s) Oral daily  senna 2 Tablet(s) Oral at bedtime    MEDICATIONS  (PRN):  acetaminophen   Tablet .. 650 milliGRAM(s) Oral every 6 hours PRN Temp greater or equal to 38C (100.4F), Mild Pain (1 - 3)  ondansetron  IVPB 4 milliGRAM(s) IV Intermittent once PRN Nausea and/or Vomiting  oxyCODONE    IR 5 milliGRAM(s) Oral every 4 hours PRN Moderate Pain (4 - 6)  oxyCODONE    IR 10 milliGRAM(s) Oral every 4 hours PRN Severe Pain (7 - 10)      LABS:  ABG - ( 26 Mar 2021 12:43 )  pH, Arterial: 7.40  pH, Blood: x     /  pCO2: 39    /  pO2: 110   / HCO3: 24    / Base Excess: -0.5  /  SaO2: 98                                      8.5    11.08 )-----------( 113      ( 27 Mar 2021 01:20 )             25.7     03-27    136  |  103  |  14  ----------------------------<  124<H>  3.8   |  23  |  0.7    Ca    8.3<L>      27 Mar 2021 01:20  Mg     2.4     03-27    TPro  5.7<L>  /  Alb  4.1  /  TBili  0.7  /  DBili  x   /  AST  23  /  ALT  19  /  AlkPhos  35  03-27    PT/INR - ( 27 Mar 2021 01:20 )   PT: 16.60 sec;   INR: 1.44 ratio         PTT - ( 27 Mar 2021 01:20 )  PTT:27.0 sec        RADIOLOGY:  Reviewed and interpreted by me  CXR from 03-27-21 shows [+] mild congestion, [-] pneumothorax, [-] R/L effusion, [-] cardiomegaly,   NGT in place, S-G Catheter in place, R/L TLC in place, R/L Chest Tubes in place    ECG:  Reviewed and interpreted by me:   QTC:    Assessment:      PAST MEDICAL & SURGICAL HISTORY:  CAD (coronary artery disease)    Hearing loss of left ear, unspecified hearing loss type    H/O seasonal allergies    Obesity  s/p gastric sleeve ~ 2013    HTN (hypertension)    LINDA (obstructive sleep apnea)    History of hip replacement, total, left    History of appendectomy    H/O repair of right rotator cuff    H/O bariatric surgery  s/p gastric sleeve        PLAN:  Neuro: Pain control  Pulm: Encourage coughing, deep breathing and use of incentive spirometry. Wean off supplemental oxygen as able. Daily CXR.   Cardio: Monitor telemetry/alarms. Continue cardiac meds  GI: Tolerating diet. Continue stool softeners. Continue GI prophylaxis  Renal: monitor urine output, supplement electrolytes as needed  Vasc: Heparin SC/SCDs for DVT prophylaxis  Heme: Monitor H/H.   ID: Off antibiotics. Stable.  Endocrine: Monitor finger stick blood sugar and control hyperglycemia with insulin  Physical Therapy: OOB/ambulate  Tubes: Monitor Chest tube output      Discussed with Cardiothoracic Team at AM rounds.    45 minutes of critical care time spent providing medical care for patient's acute illness/conditions that impairs at least one vital organ system and/or poses a high risk of imminent or life threatening deterioration in the patient's condition. It includes time spent evaluating and treating the patient's acute illness as well as time spent reviewing labs, radiology, discussing goals of care with patient and/or patient's family, and discussing the case with a multidisciplinary team in an effort to prevent further life threatening deterioration or end organ damage. This time is independent of any procedures performed. CTU Attending Progress Daily Note     27 Mar 2021 07:54    Procedure:      CABG                                            POD#           2        Patient seen as post-op critical care follow-up    HPI:  60M w/ PMHx of HTN, CAD(multivessel on CTA), LINDA, and Gastric Bypass. Pt here for staged PCI/ of mLAD had diagnostic cath 3/17/21, pt reports SOB with minimal exertion and chest burning over past few months .     See preop testing chart H&P    Interval event for past 24 hr:  ROBER HUDSON  60y had hypoxemia on O2    Current Complains:  ROBER HUDSON has no new complaints    REVIEW OF SYSTEMS:  CONSTITUTIONAL:  [-] weakness, [-] fevers, [-] chills  EYES/ENT: [-] visual changes, [-] vertigo, [-] throat pain   NECK: [-] pain, [-] stiffness  RESPIRATORY: [-] cough, [-] wheezing, [-] hemoptysis, [-] shortness of breath  CARDIOVASCULAR: [-] chest pain, [-] palpitations, [-] orthopnea  GASTROINTESTINAL:    [-]abdominal pain, [-] nausea, [-] vomiting, [-] hematemesis, [-] diarrhea, [-] constipation, [-] melena, [-] hematochezia.  GENITOURINARY: [-] dysuria, [-] frequency, [-] hematuria  NEUROLOGICAL: [-] numbness, [-] weakness  SKIN: [-] itching, [-] burning, [-] rashes, [-] lesions   All other review of systems is negative unless indicated above.    [  ] Unable to assess ROS because :    OBJECTIVE:  ICU Vital Signs Last 24 Hrs  T(C): 37 (27 Mar 2021 04:00), Max: 37.1 (26 Mar 2021 20:00)  T(F): 98.6 (27 Mar 2021 04:00), Max: 98.8 (26 Mar 2021 20:00)  HR: 88 (27 Mar 2021 07:00) (71 - 96)  BP: 130/70 (27 Mar 2021 07:00) (90/57 - 139/78)  BP(mean): 95 (27 Mar 2021 07:00) (66 - 102)  ABP: 89/62 (26 Mar 2021 12:30) (89/62 - 134/97)  ABP(mean): 72 (26 Mar 2021 12:30) (62 - 108)  RR: 21 (27 Mar 2021 07:00) (17 - 36)  SpO2: 94% (27 Mar 2021 07:00) (88% - 98%)      I&O's Summary    26 Mar 2021 07:01  -  27 Mar 2021 07:00  --------------------------------------------------------  IN: 1640 mL / OUT: 1895 mL / NET: -255 mL      Adult Advanced Hemodynamics Last 24 Hrs  CVP(mm Hg): 4 (26 Mar 2021 11:00) (1 - 13)  CVP(cm H2O): --  CO: 6.8 (26 Mar 2021 10:45) (6.8 - 7.6)  CI: 2.9 (26 Mar 2021 10:45) (2.9 - 3.3)  PA: 28/7 (26 Mar 2021 11:00) (27/4 - 38/15)  PA(mean): 15 (26 Mar 2021 11:00) (13 - 25)  PCWP: --  SVR: 672 (26 Mar 2021 08:00) (672 - 672)  SVRI: 1549 (26 Mar 2021 08:00) (1549 - 1549)  PVR: --  PVRI: --      PHYSICAL EXAM:  General: WN/WD NAD    HEENT:     [+] NCAT  [+] EOMI  [-] Conjuctival edema   [-] Icterus   [-] Thrush   [-] ETT  [-] NGT/OGT    Neck:         [+] FROM   [-] JVD     [-] Nodes     [-] Masses    [+] Mid-line trachea    [-] Tracheostomy    Chest:         [-] Sternal click   [-] Sternal drainage   [+] Pacing wires   [+] Chest tubes   [-] SubQ emphysema    Lungs:          [+] CTA   [-] Rhonchi   [-] Rales    [-] Wheezing    [-] Decreased BS    [-] Dullness R L    Cardiac:       [+] S1 [+] S2    [+] RRR   [-] Irregular   [-] S3   [-] S4    [-] Murmurs    [-] Rub    Abdomen:    [+] BS    [+] Soft    [+] Non-tender     [-] Distended    [-] Organomegaly  [-] PEG    Extremities:   [-] Cyanosis U/L   [-] Clubbing  U/L  [-] LE/UE Edema   [+] Capillary refill    [+] Pulses     Neuro:        [+] Awake   [+]  Alert   [-] Confused   [-] Lethargic   [-] Sedated   [-] Generalized Weakness    Skin:        [-] Rashes    [-] Erythema   [+] Normal incisions   [+] IV sites intact   [-] Sacral decubitus    Tubes:  LINES:    CAPILLARY BLOOD GLUCOSE  132 (25 Mar 2021 16:00)      POCT Blood Glucose.: 118 mg/dL (27 Mar 2021 06:32)    CAPILLARY BLOOD GLUCOSE      POCT Blood Glucose.: 118 mg/dL (27 Mar 2021 06:32)  POCT Blood Glucose.: 142 mg/dL (26 Mar 2021 23:09)  POCT Blood Glucose.: 117 mg/dL (26 Mar 2021 16:46)  POCT Blood Glucose.: 158 mg/dL (26 Mar 2021 11:54)      HOSPITAL MEDICATIONS:  MEDICATIONS  (STANDING):  albuterol/ipratropium for Nebulization 3 milliLiter(s) Nebulizer every 6 hours  aspirin enteric coated 325 milliGRAM(s) Oral daily  atorvastatin 40 milliGRAM(s) Oral at bedtime  bisacodyl 5 milliGRAM(s) Oral every 12 hours  chlorhexidine 4% Liquid 1 Application(s) Topical <User Schedule>  dextrose 40% Gel 15 Gram(s) Oral once  dextrose 5%. 1000 milliLiter(s) (50 mL/Hr) IV Continuous <Continuous>  dextrose 5%. 1000 milliLiter(s) (100 mL/Hr) IV Continuous <Continuous>  dextrose 50% Injectable 50 milliLiter(s) IV Push every 15 minutes  dextrose 50% Injectable 25 milliLiter(s) IV Push every 15 minutes  enoxaparin Injectable 40 milliGRAM(s) SubCutaneous daily  famotidine    Tablet 20 milliGRAM(s) Oral two times a day  glucagon  Injectable 1 milliGRAM(s) IntraMuscular once  guaiFENesin  milliGRAM(s) Oral every 12 hours  hydrALAZINE Injectable 10 milliGRAM(s) IV Push once  insulin lispro (ADMELOG) corrective regimen sliding scale   SubCutaneous three times a day before meals  magnesium sulfate  IVPB 1 Gram(s) IV Intermittent every 12 hours  meperidine     Injectable 25 milliGRAM(s) IV Push once  metoprolol tartrate 12.5 milliGRAM(s) Oral two times a day  polyethylene glycol 3350 17 Gram(s) Oral daily  senna 2 Tablet(s) Oral at bedtime    MEDICATIONS  (PRN):  acetaminophen   Tablet .. 650 milliGRAM(s) Oral every 6 hours PRN Temp greater or equal to 38C (100.4F), Mild Pain (1 - 3)  ondansetron  IVPB 4 milliGRAM(s) IV Intermittent once PRN Nausea and/or Vomiting  oxyCODONE    IR 5 milliGRAM(s) Oral every 4 hours PRN Moderate Pain (4 - 6)  oxyCODONE    IR 10 milliGRAM(s) Oral every 4 hours PRN Severe Pain (7 - 10)      LABS:  ABG - ( 26 Mar 2021 12:43 )  pH, Arterial: 7.40  pH, Blood: x     /  pCO2: 39    /  pO2: 110   / HCO3: 24    / Base Excess: -0.5  /  SaO2: 98                                      8.5    11.08 )-----------( 113      ( 27 Mar 2021 01:20 )             25.7     03-27    136  |  103  |  14  ----------------------------<  124<H>  3.8   |  23  |  0.7    Ca    8.3<L>      27 Mar 2021 01:20  Mg     2.4     03-27    TPro  5.7<L>  /  Alb  4.1  /  TBili  0.7  /  DBili  x   /  AST  23  /  ALT  19  /  AlkPhos  35  03-27    PT/INR - ( 27 Mar 2021 01:20 )   PT: 16.60 sec;   INR: 1.44 ratio         PTT - ( 27 Mar 2021 01:20 )  PTT:27.0 sec        RADIOLOGY:  Reviewed and interpreted by me  CXR from 03-27-21 shows [+] mild congestion, [-] pneumothorax, [-] R/L effusion, [-] cardiomegaly,       ECG:  Reviewed and interpreted by me:   QTC:    Assessment:  CAD SP CABG  Acute blood loss anemia  hypoxemia on O2      PAST MEDICAL & SURGICAL HISTORY:  CAD (coronary artery disease)    Hearing loss of left ear, unspecified hearing loss type    H/O seasonal allergies    Obesity  s/p gastric sleeve ~ 2013    HTN (hypertension)    LINDA (obstructive sleep apnea)    History of hip replacement, total, left    History of appendectomy    H/O repair of right rotator cuff    H/O bariatric surgery  s/p gastric sleeve        PLAN:  Neuro: Pain control  Pulm: Encourage coughing, deep breathing and use of incentive spirometry. Wean off supplemental oxygen as able. Daily CXR.   Cardio: Monitor telemetry/alarms. Continue cardiac meds  GI: Tolerating diet. Continue stool softeners. Continue GI prophylaxis  Renal: monitor urine output, supplement electrolytes as needed  Vasc: Heparin SC/SCDs for DVT prophylaxis  Heme: Monitor H/H.   ID: Off antibiotics. Stable.  Endocrine: Monitor finger stick blood sugar and control hyperglycemia with insulin  Physical Therapy: OOB/ambulate  Tubes: Monitor Chest tube output      Discussed with Cardiothoracic Team at AM rounds.

## 2021-03-27 NOTE — PROGRESS NOTE ADULT - SUBJECTIVE AND OBJECTIVE BOX
OPERATIVE PROCEDURE(s):                POD #  2 s/p CABG                     60yMale  SURGEON(s): LATOYA Ag  SUBJECTIVE ASSESSMENT: incisional pain  Vital Signs Last 24 Hrs  T(F): 98.6 (27 Mar 2021 04:00), Max: 98.8 (26 Mar 2021 20:00)  HR: 88 (27 Mar 2021 07:00) (71 - 96)  BP: 130/70 (27 Mar 2021 07:00) (90/57 - 139/78)  BP(mean): 95 (27 Mar 2021 07:00) (66 - 102)  ABP: 89/62 (26 Mar 2021 12:30) (89/62 - 134/97)  ABP(mean): 72 (26 Mar 2021 12:30)  RR: 21 (27 Mar 2021 07:00) (17 - 36)  SpO2: 94% (27 Mar 2021 07:00) (88% - 98%)  CVP(mm Hg): 4 (26 Mar 2021 11:00)  CVP(cm H2O): --  CO: 6.8 (26 Mar 2021 10:45)  CI: 2.9 (26 Mar 2021 10:45)  PA: 28/7 (26 Mar 2021 11:00)  SVR: 672 (26 Mar 2021 08:00)    I&O's Detail    26 Mar 2021 07:01  -  27 Mar 2021 07:00  --------------------------------------------------------  IN:    Albumin 5%  - 500 mL: 500 mL    IV PiggyBack: 350 mL    Oral Fluid: 750 mL    sodium chloride 0.9%: 40 mL  Total IN: 1640 mL    OUT:    Chest Tube (mL): 110 mL    Chest Tube (mL): 400 mL    Chest Tube (mL): 30 mL    Indwelling Catheter - Urethral (mL): 1355 mL    Norepinephrine: 0 mL  Total OUT: 1895 mL        Net:   I&O's Detail    25 Mar 2021 07:01  -  26 Mar 2021 07:00  --------------------------------------------------------  Total NET: -28.3 mL      26 Mar 2021 07:01  -  27 Mar 2021 07:00  --------------------------------------------------------  Total NET: -255 mL        CAPILLARY BLOOD GLUCOSE      POCT Blood Glucose.: 118 mg/dL (27 Mar 2021 06:32)  POCT Blood Glucose.: 142 mg/dL (26 Mar 2021 23:09)  POCT Blood Glucose.: 117 mg/dL (26 Mar 2021 16:46)  POCT Blood Glucose.: 158 mg/dL (26 Mar 2021 11:54)    Physical Exam:  General: NAD; A&Ox3  Cardiac: S1/S2, RRR, no murmur, no rubs  Lungs: unlabored respirations, CTA b/l, no wheeze, no rales, no crackles  Abdomen: Soft/NT/ND; positive bowel sounds x 4  Sternum: Intact, no click, incision healing well with no drainage  Incisions: Incisions clean/dry/intact  Extremities: No edema b/l lower extremities; good capillary refill; no cyanosis; palpable 1+ pedal pulses b/l    Central Venous Catheter: Yes[]    BOWEL MOVEMENT:  no  CHEST TUBE(Left/Right):  [] YES       LABS:                        8.5<L>  11.08<H> )-----------( 113<L>    ( 27 Mar 2021 01:20 )             25.7<L>                        9.2<L>  13.07<H> )-----------( 126<L>    ( 26 Mar 2021 01:20 )             27.3<L>    03-27    136  |  103  |  14  ----------------------------<  124<H>  3.8   |  23  |  0.7  03-26    138  |  107  |  10  ----------------------------<  129<H>  4.2   |  22  |  0.8    Ca    8.3<L>      27 Mar 2021 01:20  Mg     2.4     03-27    TPro  5.7<L> [6.0 - 8.0]  /  Alb  4.1 [3.5 - 5.2]  /  TBili  0.7 [0.2 - 1.2]  /  DBili  x   /  AST  23 [0 - 41]  /  ALT  19 [0 - 41]  /  AlkPhos  35 [30 - 115]  03-27    PT/INR - ( 27 Mar 2021 01:20 )   PT: ;   INR: 1.44 ratio         PT/INR - ( 26 Mar 2021 01:20 )   PT: ;   INR: 1.38 ratio         PTT - ( 27 Mar 2021 01:20 )  PTT:27.0 sec, PTT - ( 26 Mar 2021 01:20 )  PTT:28.5 sec    ABG - ( 26 Mar 2021 12:43 )  pH: 7.40  /  pCO2: 39    /  pO2: 110   / HCO3: 24    / Base Excess: -0.5  /  SaO2: 98    /  LA: 2.0        RADIOLOGY & ADDITIONAL TESTS:  CXR:  EKG:  MEDICATIONS  (STANDING):  albuterol/ipratropium for Nebulization 3 milliLiter(s) Nebulizer every 6 hours  aspirin enteric coated 325 milliGRAM(s) Oral daily  atorvastatin 40 milliGRAM(s) Oral at bedtime  bisacodyl 5 milliGRAM(s) Oral every 12 hours  chlorhexidine 4% Liquid 1 Application(s) Topical <User Schedule>  dextrose 40% Gel 15 Gram(s) Oral once  dextrose 5%. 1000 milliLiter(s) (50 mL/Hr) IV Continuous <Continuous>  dextrose 5%. 1000 milliLiter(s) (100 mL/Hr) IV Continuous <Continuous>  dextrose 50% Injectable 50 milliLiter(s) IV Push every 15 minutes  dextrose 50% Injectable 25 milliLiter(s) IV Push every 15 minutes  enoxaparin Injectable 40 milliGRAM(s) SubCutaneous daily  famotidine    Tablet 20 milliGRAM(s) Oral two times a day  glucagon  Injectable 1 milliGRAM(s) IntraMuscular once  guaiFENesin  milliGRAM(s) Oral every 12 hours  insulin lispro (ADMELOG) corrective regimen sliding scale   SubCutaneous three times a day before meals  magnesium sulfate  IVPB 1 Gram(s) IV Intermittent every 12 hours  meperidine     Injectable 25 milliGRAM(s) IV Push once  metoprolol tartrate 12.5 milliGRAM(s) Oral two times a day  polyethylene glycol 3350 17 Gram(s) Oral daily  senna 2 Tablet(s) Oral at bedtime    MEDICATIONS  (PRN):  acetaminophen   Tablet .. 650 milliGRAM(s) Oral every 6 hours PRN Temp greater or equal to 38C (100.4F), Mild Pain (1 - 3)  ondansetron  IVPB 4 milliGRAM(s) IV Intermittent once PRN Nausea and/or Vomiting  oxyCODONE    IR 5 milliGRAM(s) Oral every 4 hours PRN Moderate Pain (4 - 6)  oxyCODONE    IR 10 milliGRAM(s) Oral every 4 hours PRN Severe Pain (7 - 10)    Allergies    No Known Allergies    Intolerances      Ambulation/Activity Status: ambulate as tolerated    Assessment/Plan:  60y Male status-post Emergent CABG1 [place]-LAD               POD #  2   - Case and plan discussed with CTU Intensivist and CT Surgeon - Dr. Ag  - Continue CTU supportive care and ongoing plan of care as per continuing CTU rounds.    - Continue DVT/GI prophylaxis  - Incentive Spirometry 10 times an hour  - Continue to advance physical activity as tolerated and continue PT/OT as directed  1. CAD s/p CABG: Continue ASA, statin, BB. start Plaviix in AS  2. A. Fib ppx: cont Mag 1gm IV BID and BB.   3. COPD/Hypoxia: Duoneb.   4. DM/Glucose Control: non-diabetic cont riss.    Social Service Disposition:  anticipate home next week   OPERATIVE PROCEDURE(s):                POD #  2 s/p CABG                     60yMale  SURGEON(s): LATOYA Ag  SUBJECTIVE ASSESSMENT: incisional pain  Vital Signs Last 24 Hrs  T(F): 98.6 (27 Mar 2021 04:00), Max: 98.8 (26 Mar 2021 20:00)  HR: 88 (27 Mar 2021 07:00) (71 - 96)  BP: 130/70 (27 Mar 2021 07:00) (90/57 - 139/78)  BP(mean): 95 (27 Mar 2021 07:00) (66 - 102)  ABP: 89/62 (26 Mar 2021 12:30) (89/62 - 134/97)  ABP(mean): 72 (26 Mar 2021 12:30)  RR: 21 (27 Mar 2021 07:00) (17 - 36)  SpO2: 94% (27 Mar 2021 07:00) (88% - 98%)  CVP(mm Hg): 4 (26 Mar 2021 11:00)  CVP(cm H2O): --  CO: 6.8 (26 Mar 2021 10:45)  CI: 2.9 (26 Mar 2021 10:45)  PA: 28/7 (26 Mar 2021 11:00)  SVR: 672 (26 Mar 2021 08:00)    I&O's Detail    26 Mar 2021 07:01  -  27 Mar 2021 07:00  --------------------------------------------------------  IN:    Albumin 5%  - 500 mL: 500 mL    IV PiggyBack: 350 mL    Oral Fluid: 750 mL    sodium chloride 0.9%: 40 mL  Total IN: 1640 mL    OUT:    Chest Tube (mL): 110 mL    Chest Tube (mL): 400 mL    Chest Tube (mL): 30 mL    Indwelling Catheter - Urethral (mL): 1355 mL    Norepinephrine: 0 mL  Total OUT: 1895 mL        Net:   I&O's Detail    25 Mar 2021 07:01  -  26 Mar 2021 07:00  --------------------------------------------------------  Total NET: -28.3 mL      26 Mar 2021 07:01  -  27 Mar 2021 07:00  --------------------------------------------------------  Total NET: -255 mL        CAPILLARY BLOOD GLUCOSE      POCT Blood Glucose.: 118 mg/dL (27 Mar 2021 06:32)  POCT Blood Glucose.: 142 mg/dL (26 Mar 2021 23:09)  POCT Blood Glucose.: 117 mg/dL (26 Mar 2021 16:46)  POCT Blood Glucose.: 158 mg/dL (26 Mar 2021 11:54)    Physical Exam:  General: NAD; A&Ox3  Cardiac: S1/S2, RRR, no murmur, no rubs  Lungs: unlabored shallow respirations, bs decreased at bases  Abdomen: Soft/NT/ND; positive bowel sounds x 4  Sternum: Intact, no click, incision healing well with no drainage  Incisions: Incisions clean/dry/intact  Extremities: No edema b/l lower extremities    Central Venous Catheter: Yes[]    BOWEL MOVEMENT:  no  CHEST TUBE(Left/Right):  [] YES       LABS:                        8.5<L>  11.08<H> )-----------( 113<L>    ( 27 Mar 2021 01:20 )             25.7<L>                        9.2<L>  13.07<H> )-----------( 126<L>    ( 26 Mar 2021 01:20 )             27.3<L>    03-27    136  |  103  |  14  ----------------------------<  124<H>  3.8   |  23  |  0.7  03-26    138  |  107  |  10  ----------------------------<  129<H>  4.2   |  22  |  0.8    Ca    8.3<L>      27 Mar 2021 01:20  Mg     2.4     03-27    TPro  5.7<L> [6.0 - 8.0]  /  Alb  4.1 [3.5 - 5.2]  /  TBili  0.7 [0.2 - 1.2]  /  DBili  x   /  AST  23 [0 - 41]  /  ALT  19 [0 - 41]  /  AlkPhos  35 [30 - 115]  03-27    PT/INR - ( 27 Mar 2021 01:20 )   PT: ;   INR: 1.44 ratio         PT/INR - ( 26 Mar 2021 01:20 )   PT: ;   INR: 1.38 ratio         PTT - ( 27 Mar 2021 01:20 )  PTT:27.0 sec, PTT - ( 26 Mar 2021 01:20 )  PTT:28.5 sec    ABG - ( 26 Mar 2021 12:43 )  pH: 7.40  /  pCO2: 39    /  pO2: 110   / HCO3: 24    / Base Excess: -0.5  /  SaO2: 98    /  LA: 2.0        RADIOLOGY & ADDITIONAL TESTS:  CXR: < from: Xray Chest 1 View-PORTABLE IMMEDIATE (Xray Chest 1 View-PORTABLE IMMEDIATE .) (03.27.21 @ 13:40) >  Small left apical pneumothorax. Decreased previous left lower lobe opacity/pleural effusion.    < end of copied text >    EKG: < from: 12 Lead ECG (03.27.21 @ 08:53) >    Ventricular Rate 117 BPM    Atrial Rate 117 BPM    P-R Interval 154 ms    QRS Duration 90 ms    Q-T Interval 334 ms    QTC Calculation(Bazett) 465 ms    P Axis 51 degrees    R Axis 4 degrees    T Axis 24 degrees    Diagnosis Line Sinus tachycardia  Otherwise normal ECG    < end of copied text >    MEDICATIONS  (STANDING):  albuterol/ipratropium for Nebulization 3 milliLiter(s) Nebulizer every 6 hours  aspirin enteric coated 325 milliGRAM(s) Oral daily  atorvastatin 40 milliGRAM(s) Oral at bedtime  bisacodyl 5 milliGRAM(s) Oral every 12 hours  chlorhexidine 4% Liquid 1 Application(s) Topical <User Schedule>  dextrose 40% Gel 15 Gram(s) Oral once  dextrose 5%. 1000 milliLiter(s) (50 mL/Hr) IV Continuous <Continuous>  dextrose 5%. 1000 milliLiter(s) (100 mL/Hr) IV Continuous <Continuous>  dextrose 50% Injectable 50 milliLiter(s) IV Push every 15 minutes  dextrose 50% Injectable 25 milliLiter(s) IV Push every 15 minutes  enoxaparin Injectable 40 milliGRAM(s) SubCutaneous daily  famotidine    Tablet 20 milliGRAM(s) Oral two times a day  glucagon  Injectable 1 milliGRAM(s) IntraMuscular once  guaiFENesin  milliGRAM(s) Oral every 12 hours  insulin lispro (ADMELOG) corrective regimen sliding scale   SubCutaneous three times a day before meals  magnesium sulfate  IVPB 1 Gram(s) IV Intermittent every 12 hours  meperidine     Injectable 25 milliGRAM(s) IV Push once  metoprolol tartrate 12.5 milliGRAM(s) Oral two times a day  polyethylene glycol 3350 17 Gram(s) Oral daily  senna 2 Tablet(s) Oral at bedtime    MEDICATIONS  (PRN):  acetaminophen   Tablet .. 650 milliGRAM(s) Oral every 6 hours PRN Temp greater or equal to 38C (100.4F), Mild Pain (1 - 3)  ondansetron  IVPB 4 milliGRAM(s) IV Intermittent once PRN Nausea and/or Vomiting  oxyCODONE    IR 5 milliGRAM(s) Oral every 4 hours PRN Moderate Pain (4 - 6)  oxyCODONE    IR 10 milliGRAM(s) Oral every 4 hours PRN Severe Pain (7 - 10)    Allergies    No Known Allergies    Intolerances      Ambulation/Activity Status: ambulate as tolerated    Assessment/Plan:  60y Male status-post Emergent CABG1 [place]-LAD               POD #  2   - Case and plan discussed with CTU Intensivist and CT Surgeon - Dr. Ag  - Continue CTU supportive care and ongoing plan of care as per continuing CTU rounds.    - Continue DVT/GI prophylaxis  - Incentive Spirometry 10 times an hour  - Continue to advance physical activity as tolerated and continue PT/OT as directed  1. CAD s/p CABG: Continue ASA, statin, BB. start Plaviix in AM  2. A. Fib ppx: cont Mag 1gm IV BID and BB.   3. COPD/Hypoxia: Duoneb.   4. DM/Glucose Control: non-diabetic cont riss.  5. Remove chest tub  6. lasix for mild pulmonary congestion  7. track and trend unchanged left apical phneumothorax  8. remove cordis    d/c in tomorrow

## 2021-03-28 ENCOUNTER — TRANSCRIPTION ENCOUNTER (OUTPATIENT)
Age: 61
End: 2021-03-28

## 2021-03-28 VITALS
DIASTOLIC BLOOD PRESSURE: 51 MMHG | OXYGEN SATURATION: 92 % | RESPIRATION RATE: 33 BRPM | HEART RATE: 101 BPM | SYSTOLIC BLOOD PRESSURE: 104 MMHG | TEMPERATURE: 99 F

## 2021-03-28 LAB
ALBUMIN SERPL ELPH-MCNC: 3.8 G/DL — SIGNIFICANT CHANGE UP (ref 3.5–5.2)
ALP SERPL-CCNC: 41 U/L — SIGNIFICANT CHANGE UP (ref 30–115)
ALT FLD-CCNC: 21 U/L — SIGNIFICANT CHANGE UP (ref 0–41)
ANION GAP SERPL CALC-SCNC: 8 MMOL/L — SIGNIFICANT CHANGE UP (ref 7–14)
AST SERPL-CCNC: 23 U/L — SIGNIFICANT CHANGE UP (ref 0–41)
BASOPHILS # BLD AUTO: 0.02 K/UL — SIGNIFICANT CHANGE UP (ref 0–0.2)
BASOPHILS NFR BLD AUTO: 0.2 % — SIGNIFICANT CHANGE UP (ref 0–1)
BILIRUB SERPL-MCNC: 0.8 MG/DL — SIGNIFICANT CHANGE UP (ref 0.2–1.2)
BUN SERPL-MCNC: 13 MG/DL — SIGNIFICANT CHANGE UP (ref 10–20)
CALCIUM SERPL-MCNC: 8.4 MG/DL — LOW (ref 8.5–10.1)
CHLORIDE SERPL-SCNC: 104 MMOL/L — SIGNIFICANT CHANGE UP (ref 98–110)
CO2 SERPL-SCNC: 25 MMOL/L — SIGNIFICANT CHANGE UP (ref 17–32)
CREAT SERPL-MCNC: 0.6 MG/DL — LOW (ref 0.7–1.5)
EOSINOPHIL # BLD AUTO: 0.1 K/UL — SIGNIFICANT CHANGE UP (ref 0–0.7)
EOSINOPHIL NFR BLD AUTO: 0.9 % — SIGNIFICANT CHANGE UP (ref 0–8)
GLUCOSE BLDC GLUCOMTR-MCNC: 115 MG/DL — HIGH (ref 70–99)
GLUCOSE SERPL-MCNC: 107 MG/DL — HIGH (ref 70–99)
HCT VFR BLD CALC: 23.2 % — LOW (ref 42–52)
HGB BLD-MCNC: 7.9 G/DL — LOW (ref 14–18)
IMM GRANULOCYTES NFR BLD AUTO: 0.6 % — HIGH (ref 0.1–0.3)
LYMPHOCYTES # BLD AUTO: 1.36 K/UL — SIGNIFICANT CHANGE UP (ref 1.2–3.4)
LYMPHOCYTES # BLD AUTO: 12.5 % — LOW (ref 20.5–51.1)
MAGNESIUM SERPL-MCNC: 2.3 MG/DL — SIGNIFICANT CHANGE UP (ref 1.8–2.4)
MCHC RBC-ENTMCNC: 30.4 PG — SIGNIFICANT CHANGE UP (ref 27–31)
MCHC RBC-ENTMCNC: 34.1 G/DL — SIGNIFICANT CHANGE UP (ref 32–37)
MCV RBC AUTO: 89.2 FL — SIGNIFICANT CHANGE UP (ref 80–94)
MONOCYTES # BLD AUTO: 0.65 K/UL — HIGH (ref 0.1–0.6)
MONOCYTES NFR BLD AUTO: 6 % — SIGNIFICANT CHANGE UP (ref 1.7–9.3)
NEUTROPHILS # BLD AUTO: 8.68 K/UL — HIGH (ref 1.4–6.5)
NEUTROPHILS NFR BLD AUTO: 79.8 % — HIGH (ref 42.2–75.2)
NRBC # BLD: 0 /100 WBCS — SIGNIFICANT CHANGE UP (ref 0–0)
PLATELET # BLD AUTO: 119 K/UL — LOW (ref 130–400)
POTASSIUM SERPL-MCNC: 3.6 MMOL/L — SIGNIFICANT CHANGE UP (ref 3.5–5)
POTASSIUM SERPL-SCNC: 3.6 MMOL/L — SIGNIFICANT CHANGE UP (ref 3.5–5)
PROT SERPL-MCNC: 5.5 G/DL — LOW (ref 6–8)
RBC # BLD: 2.6 M/UL — LOW (ref 4.7–6.1)
RBC # FLD: 13.2 % — SIGNIFICANT CHANGE UP (ref 11.5–14.5)
SODIUM SERPL-SCNC: 137 MMOL/L — SIGNIFICANT CHANGE UP (ref 135–146)
WBC # BLD: 10.88 K/UL — HIGH (ref 4.8–10.8)
WBC # FLD AUTO: 10.88 K/UL — HIGH (ref 4.8–10.8)

## 2021-03-28 PROCEDURE — 93010 ELECTROCARDIOGRAM REPORT: CPT

## 2021-03-28 PROCEDURE — 71045 X-RAY EXAM CHEST 1 VIEW: CPT | Mod: 26

## 2021-03-28 RX ORDER — POTASSIUM CHLORIDE 20 MEQ
40 PACKET (EA) ORAL ONCE
Refills: 0 | Status: COMPLETED | OUTPATIENT
Start: 2021-03-28 | End: 2021-03-28

## 2021-03-28 RX ORDER — POLYETHYLENE GLYCOL 3350 17 G/17G
17 POWDER, FOR SOLUTION ORAL
Qty: 170 | Refills: 0
Start: 2021-03-28 | End: 2021-04-06

## 2021-03-28 RX ORDER — RAMIPRIL 5 MG
1 CAPSULE ORAL
Qty: 0 | Refills: 0 | DISCHARGE

## 2021-03-28 RX ORDER — POTASSIUM CHLORIDE 20 MEQ
20 PACKET (EA) ORAL
Refills: 0 | Status: COMPLETED | OUTPATIENT
Start: 2021-03-28 | End: 2021-03-28

## 2021-03-28 RX ORDER — OXYCODONE HYDROCHLORIDE 5 MG/1
1 TABLET ORAL
Qty: 30 | Refills: 0
Start: 2021-03-28 | End: 2021-04-01

## 2021-03-28 RX ORDER — SENNA PLUS 8.6 MG/1
2 TABLET ORAL
Qty: 20 | Refills: 0
Start: 2021-03-28 | End: 2021-04-06

## 2021-03-28 RX ORDER — METOPROLOL TARTRATE 50 MG
0.5 TABLET ORAL
Qty: 15 | Refills: 0
Start: 2021-03-28 | End: 2021-04-26

## 2021-03-28 RX ORDER — FUROSEMIDE 40 MG
1 TABLET ORAL
Qty: 5 | Refills: 0
Start: 2021-03-28 | End: 2021-04-01

## 2021-03-28 RX ORDER — CLOPIDOGREL BISULFATE 75 MG/1
1 TABLET, FILM COATED ORAL
Qty: 30 | Refills: 0
Start: 2021-03-28 | End: 2021-04-26

## 2021-03-28 RX ORDER — POTASSIUM CHLORIDE 20 MEQ
1 PACKET (EA) ORAL
Qty: 5 | Refills: 0
Start: 2021-03-28 | End: 2021-04-01

## 2021-03-28 RX ORDER — FAMOTIDINE 10 MG/ML
1 INJECTION INTRAVENOUS
Qty: 20 | Refills: 0
Start: 2021-03-28 | End: 2021-04-06

## 2021-03-28 RX ORDER — FUROSEMIDE 40 MG
20 TABLET ORAL ONCE
Refills: 0 | Status: COMPLETED | OUTPATIENT
Start: 2021-03-28 | End: 2021-03-28

## 2021-03-28 RX ADMIN — Medication 5 MILLIGRAM(S): at 05:22

## 2021-03-28 RX ADMIN — Medication 20 MILLIEQUIVALENT(S): at 13:54

## 2021-03-28 RX ADMIN — Medication 600 MILLIGRAM(S): at 05:21

## 2021-03-28 RX ADMIN — Medication 20 MILLIEQUIVALENT(S): at 11:17

## 2021-03-28 RX ADMIN — CLOPIDOGREL BISULFATE 75 MILLIGRAM(S): 75 TABLET, FILM COATED ORAL at 11:15

## 2021-03-28 RX ADMIN — FAMOTIDINE 20 MILLIGRAM(S): 10 INJECTION INTRAVENOUS at 05:21

## 2021-03-28 RX ADMIN — ENOXAPARIN SODIUM 40 MILLIGRAM(S): 100 INJECTION SUBCUTANEOUS at 11:16

## 2021-03-28 RX ADMIN — Medication 100 GRAM(S): at 05:23

## 2021-03-28 RX ADMIN — Medication 12.5 MILLIGRAM(S): at 05:22

## 2021-03-28 RX ADMIN — Medication 20 MILLIGRAM(S): at 10:05

## 2021-03-28 RX ADMIN — CHLORHEXIDINE GLUCONATE 1 APPLICATION(S): 213 SOLUTION TOPICAL at 05:22

## 2021-03-28 RX ADMIN — Medication 325 MILLIGRAM(S): at 11:15

## 2021-03-28 RX ADMIN — Medication 40 MILLIEQUIVALENT(S): at 08:05

## 2021-03-28 NOTE — PROGRESS NOTE ADULT - SUBJECTIVE AND OBJECTIVE BOX
OPERATIVE PROCEDURE(s):                POD #                       60yMale  SURGEON(s): LATOYA Ag  SUBJECTIVE ASSESSMENT:   Vital Signs Last 24 Hrs  T(F): 98.3 (28 Mar 2021 04:00), Max: 98.9 (27 Mar 2021 12:00)  HR: 107 (28 Mar 2021 07:00) (67 - 107)  BP: 131/75 (28 Mar 2021 06:00) (99/58 - 156/72)  BP(mean): 99 (28 Mar 2021 06:00) (73 - 103)  ABP: --  ABP(mean): --  RR: 39 (28 Mar 2021 07:00) (0 - 39)  SpO2: 96% (28 Mar 2021 07:00) (90% - 100%)  CVP(mm Hg): --  CVP(cm H2O): --  CO: --  CI: --  PA: --  SVR: --    I&O's Detail    27 Mar 2021 07:01  -  28 Mar 2021 07:00  --------------------------------------------------------  IN:    IV PiggyBack: 100 mL    Oral Fluid: 472 mL  Total IN: 572 mL    OUT:    Chest Tube (mL): 160 mL    Indwelling Catheter - Urethral (mL): 735 mL    Voided (mL): 200 mL  Total OUT: 1095 mL        Net:   I&O's Detail    26 Mar 2021 07:01  -  27 Mar 2021 07:00  --------------------------------------------------------  Total NET: -265 mL      27 Mar 2021 07:01  -  28 Mar 2021 07:00  --------------------------------------------------------  Total NET: -523 mL        CAPILLARY BLOOD GLUCOSE      POCT Blood Glucose.: 115 mg/dL (28 Mar 2021 01:19)  POCT Blood Glucose.: 96 mg/dL (27 Mar 2021 16:31)    Physical Exam:  General: NAD; A&Ox3  Cardiac: S1/S2, RRR, no murmur, no rubs  Lungs: unlabored respirations, CTA b/l, no wheeze, no rales, no crackles  Abdomen: Soft/NT/ND; positive bowel sounds x 4  Sternum: Intact, no click, incision healing well with no drainage  Incisions: Incisions clean/dry/intact  Extremities: No edema b/l lower extremities; good capillary refill; no cyanosis; palpable 1+ pedal pulses b/l    Central Venous Catheter: Yes[]  No[] , If Yes indication:           Day #  [place] Catheter: Yes  [] , No  [] , If yes indication:                      Day #  NGT: Yes [] No [] ,    If Yes Placement:                                     Day #  EPICARDIAL WIRES:  [] YES [] NO                                              Day #  BOWEL MOVEMENT:  [] YES [] NO, If No, Timing since last BM:      Day #  CHEST TUBE(Left/Right):  [] YES [] NO, If yes -  AIR LEAKS:  [] YES [] NO        LABS:                        7.9<L>  10.88<H> )-----------( 119<L>    ( 28 Mar 2021 01:40 )             23.2<L>                        8.5<L>  11.08<H> )-----------( 113<L>    ( 27 Mar 2021 01:20 )             25.7<L>    03-28    137  |  104  |  13  ----------------------------<  107<H>  3.6   |  25  |  0.6<L>  03-27    136  |  103  |  14  ----------------------------<  124<H>  3.8   |  23  |  0.7    Ca    8.4<L>      28 Mar 2021 01:40  Mg     2.3     03-28    TPro  5.5<L> [6.0 - 8.0]  /  Alb  3.8 [3.5 - 5.2]  /  TBili  0.8 [0.2 - 1.2]  /  DBili  x   /  AST  23 [0 - 41]  /  ALT  21 [0 - 41]  /  AlkPhos  41 [30 - 115]  03-28    PT/INR - ( 27 Mar 2021 01:20 )   PT: ;   INR: 1.44 ratio         PTT - ( 27 Mar 2021 01:20 )  PTT:27.0 sec    ABG - ( 26 Mar 2021 12:43 )  pH: 7.40  /  pCO2: 39    /  pO2: 110   / HCO3: 24    / Base Excess: -0.5  /  SaO2: 98    /  LA: 2.0              RADIOLOGY & ADDITIONAL TESTS:  CXR:  EKG:  MEDICATIONS  (STANDING):  albuterol/ipratropium for Nebulization 3 milliLiter(s) Nebulizer every 6 hours  aspirin enteric coated 325 milliGRAM(s) Oral daily  atorvastatin 40 milliGRAM(s) Oral at bedtime  bisacodyl 5 milliGRAM(s) Oral every 12 hours  chlorhexidine 4% Liquid 1 Application(s) Topical <User Schedule>  clopidogrel Tablet 75 milliGRAM(s) Oral daily  dextrose 40% Gel 15 Gram(s) Oral once  dextrose 5%. 1000 milliLiter(s) (50 mL/Hr) IV Continuous <Continuous>  dextrose 5%. 1000 milliLiter(s) (100 mL/Hr) IV Continuous <Continuous>  dextrose 50% Injectable 50 milliLiter(s) IV Push every 15 minutes  dextrose 50% Injectable 25 milliLiter(s) IV Push every 15 minutes  enoxaparin Injectable 40 milliGRAM(s) SubCutaneous daily  famotidine    Tablet 20 milliGRAM(s) Oral two times a day  glucagon  Injectable 1 milliGRAM(s) IntraMuscular once  guaiFENesin  milliGRAM(s) Oral every 12 hours  insulin lispro (ADMELOG) corrective regimen sliding scale   SubCutaneous three times a day before meals  magnesium sulfate  IVPB 1 Gram(s) IV Intermittent every 12 hours  meperidine     Injectable 25 milliGRAM(s) IV Push once  metoprolol tartrate 12.5 milliGRAM(s) Oral two times a day  polyethylene glycol 3350 17 Gram(s) Oral daily  potassium chloride    Tablet ER 40 milliEquivalent(s) Oral once  senna 2 Tablet(s) Oral at bedtime    MEDICATIONS  (PRN):  acetaminophen   Tablet .. 650 milliGRAM(s) Oral every 6 hours PRN Temp greater or equal to 38C (100.4F), Mild Pain (1 - 3)  ondansetron  IVPB 4 milliGRAM(s) IV Intermittent once PRN Nausea and/or Vomiting  oxyCODONE    IR 5 milliGRAM(s) Oral every 4 hours PRN Moderate Pain (4 - 6)  oxyCODONE    IR 10 milliGRAM(s) Oral every 4 hours PRN Severe Pain (7 - 10)    HEPARIN:  [] YES [] NO  Dose: XX UNITS/HR UNITS Q8H  LOVENOX:[] YES [] NO  Dose: XX mg Q24H  COUMADIN: []  YES [] NO  Dose: XX mg  Q24H  SCD's: YES b/l  GI Prophylaxis: Protonix [], Pepcid [], None [], (Contra-indication:.....)    Post-Op Beta-Blockers: Yes [], No[], If No, then contraindication:  Post-Op Aspirin: Yes [],  No [], If No, then contraindication:  Post-Op Statin: Yes [], No[], If No, then contraindication:  Allergies    No Known Allergies    Intolerances      Ambulation/Activity Status:    Assessment/Plan:  60y Male status-post .....  - Case and plan discussed with CTU Intensivist and CT Surgeon - Dr. Scott/Kimberli/Manuel  - Continue CTU supportive care    - Continue DVT/GI prophylaxis  - Incentive Spirometry 10 times an hour  - Continue to advance physical activity as tolerated and continue PT/OT as directed  1. CAD: Continue ASA, statin, BB  2. HTN:   3. A. Fib:   4. COPD/Hypoxia:   5. DM/Glucose Control:     Social Service Disposition:     OPERATIVE PROCEDURE(s):                POD #    3 s/p CABG LIM[place] to LAD                   60yMale  SURGEON(s): LATOYA Ag  SUBJECTIVE ASSESSMENT: wants to go home  Vital Signs Last 24 Hrs  T(F): 98.3 (28 Mar 2021 04:00), Max: 98.9 (27 Mar 2021 12:00)  HR: 107 (28 Mar 2021 07:00) (67 - 107)  BP: 131/75 (28 Mar 2021 06:00) (99/58 - 156/72)  BP(mean): 99 (28 Mar 2021 06:00) (73 - 103)  RR: 39 (28 Mar 2021 07:00) (0 - 39)  SpO2: 96% (28 Mar 2021 07:00) (90% - 100%)  I&O's Detail    27 Mar 2021 07:01  -  28 Mar 2021 07:00  --------------------------------------------------------  IN:    IV PiggyBack: 100 mL    Oral Fluid: 472 mL  Total IN: 572 mL    OUT:    Chest Tube (mL): 160 mL    Indwelling Catheter - Urethral (mL): 735 mL    Voided (mL): 200 mL  Total OUT: 1095 mL    Net:   I&O's Detail    26 Mar 2021 07:01  -  27 Mar 2021 07:00  --------------------------------------------------------  Total NET: -265 mL      27 Mar 2021 07:01  -  28 Mar 2021 07:00  --------------------------------------------------------  Total NET: -523 mL      CAPILLARY BLOOD GLUCOSE      POCT Blood Glucose.: 115 mg/dL (28 Mar 2021 01:19)  POCT Blood Glucose.: 96 mg/dL (27 Mar 2021 16:31)    Physical Exam:  General: NAD; A&Ox3  Cardiac: S1/S2, RRR, no murmur, no rubs  Lungs: unlabored shallow respirations, bs decreased at bases  Abdomen: Soft/NT/ND; positive bowel sounds x 4  Sternum: Intact, no click, incision healing well with no drainage  Extremities: No edema b/l lower extremities    Central Venous Catheter: removed    BOWEL MOVEMENT:  yes  CHEST TUBE(Left/Right):  removed       LABS:                        7.9<L>  10.88<H> )-----------( 119<L>    ( 28 Mar 2021 01:40 )             23.2<L>                        8.5<L>  11.08<H> )-----------( 113<L>    ( 27 Mar 2021 01:20 )             25.7<L>    03-28    137  |  104  |  13  ----------------------------<  107<H>  3.6   |  25  |  0.6<L>  03-27    136  |  103  |  14  ----------------------------<  124<H>  3.8   |  23  |  0.7    Ca    8.4<L>      28 Mar 2021 01:40  Mg     2.3     03-28    TPro  5.5<L> [6.0 - 8.0]  /  Alb  3.8 [3.5 - 5.2]  /  TBili  0.8 [0.2 - 1.2]  /  DBili  x   /  AST  23 [0 - 41]  /  ALT  21 [0 - 41]  /  AlkPhos  41 [30 - 115]  03-28    PT/INR - ( 27 Mar 2021 01:20 )   PT: ;   INR: 1.44 ratio         PTT - ( 27 Mar 2021 01:20 )  PTT:27.0 sec    ABG - ( 26 Mar 2021 12:43 )  pH: 7.40  /  pCO2: 39    /  pO2: 110   / HCO3: 24    / Base Excess: -0.5  /  SaO2: 98    /  LA: 2.0        RADIOLOGY & ADDITIONAL TESTS:  CXR: < from: Xray Chest 1 View- PORTABLE-Routine (03.28.21 @ 06:00) >  Status post a median sternotomy and cardiomegaly.    Bilateral opacities, unchanged.    Previously reported left apical pneumothorax is not seen on this examination.    < end of copied text >  < from: 12 Lead ECG (03.28.21 @ 08:38) >    Ventricular Rate 89 BPM    Atrial Rate 89 BPM    P-R Interval 180 ms    QRS Duration 92 ms    Q-T Interval 378 ms    QTC Calculation(Bazett) 459 ms    P Axis 72 degrees    R Axis 34 degrees    T Axis 61 degrees    Diagnosis Line Normal sinus rhythm  Nonspecific T wave abnormality  Abnormal ECG    MEDICATIONS  (STANDING):  albuterol/ipratropium for Nebulization 3 milliLiter(s) Nebulizer every 6 hours  aspirin enteric coated 325 milliGRAM(s) Oral daily  atorvastatin 40 milliGRAM(s) Oral at bedtime  bisacodyl 5 milliGRAM(s) Oral every 12 hours  chlorhexidine 4% Liquid 1 Application(s) Topical <User Schedule>  clopidogrel Tablet 75 milliGRAM(s) Oral daily  dextrose 40% Gel 15 Gram(s) Oral once  dextrose 5%. 1000 milliLiter(s) (50 mL/Hr) IV Continuous <Continuous>  dextrose 5%. 1000 milliLiter(s) (100 mL/Hr) IV Continuous <Continuous>  dextrose 50% Injectable 50 milliLiter(s) IV Push every 15 minutes  dextrose 50% Injectable 25 milliLiter(s) IV Push every 15 minutes  enoxaparin Injectable 40 milliGRAM(s) SubCutaneous daily  famotidine    Tablet 20 milliGRAM(s) Oral two times a day  glucagon  Injectable 1 milliGRAM(s) IntraMuscular once  guaiFENesin  milliGRAM(s) Oral every 12 hours  insulin lispro (ADMELOG) corrective regimen sliding scale   SubCutaneous three times a day before meals  magnesium sulfate  IVPB 1 Gram(s) IV Intermittent every 12 hours  meperidine     Injectable 25 milliGRAM(s) IV Push once  metoprolol tartrate 12.5 milliGRAM(s) Oral two times a day  polyethylene glycol 3350 17 Gram(s) Oral daily  potassium chloride    Tablet ER 40 milliEquivalent(s) Oral once  senna 2 Tablet(s) Oral at bedtime    MEDICATIONS  (PRN):  acetaminophen   Tablet .. 650 milliGRAM(s) Oral every 6 hours PRN Temp greater or equal to 38C (100.4F), Mild Pain (1 - 3)  ondansetron  IVPB 4 milliGRAM(s) IV Intermittent once PRN Nausea and/or Vomiting  oxyCODONE    IR 5 milliGRAM(s) Oral every 4 hours PRN Moderate Pain (4 - 6)  oxyCODONE    IR 10 milliGRAM(s) Oral every 4 hours PRN Severe Pain (7 - 10)    Allergies    No Known Allergies    Intolerances      Allergies    No Known Allergies    Intolerances      Ambulation/Activity Status: ambulate as tolerated    Assessment/Plan:  60y Male status-post Emergent CABG1 [place]-LAD               POD #  3   - Case and plan discussed with CTU Intensivist and CT Surgeon - Dr. Ag  - Continue CTU supportive care and ongoing plan of care as per continuing CTU rounds.    - Continue DVT/GI prophylaxis  - Incentive Spirometry 10 times an hour  - Continue to advance physical activity as tolerated and continue PT/OT as directed  1. CAD s/p CABG: Continue ASA, statin, BB. start Plaviix   2. A. Fib ppx: cont Mag 1gm IV BID and BB.   3. COPD/Hypoxia: Duoneb and lasix  4. DM/Glucose Control: non-diabetic discontinue riss.  6. lasix for mild pulmonary congestion  7. eft apical phneumothorax resolved  8. will discharge home on lasix and K    d/c today

## 2021-03-28 NOTE — DISCHARGE NOTE NURSING/CASE MANAGEMENT/SOCIAL WORK - NSDCPEEMAIL_GEN_ALL_CORE
[facility] for Tobacco Control email [email or web address]

## 2021-03-28 NOTE — DISCHARGE NOTE NURSING/CASE MANAGEMENT/SOCIAL WORK - NSDCPEWEB_GEN_ALL_CORE
[facility] for Tobacco Control website --- [email or web address] --- [email or web address]

## 2021-03-28 NOTE — DISCHARGE NOTE NURSING/CASE MANAGEMENT/SOCIAL WORK - PATIENT PORTAL LINK FT
You can access the FollowMyHealth Patient Portal offered by [facility] by registering at the following website: [email or web address]. By joining [employer]’s FollowMyHealth portal, you will also be able to view your health information using other applications (apps) compatible with our system.

## 2021-03-29 ENCOUNTER — NON-APPOINTMENT (OUTPATIENT)
Age: 61
End: 2021-03-29

## 2021-03-29 RX ORDER — FAMOTIDINE 20 MG/1
20 TABLET, FILM COATED ORAL
Refills: 0 | Status: ACTIVE | COMMUNITY
Start: 2021-03-29

## 2021-03-29 RX ORDER — RAMIPRIL 5 MG/1
5 CAPSULE ORAL
Refills: 0 | Status: DISCONTINUED | COMMUNITY
Start: 2019-02-25 | End: 2021-03-29

## 2021-03-29 RX ORDER — METOPROLOL TARTRATE 25 MG/1
25 TABLET, FILM COATED ORAL DAILY
Refills: 0 | Status: ACTIVE | COMMUNITY
Start: 2021-03-29

## 2021-03-30 ENCOUNTER — APPOINTMENT (OUTPATIENT)
Dept: CARE COORDINATION | Facility: HOME HEALTH | Age: 61
End: 2021-03-30
Payer: COMMERCIAL

## 2021-03-30 PROCEDURE — 99024 POSTOP FOLLOW-UP VISIT: CPT

## 2021-03-31 ENCOUNTER — NON-APPOINTMENT (OUTPATIENT)
Age: 61
End: 2021-03-31

## 2021-03-31 ENCOUNTER — APPOINTMENT (OUTPATIENT)
Dept: CARDIOTHORACIC SURGERY | Facility: CLINIC | Age: 61
End: 2021-03-31
Payer: COMMERCIAL

## 2021-03-31 ENCOUNTER — OUTPATIENT (OUTPATIENT)
Dept: OUTPATIENT SERVICES | Facility: HOSPITAL | Age: 61
LOS: 1 days | Discharge: HOME | End: 2021-03-31
Payer: COMMERCIAL

## 2021-03-31 VITALS — OXYGEN SATURATION: 97 % | RESPIRATION RATE: 14 BRPM | HEART RATE: 88 BPM

## 2021-03-31 VITALS — SYSTOLIC BLOOD PRESSURE: 110 MMHG | DIASTOLIC BLOOD PRESSURE: 60 MMHG

## 2021-03-31 DIAGNOSIS — R06.02 SHORTNESS OF BREATH: ICD-10-CM

## 2021-03-31 DIAGNOSIS — Z98.890 OTHER SPECIFIED POSTPROCEDURAL STATES: Chronic | ICD-10-CM

## 2021-03-31 DIAGNOSIS — Z96.642 PRESENCE OF LEFT ARTIFICIAL HIP JOINT: Chronic | ICD-10-CM

## 2021-03-31 DIAGNOSIS — Z95.1 PRESENCE OF AORTOCORONARY BYPASS GRAFT: ICD-10-CM

## 2021-03-31 DIAGNOSIS — Z98.84 BARIATRIC SURGERY STATUS: Chronic | ICD-10-CM

## 2021-03-31 DIAGNOSIS — Z90.49 ACQUIRED ABSENCE OF OTHER SPECIFIED PARTS OF DIGESTIVE TRACT: Chronic | ICD-10-CM

## 2021-03-31 PROCEDURE — 99024 POSTOP FOLLOW-UP VISIT: CPT

## 2021-03-31 PROCEDURE — 71046 X-RAY EXAM CHEST 2 VIEWS: CPT | Mod: 26

## 2021-03-31 NOTE — HISTORY OF PRESENT ILLNESS
[FreeTextEntry1] : FOLLOW YOUR HEART\par \par TCM COVID-19 screening protocol reviewed with patient and/or caregiver and denies any signs and symptoms.  Patient and/or care giver denies any contact with a suspect or known COVID-19 case within the last 14 days.  Patient and/or caregiver have tested negative for COVID-19. Patient and/or caregiver does not live in an assisted living or skilled nursing facility.  Patient and/or care giver [place] last 14 days. \par \par Hospital Course	 \par 60y Male recently presented to hospital with SOB and high calcium score on CT \par for elective cath. Found to have  of LAD. He was discharged with plan for \par elective opening of LAD in 1 week. Day after cath presented to ED with SOB and \par dizziness which was felt to be do to HR < 50 2/2 BB. He was d/c after 24 hrs \par with no BB and plan for LAD opening in 1 week. 3/25 he presented for scheduled \par PCI of  of LAD. During procedure LAD was dissected with accumulating blood \par in pericardium appreciated. He was emergently seen by CTS and taken to OR for \par evacuation of pericardial effusion and bypass to LAD. His post op course was \par uneventful and he was discharged home in stable condition, with room air sat's \par of 90-91%.\par \par He is seen at home\par offers no questions or concerns\par Denies SOB, fever, CP or palpitations

## 2021-03-31 NOTE — PHYSICAL EXAM
[Clean] : clean [Dry] : dry [Healing Well] : healing well [No Edema] : no edema [Auscultation Breath Sounds / Voice Sounds] : lungs were clear to auscultation bilaterally [Respiration, Rhythm And Depth] : normal respiratory rhythm and effort [Apical Impulse] : the apical impulse was normal [2+] : left 2+ [Abdomen Soft] : soft [Abdomen Tenderness] : non-tender [Oriented To Time, Place, And Person] : oriented to person, place, and time

## 2021-03-31 NOTE — ASSESSMENT
[FreeTextEntry1] : He is progressing well\par Reports -2lbs from day prior\par uses CPAP HS\par compliant with all meds/post-op cares as per DC instructions\par medications reviewed\par Ambulating/Using IS\par [facility] services in place\par \par Education\par 1.  DC instructions reviewed with patient - discussed importance of medications (indication, schedule, side effects, and importance of compliance reviewed) and f/u appointments.\par 2.  Clean incision sites daily - shower indirectly, clean with soap and water, pat dry.  Avoid the use of lotions, ointments, powders, and perfumes on or around incision sites.\par 3.  Sternal precautions - avoid any heavy lifting (>5-10 lbs x 8 weeks), raising both arms above head simultaneously.\par 4.  Place TEDs on in AM prior to getting out of bed and off in PM when returning to bed.\par 5.  Follow a heart healthy diet - low salt, low fat.\par 6.  Exercise daily.\par 7.  Monitor and call [facility] NP for signs and symptoms of infection (redness, drainage, and fever).\par 8.  Monitor daily weights (call for a gain of 2-3 lbs/day or 5-6 lbs/week). \par 9.  Blood sugar control necessary for wound healing if applicable.\par 10.  Avoid straining during BM - use stool softners as needed. \par 11.  Instructed on importance of incentive spirometry use (10x/hour).\par \par Patient verbalized understanding of all education outlined above. Pt instructed to call [facility] NP for any issues as delineated above.\par \par PLAN\par 1.  Monitor daily weights\par 2.  Continue current medications as prescribed\par 3.  Incentive spirometry use\par 4.  Maintain sternal precautions\par 5.  Exercise daily\par 5.  Maintain HH diet\par 6.  Maintain TEDs\par 7.  Keep legs elevated\par 8.  F/U appointments - \par CTSx Kimberli 3/31\par Cardio Mustaciulo TBD\par PMD Duane TBD\par 9.  [facility] NP will continue to f/u with patient status - Pt agrees to call with any questions/concerns\par 10. To recover without complications.\par

## 2021-04-02 ENCOUNTER — APPOINTMENT (OUTPATIENT)
Dept: CARDIOTHORACIC SURGERY | Facility: CLINIC | Age: 61
End: 2021-04-02
Payer: COMMERCIAL

## 2021-04-02 VITALS
HEIGHT: 72 IN | RESPIRATION RATE: 14 BRPM | TEMPERATURE: 97.7 F | OXYGEN SATURATION: 98 % | SYSTOLIC BLOOD PRESSURE: 137 MMHG | BODY MASS INDEX: 31.42 KG/M2 | HEART RATE: 82 BPM | DIASTOLIC BLOOD PRESSURE: 68 MMHG | WEIGHT: 232 LBS

## 2021-04-02 LAB
ANION GAP SERPL CALC-SCNC: 17 MMOL/L
BASOPHILS # BLD AUTO: 0.07 K/UL
BASOPHILS NFR BLD AUTO: 0.5 %
BUN SERPL-MCNC: 18 MG/DL
CALCIUM SERPL-MCNC: 9.6 MG/DL
CHLORIDE SERPL-SCNC: 100 MMOL/L
CO2 SERPL-SCNC: 21 MMOL/L
CREAT SERPL-MCNC: 0.9 MG/DL
EOSINOPHIL # BLD AUTO: 0.52 K/UL
EOSINOPHIL NFR BLD AUTO: 3.4 %
GLUCOSE SERPL-MCNC: 96 MG/DL
HCT VFR BLD CALC: 29.6 %
HGB BLD-MCNC: 9.6 G/DL
IMM GRANULOCYTES NFR BLD AUTO: 1.1 %
LYMPHOCYTES # BLD AUTO: 2.8 K/UL
LYMPHOCYTES NFR BLD AUTO: 18.5 %
MAN DIFF?: NORMAL
MCHC RBC-ENTMCNC: 30.2 PG
MCHC RBC-ENTMCNC: 32.4 G/DL
MCV RBC AUTO: 93.1 FL
MONOCYTES # BLD AUTO: 1.36 K/UL
MONOCYTES NFR BLD AUTO: 9 %
NEUTROPHILS # BLD AUTO: 10.18 K/UL
NEUTROPHILS NFR BLD AUTO: 67.5 %
PLATELET # BLD AUTO: 245 K/UL
POTASSIUM SERPL-SCNC: 4.7 MMOL/L
RBC # BLD: 3.18 M/UL
RBC # FLD: 14.2 %
SODIUM SERPL-SCNC: 138 MMOL/L
WBC # FLD AUTO: 15.1 K/UL

## 2021-04-02 PROCEDURE — 99024 POSTOP FOLLOW-UP VISIT: CPT

## 2021-04-06 DIAGNOSIS — Z79.82 LONG TERM (CURRENT) USE OF ASPIRIN: ICD-10-CM
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
## 2021-04-06 DIAGNOSIS — I25.82 CHRONIC TOTAL OCCLUSION OF CORONARY ARTERY: ICD-10-CM

## 2021-04-06 DIAGNOSIS — Z98.84 BARIATRIC SURGERY STATUS: ICD-10-CM

## 2021-04-06 DIAGNOSIS — E87.70 FLUID OVERLOAD, UNSPECIFIED: ICD-10-CM

## 2021-04-06 DIAGNOSIS — E66.9 OBESITY, UNSPECIFIED: ICD-10-CM

## 2021-04-06 DIAGNOSIS — I25.42 CORONARY ARTERY DISSECTION: ICD-10-CM

## 2021-04-06 DIAGNOSIS — I31.2 HEMOPERICARDIUM, NOT ELSEWHERE CLASSIFIED: ICD-10-CM

## 2021-04-06 DIAGNOSIS — I25.118 ATHEROSCLEROTIC HEART DISEASE OF NATIVE CORONARY ARTERY WITH OTHER FORMS OF ANGINA PECTORIS: ICD-10-CM

## 2021-04-06 DIAGNOSIS — D62 ACUTE POSTHEMORRHAGIC ANEMIA: ICD-10-CM

## 2021-04-06 DIAGNOSIS — J93.9 PNEUMOTHORAX, UNSPECIFIED: ICD-10-CM

## 2021-04-06 DIAGNOSIS — I24.0 ACUTE CORONARY THROMBOSIS NOT RESULTING IN MYOCARDIAL INFARCTION: ICD-10-CM
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
## 2021-04-06 DIAGNOSIS — Z96.642 PRESENCE OF LEFT ARTIFICIAL HIP JOINT: ICD-10-CM

## 2021-04-06 DIAGNOSIS — F17.210 NICOTINE DEPENDENCE, CIGARETTES, UNCOMPLICATED: ICD-10-CM

## 2021-04-06 DIAGNOSIS — G47.33 OBSTRUCTIVE SLEEP APNEA (ADULT) (PEDIATRIC): ICD-10-CM

## 2021-04-06 DIAGNOSIS — R09.02 HYPOXEMIA: ICD-10-CM

## 2021-04-06 DIAGNOSIS — I48.91 UNSPECIFIED ATRIAL FIBRILLATION: ICD-10-CM

## 2021-04-06 DIAGNOSIS — I31.4 CARDIAC TAMPONADE: ICD-10-CM

## 2021-04-06 DIAGNOSIS — I10 ESSENTIAL (PRIMARY) HYPERTENSION: ICD-10-CM

## 2021-04-06 DIAGNOSIS — I31.3 PERICARDIAL EFFUSION (NONINFLAMMATORY): ICD-10-CM

## 2021-04-06 DIAGNOSIS — D69.6 THROMBOCYTOPENIA, UNSPECIFIED: ICD-10-CM

## 2021-04-06 DIAGNOSIS — I97.51 ACCIDENTAL PUNCTURE AND LACERATION OF A CIRCULATORY SYSTEM ORGAN OR STRUCTURE DURING A CIRCULATORY SYSTEM PROCEDURE: ICD-10-CM

## 2021-04-07 ENCOUNTER — APPOINTMENT (OUTPATIENT)
Dept: CARDIOTHORACIC SURGERY | Facility: CLINIC | Age: 61
End: 2021-04-07
Payer: COMMERCIAL

## 2021-04-07 ENCOUNTER — OUTPATIENT (OUTPATIENT)
Dept: OUTPATIENT SERVICES | Facility: HOSPITAL | Age: 61
LOS: 1 days | Discharge: HOME | End: 2021-04-07
Payer: COMMERCIAL

## 2021-04-07 VITALS
DIASTOLIC BLOOD PRESSURE: 71 MMHG | HEART RATE: 80 BPM | OXYGEN SATURATION: 97 % | SYSTOLIC BLOOD PRESSURE: 122 MMHG | WEIGHT: 226 LBS | RESPIRATION RATE: 14 BRPM | BODY MASS INDEX: 30.61 KG/M2 | TEMPERATURE: 98.2 F | HEIGHT: 72 IN

## 2021-04-07 DIAGNOSIS — Z90.49 ACQUIRED ABSENCE OF OTHER SPECIFIED PARTS OF DIGESTIVE TRACT: Chronic | ICD-10-CM

## 2021-04-07 DIAGNOSIS — Z96.642 PRESENCE OF LEFT ARTIFICIAL HIP JOINT: Chronic | ICD-10-CM

## 2021-04-07 DIAGNOSIS — Z98.84 BARIATRIC SURGERY STATUS: Chronic | ICD-10-CM

## 2021-04-07 DIAGNOSIS — Z95.1 PRESENCE OF AORTOCORONARY BYPASS GRAFT: ICD-10-CM

## 2021-04-07 DIAGNOSIS — Z98.890 OTHER SPECIFIED POSTPROCEDURAL STATES: Chronic | ICD-10-CM

## 2021-04-07 LAB
ANION GAP SERPL CALC-SCNC: 16 MMOL/L
BASOPHILS # BLD AUTO: 0.09 K/UL
BASOPHILS NFR BLD AUTO: 0.7 %
BUN SERPL-MCNC: 17 MG/DL
CALCIUM SERPL-MCNC: 9.4 MG/DL
CHLORIDE SERPL-SCNC: 97 MMOL/L
CO2 SERPL-SCNC: 22 MMOL/L
CREAT SERPL-MCNC: 1 MG/DL
EOSINOPHIL # BLD AUTO: 0.58 K/UL
EOSINOPHIL NFR BLD AUTO: 4.6 %
GLUCOSE SERPL-MCNC: 105 MG/DL
HCT VFR BLD CALC: 32.7 %
HGB BLD-MCNC: 10.5 G/DL
IMM GRANULOCYTES NFR BLD AUTO: 0.7 %
LYMPHOCYTES # BLD AUTO: 1.93 K/UL
LYMPHOCYTES NFR BLD AUTO: 15.3 %
MAN DIFF?: NORMAL
MCHC RBC-ENTMCNC: 29.2 PG
MCHC RBC-ENTMCNC: 32.1 G/DL
MCV RBC AUTO: 90.8 FL
MONOCYTES # BLD AUTO: 0.91 K/UL
MONOCYTES NFR BLD AUTO: 7.2 %
NEUTROPHILS # BLD AUTO: 9 K/UL
NEUTROPHILS NFR BLD AUTO: 71.5 %
PLATELET # BLD AUTO: 379 K/UL
POTASSIUM SERPL-SCNC: 5.3 MMOL/L
RBC # BLD: 3.6 M/UL
RBC # FLD: 13.8 %
SODIUM SERPL-SCNC: 135 MMOL/L
WBC # FLD AUTO: 12.6 K/UL

## 2021-04-07 PROCEDURE — 99024 POSTOP FOLLOW-UP VISIT: CPT

## 2021-04-07 PROCEDURE — 71046 X-RAY EXAM CHEST 2 VIEWS: CPT | Mod: 26

## 2021-04-08 RX ORDER — POLYETHYLENE GLYCOL 3350 17 G/17G
17 POWDER, FOR SOLUTION ORAL
Refills: 0 | Status: DISCONTINUED | COMMUNITY
Start: 2021-03-29 | End: 2021-04-08

## 2021-04-08 RX ORDER — OXYCODONE 5 MG/1
5 TABLET ORAL
Refills: 0 | Status: DISCONTINUED | COMMUNITY
Start: 2021-03-29 | End: 2021-04-08

## 2021-04-08 RX ORDER — SENNOSIDES 8.6 MG/1
8.6 TABLET ORAL
Refills: 0 | Status: DISCONTINUED | COMMUNITY
Start: 2021-03-29 | End: 2021-04-08

## 2021-04-08 RX ORDER — ZOLPIDEM TARTRATE 10 MG/1
10 TABLET ORAL
Refills: 0 | Status: DISCONTINUED | COMMUNITY
End: 2021-04-08

## 2021-04-20 PROBLEM — Z95.1 S/P CABG (CORONARY ARTERY BYPASS GRAFT): Status: ACTIVE | Noted: 2021-03-30

## 2021-04-21 ENCOUNTER — APPOINTMENT (OUTPATIENT)
Dept: CARDIOTHORACIC SURGERY | Facility: CLINIC | Age: 61
End: 2021-04-21
Payer: COMMERCIAL

## 2021-04-21 VITALS
HEART RATE: 84 BPM | BODY MASS INDEX: 30.48 KG/M2 | DIASTOLIC BLOOD PRESSURE: 74 MMHG | TEMPERATURE: 98.7 F | OXYGEN SATURATION: 96 % | WEIGHT: 225 LBS | SYSTOLIC BLOOD PRESSURE: 124 MMHG | HEIGHT: 72 IN | RESPIRATION RATE: 12 BRPM

## 2021-04-21 DIAGNOSIS — Z95.1 PRESENCE OF AORTOCORONARY BYPASS GRAFT: ICD-10-CM

## 2021-04-21 PROCEDURE — 99024 POSTOP FOLLOW-UP VISIT: CPT

## 2021-04-21 RX ORDER — ALBUTEROL SULFATE 90 UG/1
108 (90 BASE) POWDER, METERED RESPIRATORY (INHALATION)
Refills: 0 | Status: DISCONTINUED | COMMUNITY
End: 2021-04-21

## 2021-04-21 RX ORDER — FUROSEMIDE 20 MG/1
20 TABLET ORAL DAILY
Qty: 5 | Refills: 0 | Status: DISCONTINUED | COMMUNITY
Start: 2021-03-29 | End: 2021-04-21

## 2021-04-21 RX ORDER — ASPIRIN 325 MG/1
325 TABLET, FILM COATED ORAL DAILY
Qty: 30 | Refills: 0 | Status: ACTIVE | COMMUNITY
Start: 2021-03-29 | End: 1900-01-01

## 2021-04-21 RX ORDER — FLUTICASONE PROPIONATE AND SALMETEROL XINAFOATE 115; 21 UG/1; UG/1
115-21 AEROSOL, METERED RESPIRATORY (INHALATION) TWICE DAILY
Refills: 0 | Status: DISCONTINUED | COMMUNITY
End: 2021-04-21

## 2021-04-21 RX ORDER — POTASSIUM CHLORIDE 750 MG/1
10 TABLET, FILM COATED, EXTENDED RELEASE ORAL DAILY
Qty: 5 | Refills: 0 | Status: DISCONTINUED | COMMUNITY
Start: 2021-03-29 | End: 2021-04-21

## 2021-04-21 RX ORDER — ATORVASTATIN CALCIUM 40 MG/1
40 TABLET, FILM COATED ORAL
Qty: 30 | Refills: 0 | Status: ACTIVE | COMMUNITY
Start: 2021-03-29 | End: 1900-01-01

## 2021-04-27 ENCOUNTER — TRANSCRIPTION ENCOUNTER (OUTPATIENT)
Age: 61
End: 2021-04-27

## 2021-10-15 NOTE — H&P CARDIOLOGY - BP NONINVASIVE MEAN (MM HG)
85 Ftsg Text: The defect edges were debeveled with a #15 scalpel blade.  Given the location of the defect, shape of the defect and the proximity to free margins a full thickness skin graft was deemed most appropriate.  Using a sterile surgical marker, the primary defect shape was transferred to the donor site. The area thus outlined was incised deep to adipose tissue with a #15 scalpel blade.  The harvested graft was then trimmed of adipose tissue until only dermis and epidermis was left.  The skin margins of the secondary defect were undermined to an appropriate distance in all directions utilizing iris scissors.  The secondary defect was closed with interrupted buried subcutaneous sutures.  The skin edges were then re-apposed with running  sutures.  The skin graft was then placed in the primary defect and oriented appropriately.

## 2021-12-06 NOTE — PRE-ANESTHESIA EVALUATION ADULT - HEIGHT IN CM
182.88 Metronidazole Counseling:  I discussed with the patient the risks of metronidazole including but not limited to seizures, nausea/vomiting, a metallic taste in the mouth, nausea/vomiting and severe allergy.

## 2022-04-21 NOTE — ED PROVIDER NOTE - NS ED MD DISPO SPECIAL CONSIDERATION1

## 2022-05-19 NOTE — ED ADULT TRIAGE NOTE - STATUS:
Subjective:       Patient ID: Yuliana Mattson is a 72 y.o. female.    Chief Complaint: Follow-up    Here to f/u on persistent cough.    Cough is improved.  Using flovent BID, Singulair for the last 2 weeks. No wheezing since then. She feels this is helping.  Getting some coughing spells. This is worse at night and with activity.  Using albuterol every other day.  still with some SOB with activity.  Taking Protonix 40mg daily.  Some clear sputum in the AM.  She has had EGD and CT Chest.  Using tylenol PM.    No further symptoms of pneumonia. No green sputum production or fever.    Completed Macrobid for UTI.  Completed Keflex for cellulitis.              Past Medical History:   Diagnosis Date    Allergic rhinitis     Anticoagulant long-term use     ASPIRIN ONLY - (stops it when she presents a hemorrhagic cystitis)    Bladder cancer     Blood transfusion     Breast cancer     CAD (coronary artery disease), native coronary artery     40% non obstructive    Cholelithiasis     Endometriosis     Headache(784.0)     HEARING LOSS     Hemorrhoids     HLD (hyperlipidemia)     Hypertension     Iritis     Left wrist fracture 2009    traumatic    Malignant neoplasm of other specified sites of bladder 12/3/2009    Osteoporosis, postmenopausal     PONV (postoperative nausea and vomiting)     Rash     Rosacea     UTI (urinary tract infection)     Vaginal delivery     x 2       Past Surgical History:   Procedure Laterality Date    APPENDECTOMY      BLADDER TUMOR EXCISION  1979    [facility], dr garcia - no recurrences since    BREAST BIOPSY Right     4 core bx negative    BREAST SURGERY Left 1998    ESOPHAGOGASTRODUODENOSCOPY N/A 4/21/2022    Procedure: ESOPHAGOGASTRODUODENOSCOPY (EGD);  Surgeon: Guru Maddox MD;  Location: [facility];  Service: Endoscopy;  Laterality: N/A;    HYSTERECTOMY      MASTECTOMY, PARTIAL  1995    left side - cancer - had radiotherapy 1995, 1998 had chemotherapy     oophrect      pelvic mass      benign    TONSILLECTOMY      TONSILLECTOMY, ADENOIDECTOMY, BILATERAL MYRINGOTOMY AND TUBES      tram flap Left 1998       Review of patient's allergies indicates:   Allergen Reactions    Prochlorperazine edisylate Anaphylaxis     compazine       Social History     Socioeconomic History    Marital status:    Occupational History    Occupation: retired accounting   Tobacco Use    Smoking status: Never Smoker    Smokeless tobacco: Never Used   Substance and Sexual Activity    Alcohol use: No    Drug use: No     Social Determinants of Health     Financial Resource Strain: Low Risk     Difficulty of Paying Living Expenses: Not hard at all   Food Insecurity: No Food Insecurity    Worried About Running Out of Food in the Last Year: Never true    Ran Out of Food in the Last Year: Never true   Transportation Needs: No Transportation Needs    Lack of Transportation (Medical): No    Lack of Transportation (Non-Medical): No   Physical Activity: Inactive    Days of Exercise per Week: 0 days    Minutes of Exercise per Session: 20 min   Stress: Stress Concern Present    Feeling of Stress : To some extent   Social Connections: Unknown    Frequency of Communication with Friends and Family: More than three times a week    Frequency of Social Gatherings with Friends and Family: Three times a week    Active Member of Clubs or Organizations: No    Attends Club or Organization Meetings: 1 to 4 times per year    Marital Status:    Housing Stability: Low Risk     Unable to Pay for Housing in the Last Year: No    Number of Places Lived in the Last Year: 1    Unstable Housing in the Last Year: No       Current Outpatient Medications on File Prior to Visit   Medication Sig Dispense Refill    apremilast (OTEZLA) 30 mg Tab Take 1 tablet (30 mg total) by mouth 2 (two) times daily. 60 tablet 12    fluocinolone acetonide oiL 0.01 % Drop Place 1 drop in ear(s) daily as  needed (itching). 20 mL 1    fluticasone propionate (FLOVENT HFA) 110 mcg/actuation inhaler Inhale 1 puff into the lungs 2 (two) times daily. Controller 12 g 3    hydroCHLOROthiazide (HYDRODIURIL) 12.5 MG Tab Take 1 tablet (12.5 mg total) by mouth once daily. 30 tablet 4    losartan (COZAAR) 50 MG tablet Take 1 tablet (50 mg total) by mouth once daily. 90 tablet 3    montelukast (SINGULAIR) 10 mg tablet Take 1 tablet (10 mg total) by mouth every evening. 30 tablet 5    pantoprazole (PROTONIX) 40 MG tablet Take 1 tablet (40 mg total) by mouth once daily. 30 tablet 11    RESTASIS 0.05 % ophthalmic emulsion INT 1 GTT IN OU BID      tretinoin (RETIN-A) 0.025 % cream Apply a pea-sized amount to entire face at bedtime, start every other night and increase as tolerated. Take night off if irritation develops. 45 g 3    vitamin D (VITAMIN D3) 1000 units Tab Take 1,000 Units by mouth once daily.      [DISCONTINUED] albuterol (PROVENTIL/VENTOLIN HFA) 90 mcg/actuation inhaler INHALE 2 PUFFS INTO THE LUNGS EVERY 6 HOURS AS NEEDED FOR WHEEZING OR SHORTNESS OF BREATH 54 g 0    ALPRAZolam (XANAX) 0.25 MG tablet Take 1 tablet (0.25 mg total) by mouth 3 (three) times daily as needed for Anxiety. (Patient not taking: Reported on 5/19/2022) 45 tablet 0    benzonatate (TESSALON) 200 MG capsule Take 1 capsule (200 mg total) by mouth 2 (two) times daily as needed for Cough. (Patient not taking: Reported on 5/19/2022) 60 capsule 4    nitrofurantoin, macrocrystal-monohydrate, (MACROBID) 100 MG capsule Take 1 capsule (100 mg total) by mouth 2 (two) times daily. for 5 days (Patient not taking: Reported on 5/19/2022) 10 capsule 0     No current facility-administered medications on file prior to visit.       Family History   Problem Relation Age of Onset    Glaucoma Father     Glaucoma Paternal Aunt     Glaucoma Paternal Grandmother     Cancer Cousin         1st, ovarian    Amblyopia Neg Hx     Blindness Neg Hx     Cataracts  "Neg Hx     Hypertension Neg Hx     Macular degeneration Neg Hx     Retinal detachment Neg Hx     Strabismus Neg Hx     Stroke Neg Hx     Thyroid disease Neg Hx     Melanoma Neg Hx        Review of Systems   Constitutional: Negative for appetite change, chills, fever and unexpected weight change.   HENT: Negative for sore throat and trouble swallowing.    Eyes: Negative for pain and visual disturbance.   Respiratory: Positive for cough. Negative for shortness of breath and wheezing.    Cardiovascular: Negative for chest pain and palpitations.   Gastrointestinal: Negative for abdominal pain, blood in stool, diarrhea, nausea and vomiting.   Genitourinary: Negative for difficulty urinating, dysuria and hematuria.   Musculoskeletal: Negative for arthralgias, gait problem and neck pain.   Skin: Negative for rash and wound.   Neurological: Negative for dizziness, weakness, numbness and headaches.   Hematological: Negative for adenopathy.   Psychiatric/Behavioral: Negative for dysphoric mood.       Objective:      /78   Pulse 93   Temp 98.6 °F (37 °C) (Oral)   Ht 5' 4" (1.626 m)   Wt 81.4 kg (179 lb 7.3 oz)   SpO2 97%   BMI 30.80 kg/m²   Physical Exam  Constitutional:       Appearance: Normal appearance. She is well-developed.   HENT:      Head: Normocephalic.      Mouth/Throat:      Pharynx: No oropharyngeal exudate or posterior oropharyngeal erythema.   Eyes:      Conjunctiva/sclera: Conjunctivae normal.      Pupils: Pupils are equal, round, and reactive to light.   Neck:      Thyroid: No thyromegaly.   Cardiovascular:      Rate and Rhythm: Normal rate and regular rhythm.      Heart sounds: Normal heart sounds, S1 normal and S2 normal. No murmur heard.    No friction rub. No gallop.   Pulmonary:      Effort: Pulmonary effort is normal.      Breath sounds: Normal breath sounds. No rales.   Musculoskeletal:      Cervical back: Normal range of motion and neck supple.      Right lower leg: No edema.      " Left lower leg: No edema.   Lymphadenopathy:      Cervical: No cervical adenopathy.   Skin:     General: Skin is warm.      Findings: No rash.   Neurological:      Mental Status: She is alert and oriented to person, place, and time.      Cranial Nerves: No cranial nerve deficit.      Gait: Gait normal.         Assessment:       1. Moderate persistent reactive airway disease without complication        Plan:       Moderate persistent reactive airway disease without complication  -     Ambulatory referral/consult to Pulmonology; Future; Expected date: 05/26/2022        Suspect reactive airway disease from likely combination of URI, pneumonia, GERD  Continue Protonix  Continue flovent  Begin albuterol QHS and prior to any planned exertion  Will refer to pulmonologist for PFTs and possible bronchoscopy  Counseled on regular exercise, maintenance of a healthy weight, balanced diet rich in fruits/vegetables and lean protein, and avoidance of unhealthy habits like smoking and excessive alcohol intake.     Intact

## 2022-10-29 ENCOUNTER — EMERGENCY (EMERGENCY)
Facility: HOSPITAL | Age: 62
LOS: 0 days | Discharge: LEFT BEFORE DISCHARGE | End: 2022-10-30
Attending: EMERGENCY MEDICINE

## 2022-10-29 VITALS
WEIGHT: 244.93 LBS | TEMPERATURE: 97 F | SYSTOLIC BLOOD PRESSURE: 173 MMHG | HEART RATE: 69 BPM | RESPIRATION RATE: 16 BRPM | DIASTOLIC BLOOD PRESSURE: 81 MMHG | OXYGEN SATURATION: 99 % | HEIGHT: 72 IN

## 2022-10-29 DIAGNOSIS — R07.89 OTHER CHEST PAIN: ICD-10-CM

## 2022-10-29 DIAGNOSIS — Z79.82 LONG TERM (CURRENT) USE OF ASPIRIN: ICD-10-CM

## 2022-10-29 DIAGNOSIS — Z98.890 OTHER SPECIFIED POSTPROCEDURAL STATES: Chronic | ICD-10-CM

## 2022-10-29 DIAGNOSIS — E78.5 HYPERLIPIDEMIA, UNSPECIFIED: ICD-10-CM

## 2022-10-29 DIAGNOSIS — Z95.5 PRESENCE OF CORONARY ANGIOPLASTY IMPLANT AND GRAFT: ICD-10-CM

## 2022-10-29 DIAGNOSIS — Z96.642 PRESENCE OF LEFT ARTIFICIAL HIP JOINT: Chronic | ICD-10-CM

## 2022-10-29 DIAGNOSIS — Z90.49 ACQUIRED ABSENCE OF OTHER SPECIFIED PARTS OF DIGESTIVE TRACT: Chronic | ICD-10-CM

## 2022-10-29 DIAGNOSIS — R07.9 CHEST PAIN, UNSPECIFIED: ICD-10-CM

## 2022-10-29 DIAGNOSIS — I10 ESSENTIAL (PRIMARY) HYPERTENSION: ICD-10-CM

## 2022-10-29 DIAGNOSIS — E11.9 TYPE 2 DIABETES MELLITUS WITHOUT COMPLICATIONS: ICD-10-CM

## 2022-10-29 DIAGNOSIS — Z98.84 BARIATRIC SURGERY STATUS: Chronic | ICD-10-CM

## 2022-10-29 DIAGNOSIS — Z79.02 LONG TERM (CURRENT) USE OF ANTITHROMBOTICS/ANTIPLATELETS: ICD-10-CM

## 2022-10-29 DIAGNOSIS — J44.9 CHRONIC OBSTRUCTIVE PULMONARY DISEASE, UNSPECIFIED: ICD-10-CM

## 2022-10-29 DIAGNOSIS — Z53.29 PROCEDURE AND TREATMENT NOT CARRIED OUT BECAUSE OF PATIENT'S DECISION FOR OTHER REASONS: ICD-10-CM

## 2022-10-29 DIAGNOSIS — I25.10 ATHEROSCLEROTIC HEART DISEASE OF NATIVE CORONARY ARTERY WITHOUT ANGINA PECTORIS: ICD-10-CM

## 2022-10-29 DIAGNOSIS — F17.200 NICOTINE DEPENDENCE, UNSPECIFIED, UNCOMPLICATED: ICD-10-CM

## 2022-10-29 DIAGNOSIS — R09.89 OTHER SPECIFIED SYMPTOMS AND SIGNS INVOLVING THE CIRCULATORY AND RESPIRATORY SYSTEMS: ICD-10-CM

## 2022-10-29 LAB
BASOPHILS # BLD AUTO: 0.06 K/UL — SIGNIFICANT CHANGE UP (ref 0–0.2)
BASOPHILS NFR BLD AUTO: 0.6 % — SIGNIFICANT CHANGE UP (ref 0–1)
EOSINOPHIL # BLD AUTO: 0.37 K/UL — SIGNIFICANT CHANGE UP (ref 0–0.7)
EOSINOPHIL NFR BLD AUTO: 3.4 % — SIGNIFICANT CHANGE UP (ref 0–8)
HCT VFR BLD CALC: 44.5 % — SIGNIFICANT CHANGE UP (ref 42–52)
HGB BLD-MCNC: 15 G/DL — SIGNIFICANT CHANGE UP (ref 14–18)
IMM GRANULOCYTES NFR BLD AUTO: 0.4 % — HIGH (ref 0.1–0.3)
LYMPHOCYTES # BLD AUTO: 3.6 K/UL — HIGH (ref 1.2–3.4)
LYMPHOCYTES # BLD AUTO: 33.5 % — SIGNIFICANT CHANGE UP (ref 20.5–51.1)
MCHC RBC-ENTMCNC: 29.2 PG — SIGNIFICANT CHANGE UP (ref 27–31)
MCHC RBC-ENTMCNC: 33.7 G/DL — SIGNIFICANT CHANGE UP (ref 32–37)
MCV RBC AUTO: 86.6 FL — SIGNIFICANT CHANGE UP (ref 80–94)
MONOCYTES # BLD AUTO: 0.69 K/UL — HIGH (ref 0.1–0.6)
MONOCYTES NFR BLD AUTO: 6.4 % — SIGNIFICANT CHANGE UP (ref 1.7–9.3)
NEUTROPHILS # BLD AUTO: 5.99 K/UL — SIGNIFICANT CHANGE UP (ref 1.4–6.5)
NEUTROPHILS NFR BLD AUTO: 55.7 % — SIGNIFICANT CHANGE UP (ref 42.2–75.2)
NRBC # BLD: 0 /100 WBCS — SIGNIFICANT CHANGE UP (ref 0–0)
PLATELET # BLD AUTO: 191 K/UL — SIGNIFICANT CHANGE UP (ref 130–400)
RBC # BLD: 5.14 M/UL — SIGNIFICANT CHANGE UP (ref 4.7–6.1)
RBC # FLD: 14.3 % — SIGNIFICANT CHANGE UP (ref 11.5–14.5)
WBC # BLD: 10.75 K/UL — SIGNIFICANT CHANGE UP (ref 4.8–10.8)
WBC # FLD AUTO: 10.75 K/UL — SIGNIFICANT CHANGE UP (ref 4.8–10.8)

## 2022-10-29 PROCEDURE — 93010 ELECTROCARDIOGRAM REPORT: CPT

## 2022-10-29 PROCEDURE — 99285 EMERGENCY DEPT VISIT HI MDM: CPT

## 2022-10-29 PROCEDURE — 71045 X-RAY EXAM CHEST 1 VIEW: CPT | Mod: 26

## 2022-10-29 PROCEDURE — 93010 ELECTROCARDIOGRAM REPORT: CPT | Mod: 77

## 2022-10-29 PROCEDURE — 93970 EXTREMITY STUDY: CPT | Mod: 26

## 2022-10-29 NOTE — ED PROVIDER NOTE - PROGRESS NOTE DETAILS
patient wishes to leave AMA. Patient seen at bedside to further discuss plan of care. Discussed risks of leaving against medical advice, which includes worsening of current medical condition, up to and including death. Patient understands risks and still wishes to leave. AMA paperwork signed and placed in chart. Dr. Diego made aware.     Will continue to follow, RN to call if any changes.

## 2022-10-29 NOTE — ED PROVIDER NOTE - CARE PROVIDER_API CALL
Aj Olmedo  CARDIOVASCULAR DISEASE  [address]  [place] [address]  Phone: [phone]  Fax: [phone]  Follow Up Time:

## 2022-10-29 NOTE — ED PROVIDER NOTE - OBJECTIVE STATEMENT
62-year-old male with past medical history of hypertension, CAD, status post bypass, hyperlipidemia, diabetes, COPD not on home O2 complaining of chest pain and dyspnea on exertion x several months. Pt follows with card Dr. Monique. Patient states was getting left-sided chest discomfort and checked blood pressure at home was noted to be over 200 so came to ER.  Denies any active chest pain now.  No associated recent illness, fever, chills, cough, urinary symptoms, nausea, vomiting, abdominal pain, diaphoresis, recent travel, history of PE DVT.  Positive tobacco use.

## 2022-10-29 NOTE — ED PROVIDER NOTE - PATIENT PORTAL LINK FT
You can access the FollowMyHealth Patient Portal offered by [facility] by registering at the following website: [email or web address]. By joining [employer]’s FollowMyHealth portal, you will also be able to view your health information using other applications (apps) compatible with our system.

## 2022-10-29 NOTE — ED PROVIDER NOTE - NS ED ROS FT
Constitutional: no fever, chills, no recent weight loss, change in appetite or malaise  Eyes: no redness/discharge/pain/vision changes  ENT: no rhinorrhea/ear pain/sore throat  Cardiac:  + L sided chest discomfort. No edema.  Respiratory: + POSADA. No cough or respiratory distress  GI: No nausea, vomiting, diarrhea or abdominal pain.  : No dysuria, frequency, urgency or hematuria  MS: no pain to back or extremities, no loss of ROM, no weakness  Neuro: No headache or weakness. No LOC.  Skin: No skin rash.  Endocrine: + diabetes.  Except as documented in the HPI, all other systems are negative.

## 2022-10-29 NOTE — ED PROVIDER NOTE - HIV OFFER
Iveth Varner is here for the most prominent right knee but other joints last seen March 16, 2018 at that time was having a headache left side March 2, 2018 ESR was 24  March 13, 2018 left temporal artery biopsy no arteritis  July 21, 2020 she went to the emergency room she slipped on water hit her head night prior 7 pain the left side of her body left ribs left shoulder lower back  Marielena  was the  today  Multiple x-rays were done    X-Ray Hip Left without or with Pelvis 2-3 Views7/21/2020  [facility]  Result Impression   Impression:   No left hip fractures or dislocations seen radiographically    Electronically Signed By: Baljit Cueto MD on 7/21/2020 1:00 PM CDT   Result Narrative   Bone Radiographs:     Findings:   No fracture or dislocation. Joint spaces are maintained. Soft tissues are unremarkable. Limited evaluation of the pelvis is grossly unremarkable. The right femoral head/neck is suboptimally evaluated due to positioning.   Other Result Information   Epic, Interface Incoming Radiology Results - 07/21/2020  1:02 PM CDT  Bone Radiographs:     Findings:   No fracture or dislocation. Joint spaces are maintained. Soft tissues are unremarkable. Limited evaluation of the pelvis is grossly unremarkable. The right femoral head/neck is suboptimally evaluated due to positioning.    IMPRESSION:  Impression:   No left hip fractures or dislocations seen radiographically    Electronically Signed By: Baljit Cueto MD on 7/21/2020 1:00 PM CDT   Status Results Details        X-Ray Lumbar Spine 2 Views7/21/2020  [facility]  Result Impression   Impression: No definite fractures identified radiographically.    Electronically Signed By: Baljit Cueto MD on 7/21/2020 12:59 PM CDT   Result Narrative   Findings: Grade 1 anterolisthesis of L4 on L5. Degenerative changes with facet hypertrophy. No definite fractures or subluxations. Vertebral body heights are  maintained. Mild neuroforaminal narrowing at L5-S1 related to degenerative change. Disc space narrowing at multiple levels. Nonobstructive bowel gas pattern.   Other Result Information   Epic, Interface Incoming Radiology Results - 07/21/2020  1:01 PM CDT  Findings: Grade 1 anterolisthesis of L4 on L5. Degenerative changes with facet hypertrophy. No definite fractures or subluxations. Vertebral body heights are maintained. Mild neuroforaminal narrowing at L5-S1 related to degenerative change. Disc space narrowing at multiple levels. Nonobstructive bowel gas pattern.    IMPRESSION:  Impression: No definite fractures identified radiographically.    Electronically Signed By: Baljit Cueto MD on 7/21/2020 12:59 PM CDT   Status     X-Ray Shoulder Left 2 or more views7/21/2020  [facility]  Result Impression   Impression:   No fractures or dislocations seen radiographically    Electronically Signed By: Baljit Cueto MD on 7/21/2020 12:56 PM CDT   Result Narrative   Bone Radiographs:     Findings:   Osteopenia. No definite fractures or dislocations. Degenerative changes of the glenohumeral and AC joints. Small well-corticated ossific density seen at the AC joint may represent a fractured osteophyte or accessory ossicle. Visualized lung is clear.   Other Result Information   Epic, Interface Incoming Radiology Results - 07/21/2020 12:58 PM CDT  Bone Radiographs:     Findings:   Osteopenia. No definite fractures or dislocations. Degenerative changes of the glenohumeral and AC joints. Small well-corticated ossific density seen at the AC joint may represent a fractured osteophyte or accessory ossicle. Visualized lung is clear.    IMPRESSION:  Impression:   No fractures or dislocations seen radiographically    Electronically Signed By: Baljit Cueto MD on 7/21/2020 12:56 PM CDT   Status Results Details            CT Cervical Spine without IV Contrast7/21/2020  [facility]  Result Impression    Impression:   1.  No definite cervical spine fractures identified.  2.  5 mm right apical lung nodule for which 6-12 month follow-up is recommended based on Fleischner criteria.    Electronically Signed By: Baljit Cueto MD on 7/21/2020 12:07 PM CDT   Result Narrative   CT Cervical Spine    Technique: Multiple helical transaxial images were obtained through the cervical spine. Sagittal and coronal reformats were constructed. Automatic exposure control was used for this study.    Findings:    Degenerative grade 1 anterolisthesis of C4 on C5. Vertebral body height is maintained. Degenerative changes of the spine . No fracture or subluxation. No evidence of high-grade central spinal or neuroforaminal stenosis.    Soft tissues of the neck are unremarkable. Small right thyroid nodule.    5 mm pleural-based nodule seen at the right upper lung.   Other Result Information   Epic, Interface Incoming Radiology Results - 07/21/2020 12:09 PM CDT  CT Cervical Spine    Technique: Multiple helical transaxial images were obtained through the cervical spine. Sagittal and coronal reformats were constructed. Automatic exposure control was used for this study.     Degenerative changes as noted regard to the nodule the son was told about this nodule to follow-up with the PCP  No headaches pain size right knee upper back posterior neck region   she went to walk-in August 17, 2020 pain upper back back of the head chronic bilateral knee pain prednisone 40 mg for 5 days was given she still taking which help tramadol if needed        .  She denies an any fever, , chest pain, shortness of breath, abdominal pain, nausea, vomiting, diarrhea.  Other systems are negative.    Show images for XR KNEE 3 VIEWS RIGHT   Study Result    EXAM: XR KNEE 3 VIEWS RIGHT     CLINICAL INDICATION: Chronic right knee pain     COMPARISON: Right knee 03/02/2018     FINDINGS: There is severe narrowing of the medial compartment of the left knee joint.  There is  subchondral sclerosis of the opposing articular surfaces as well as productive osteophytes.  There is mild spurring of the tibial spines, the lateral   compartment and the patellofemoral compartment of the right knee joint.  There is mild narrowing of the patellofemoral compartment.  There is no gross evidence of chondrocalcinosis.  There is a small right knee joint effusion.  There does not appear to   be any appreciable change when compared to the prior study.     IMPRESSION:     1.    Severe osteoarthritis of the right knee particularly involving the medial compartment.  2.   2.  Small right knee joint effusion        Electronically Signed by: AMNA BEEBE MD   Signed on: 3/8/2019 8:11 AM         Bilateral knees, 4 views on the right, 4 views on the left.                                                                                       Comparison study:  None                                                                                                                           INDICATIONS:                                                             Bilateral knee pain chronically                                                                                                                   Findings: On the right side, there is severe near-complete loss of  joint space and spurring involving the medial compartment.  Less  severe degenerative changes demonstrate in the patellofemoral joint.   There is a small joint effusion.  No acute fracture or malalignment  demonstrated on the right.                                                                           On the left, there is moderate loss of joint space involving the  medial compartment compatible with degenerative change.  There are  couple calcific densities overlying the anterior posterior joint space  on the lateral film which may represent intraarticular loose bodies.   These measure about 7 mm in size each. No acute fracture or  malalignment  is demonstrated on the left.                                                                           Impression:  1 no acute fracture.                                                                                                                 2. severe degenerative changes on the right.                             3.  Moderate degenerative change on the left with probable  intraarticular loose body.                                                                                                             Salvador Nichols M.D.                                                   [facility]                                             ()                            THIS IS AN ELECTRONICALLY VERIFIED REPORT            3/2/2018 4:39 PM:  Salvador Nichols M.D.                                                                                                                                                                                                                                                                                                                               JRT/jrt                                                                  D:  03-  16:39                                                    T:  03-                 Current Medications:  Current Outpatient Medications   Medication Sig Dispense Refill   • sennosides-docusate sodium (SENOKOT-S) 8.6-50 MG tablet Twice Daily     • predniSONE (DELTASONE) 20 MG tablet Take 2 tablets by mouth daily for 5 days. 10 tablet 0   • fluticasone (FLONASE) 50 MCG/ACT nasal spray Spray 2 sprays in each nostril daily. 16 g 12   • simvastatin (ZOCOR) 20 MG tablet Take 1 tablet by mouth nightly. 90 tablet 3   • Cholecalciferol (VITAMIN D3) 2000 units capsule Take 2,000 Units by mouth daily. 90 capsule 3   • prednisoLONE acetate (PRED FORTE, OMNIPRED) 1 % ophthalmic suspension One drop in the left eye 2 times a day for  1 week.     • metFORMIN (GLUCOPHAGE-XR) 500 MG 24 hr tablet Twice Daily With Meals     • traMADol (ULTRAM) 50 MG tablet Take 1 tablet by mouth every 6 hours as needed for Pain. 15 tablet 0   • Cholecalciferol (VITAMIN D3) 50 mcg (2,000 units) capsule TK 1 C PO D  2   • simvastatin (ZOCOR) 20 MG tablet TK 1 T PO NIGHTLY  3   • azithromycin (ZITHROMAX) 250 MG tablet 2 tablets by mouth Day 1, then 1 tablet by mouth once daily Day 2-5. 6 tablet 0   • diclofenac (VOLTAREN) 1 % gel Apply thin layer to affected areas twice daily as needed for pain. 300 g 1   • sodium chloride (OCEAN) 0.65 % nasal spray One spray in each  Nostril every 4 hours as needed for congestion 30 mL 5     No current facility-administered medications for this visit.       Most Recent Labs:    WHITE BLOOD CELL COUNT   Date Value Ref Range Status   09/27/2019 5.9 4.0 - 10.0 10*3/uL Final     HEMATOCRIT   Date Value Ref Range Status   09/27/2019 39.3 34.0 - 45.0 % Final     HEMOGLOBIN   Date Value Ref Range Status   09/27/2019 12.7 11.2 - 15.7 g/dL Final     PLATELET COUNT   Date Value Ref Range Status   09/27/2019 249 150 - 400 10*3/uL Final      NA (SODIUM, SERUM)   Date Value Ref Range Status   09/27/2019 140 136 - 146 mmol/L Final     K (POTASSIUM, SERUM)   Date Value Ref Range Status   09/27/2019 5.1 3.5 - 5.3 mmol/L Final     CHLORIDE, SERUM   Date Value Ref Range Status   09/27/2019 102 96 - 107 mmol/L Final     GLUCOSE, RANDOM   Date Value Ref Range Status   10/26/2018 112 70 - 200 mg/dL Final     CALCIUM, SERUM   Date Value Ref Range Status   09/27/2019 9.9 8.6 - 10.6 mg/dL Final     CO2 VENOUS   Date Value Ref Range Status   09/27/2019 26 22 - 32 mmol/L Final     BLOOD UREA NITROGEN   Date Value Ref Range Status   09/27/2019 20 7 - 20 mg/dL Final     CREATININE, SERUM   Date Value Ref Range Status   09/27/2019 1.1 0.5 - 1.4 mg/dL Final     SEDIMENTATION RATE, RBC   Date Value Ref Range Status   03/02/2018 24 (H) 0 - 20 mm/h Final      C-REACTIVE PROTEIN, QUANT   Date Value Ref Range Status   03/02/2018 0.70 0.00 - 1.00 mg/dL Final           WHITE BLOOD CELL COUNT   Date Value Ref Range Status   09/27/2019 5.9 4.0 - 10.0 10*3/uL Final   04/10/2018 5.2 4.0 - 10.0 10*3/uL Final   12/13/2017 5.2 4.0 - 10.0 10*3/uL Final     HEMATOCRIT   Date Value Ref Range Status   09/27/2019 39.3 34.0 - 45.0 % Final   04/10/2018 37.0 34.0 - 45.0 % Final   12/13/2017 36.6 34.0 - 45.0 % Final     HEMOGLOBIN   Date Value Ref Range Status   09/27/2019 12.7 11.2 - 15.7 g/dL Final   04/10/2018 12.5 11.2 - 15.7 g/dL Final   12/13/2017 11.8 11.2 - 15.7 g/dL Final     PLATELET COUNT   Date Value Ref Range Status   09/27/2019 249 150 - 400 10*3/uL Final   04/10/2018 249 150 - 400 10*3/uL Final   12/13/2017 214 150 - 400 10*3/uL Final     NA (SODIUM, SERUM)   Date Value Ref Range Status   09/27/2019 140 136 - 146 mmol/L Final   02/15/2019 141 136 - 146 mmol/L Final   10/26/2018 138 136 - 146 mmol/L Final     K (POTASSIUM, SERUM)   Date Value Ref Range Status   09/27/2019 5.1 3.5 - 5.3 mmol/L Final   02/15/2019 4.9 3.5 - 5.3 mmol/L Final   10/26/2018 4.8 3.5 - 5.3 mmol/L Final     CHLORIDE, SERUM   Date Value Ref Range Status   09/27/2019 102 96 - 107 mmol/L Final   02/15/2019 103 96 - 107 mmol/L Final   10/26/2018 100 96 - 107 mmol/L Final     GLUCOSE, RANDOM   Date Value Ref Range Status   10/26/2018 112 70 - 200 mg/dL Final   08/08/2018 101 70 - 200 mg/dL Final   08/18/2017 115 70 - 200 mg/dL Final     CALCIUM, SERUM   Date Value Ref Range Status   09/27/2019 9.9 8.6 - 10.6 mg/dL Final   02/15/2019 9.7 8.6 - 10.6 mg/dL Final   10/26/2018 10.3 8.6 - 10.6 mg/dL Final     CO2 VENOUS   Date Value Ref Range Status   09/27/2019 26 22 - 32 mmol/L Final   02/15/2019 28 22 - 32 mmol/L Final   10/26/2018 24 22 - 32 mmol/L Final     BLOOD UREA NITROGEN   Date Value Ref Range Status   09/27/2019 20 7 - 20 mg/dL Final   02/15/2019 25 (H) 7 - 20 mg/dL Final   10/26/2018 18 7 - 20  mg/dL Final     CREATININE, SERUM   Date Value Ref Range Status   09/27/2019 1.1 0.5 - 1.4 mg/dL Final   02/15/2019 1.0 0.5 - 1.4 mg/dL Final   10/26/2018 0.9 0.5 - 1.4 mg/dL Final     SEDIMENTATION RATE, RBC   Date Value Ref Range Status   03/02/2018 24 (H) 0 - 20 mm/h Final   12/13/2017 34 (H) 0 - 20 mm/h Final   08/18/2017 36 (H) 0 - 20 mm/h Final         Visit Vitals  BP (!) 148/56   Pulse 90   Wt 54.4 kg (120 lb)   BMI 23.44 kg/m²     PHYSICAL EXAM    Temporal artery 2+ nontender  Lungs:  Clear to auscultation  Heart:  RRR S1, S2,    Abdomen:  Soft, non tender, no hepatomegaly  Extremities:  No edema  Musculoskeletal:  no swelling, redness or warmth of the DIP's, PIPs or MCPs, wrists, elbows, shoulders, hips, knee[minimal swelling crepitus mild pain flexion right knee nontender no swelling  ankle .  No pain on palpation.  Full range of motion of joints.   Neck no pain upon rotation back minimal pain upon flexion    Under aseptic conditions, the right knee medially was prepped with iodine three times.  No fluid  was aspirated.  80 m g of depomedrol was injected.  There were no complications.  The risk of infection, bleeding, pain, swelling, not helping , hypopigmentation and thinning of the skin were discussed with the patient and the she gave verbal consent to the procedure.  .  The patient will contact us if there is any fever or if redness, warmth, increased swelling occurs in the procedure area.                 ASSESSMENT/PLAN    Patient is a 81-year-old female with history of degenerative arthritis of the cervical spine osteoarthritis multiple joints severe right knee here for follow-up visit    Stressed to the son pain meds can increase falls dizziness does not refer taking only acetaminophen Tylenol arthritis no more than 2 at once max 3/day told it affect kidneys and liver    Right knee injection    Reminded about the lung nodule that was seen on the see of cervical CT to follow-up with the PCP    Return  4 months     Greater than 50% of the visit was spent counseling regarding above issues; total time spent  26  minutes.    Kieran Vidal MD     Opt out

## 2022-10-29 NOTE — ED PROVIDER NOTE - CLINICAL SUMMARY MEDICAL DECISION MAKING FREE TEXT BOX
63 yo man with chest pain and SOB with exertion for months.  Well known to Dr. Condon.  Has had negative stress testing but plan was cath in a few days.  today patient felt worse.  Workup in ED was ok, but I preferred admission to ensure no evidence of severe CAD.  Patient preferred to go home and follow up with Dr. Condon with an understanding of risks.  Patient with full capacity.

## 2022-10-29 NOTE — ED ADULT NURSE NOTE - OBJECTIVE STATEMENT
Patient AOx3, c/o HTN at home prompt him to come to the ER, patient also reported left sided discomfort, however, denies of any chest pain/discomfort now.

## 2022-10-30 LAB
ALBUMIN SERPL ELPH-MCNC: 4 G/DL — SIGNIFICANT CHANGE UP (ref 3.5–5.2)
ALP SERPL-CCNC: 83 U/L — SIGNIFICANT CHANGE UP (ref 30–115)
ALT FLD-CCNC: 26 U/L — SIGNIFICANT CHANGE UP (ref 0–41)
ANION GAP SERPL CALC-SCNC: 13 MMOL/L — SIGNIFICANT CHANGE UP (ref 7–14)
AST SERPL-CCNC: 23 U/L — SIGNIFICANT CHANGE UP (ref 0–41)
BILIRUB SERPL-MCNC: 0.5 MG/DL — SIGNIFICANT CHANGE UP (ref 0.2–1.2)
BUN SERPL-MCNC: 14 MG/DL — SIGNIFICANT CHANGE UP (ref 10–20)
CALCIUM SERPL-MCNC: 8.9 MG/DL — SIGNIFICANT CHANGE UP (ref 8.4–10.5)
CHLORIDE SERPL-SCNC: 107 MMOL/L — SIGNIFICANT CHANGE UP (ref 98–110)
CO2 SERPL-SCNC: 21 MMOL/L — SIGNIFICANT CHANGE UP (ref 17–32)
CREAT SERPL-MCNC: 0.8 MG/DL — SIGNIFICANT CHANGE UP (ref 0.7–1.5)
EGFR: 99 ML/MIN/1.73M2 — SIGNIFICANT CHANGE UP
GLUCOSE SERPL-MCNC: 127 MG/DL — HIGH (ref 70–99)
POTASSIUM SERPL-MCNC: 4.4 MMOL/L — SIGNIFICANT CHANGE UP (ref 3.5–5)
POTASSIUM SERPL-SCNC: 4.4 MMOL/L — SIGNIFICANT CHANGE UP (ref 3.5–5)
PROT SERPL-MCNC: 6.7 G/DL — SIGNIFICANT CHANGE UP (ref 6–8)
SODIUM SERPL-SCNC: 141 MMOL/L — SIGNIFICANT CHANGE UP (ref 135–146)
TROPONIN T SERPL-MCNC: <0.01 NG/ML — SIGNIFICANT CHANGE UP

## 2022-11-06 ENCOUNTER — NON-APPOINTMENT (OUTPATIENT)
Age: 62
End: 2022-11-06

## 2022-12-14 ENCOUNTER — OUTPATIENT (OUTPATIENT)
Dept: OUTPATIENT SERVICES | Facility: HOSPITAL | Age: 62
LOS: 1 days | Discharge: HOME | End: 2022-12-14

## 2022-12-14 VITALS
DIASTOLIC BLOOD PRESSURE: 75 MMHG | OXYGEN SATURATION: 97 % | RESPIRATION RATE: 16 BRPM | SYSTOLIC BLOOD PRESSURE: 127 MMHG | HEART RATE: 75 BPM

## 2022-12-14 DIAGNOSIS — Z98.890 OTHER SPECIFIED POSTPROCEDURAL STATES: Chronic | ICD-10-CM

## 2022-12-14 DIAGNOSIS — Z98.84 BARIATRIC SURGERY STATUS: Chronic | ICD-10-CM

## 2022-12-14 DIAGNOSIS — I25.10 ATHEROSCLEROTIC HEART DISEASE OF NATIVE CORONARY ARTERY WITHOUT ANGINA PECTORIS: ICD-10-CM

## 2022-12-14 DIAGNOSIS — Z96.642 PRESENCE OF LEFT ARTIFICIAL HIP JOINT: Chronic | ICD-10-CM

## 2022-12-14 DIAGNOSIS — Z90.49 ACQUIRED ABSENCE OF OTHER SPECIFIED PARTS OF DIGESTIVE TRACT: Chronic | ICD-10-CM

## 2022-12-14 LAB
ANION GAP SERPL CALC-SCNC: 13 MMOL/L — SIGNIFICANT CHANGE UP (ref 7–14)
BUN SERPL-MCNC: 9 MG/DL — LOW (ref 10–20)
CALCIUM SERPL-MCNC: 9.1 MG/DL — SIGNIFICANT CHANGE UP (ref 8.4–10.5)
CHLORIDE SERPL-SCNC: 101 MMOL/L — SIGNIFICANT CHANGE UP (ref 98–110)
CO2 SERPL-SCNC: 25 MMOL/L — SIGNIFICANT CHANGE UP (ref 17–32)
CREAT SERPL-MCNC: 0.9 MG/DL — SIGNIFICANT CHANGE UP (ref 0.7–1.5)
EGFR: 95 ML/MIN/1.73M2 — SIGNIFICANT CHANGE UP
GLUCOSE SERPL-MCNC: 112 MG/DL — HIGH (ref 70–99)
HCT VFR BLD CALC: 47.7 % — SIGNIFICANT CHANGE UP (ref 42–52)
HGB BLD-MCNC: 16.5 G/DL — SIGNIFICANT CHANGE UP (ref 14–18)
MCHC RBC-ENTMCNC: 29.6 PG — SIGNIFICANT CHANGE UP (ref 27–31)
MCHC RBC-ENTMCNC: 34.6 G/DL — SIGNIFICANT CHANGE UP (ref 32–37)
MCV RBC AUTO: 85.6 FL — SIGNIFICANT CHANGE UP (ref 80–94)
NRBC # BLD: 0 /100 WBCS — SIGNIFICANT CHANGE UP (ref 0–0)
PLATELET # BLD AUTO: 238 K/UL — SIGNIFICANT CHANGE UP (ref 130–400)
POTASSIUM SERPL-MCNC: 4.6 MMOL/L — SIGNIFICANT CHANGE UP (ref 3.5–5)
POTASSIUM SERPL-SCNC: 4.6 MMOL/L — SIGNIFICANT CHANGE UP (ref 3.5–5)
RBC # BLD: 5.57 M/UL — SIGNIFICANT CHANGE UP (ref 4.7–6.1)
RBC # FLD: 14 % — SIGNIFICANT CHANGE UP (ref 11.5–14.5)
SODIUM SERPL-SCNC: 139 MMOL/L — SIGNIFICANT CHANGE UP (ref 135–146)
WBC # BLD: 11.62 K/UL — HIGH (ref 4.8–10.8)
WBC # FLD AUTO: 11.62 K/UL — HIGH (ref 4.8–10.8)

## 2022-12-14 RX ORDER — ATORVASTATIN CALCIUM 80 MG/1
1 TABLET, FILM COATED ORAL
Qty: 0 | Refills: 0 | DISCHARGE

## 2022-12-14 RX ORDER — ESOMEPRAZOLE MAGNESIUM 40 MG/1
20 CAPSULE, DELAYED RELEASE ORAL
Qty: 0 | Refills: 0 | DISCHARGE

## 2022-12-14 RX ORDER — ASPIRIN/CALCIUM CARB/MAGNESIUM 324 MG
2 TABLET ORAL
Qty: 0 | Refills: 0 | DISCHARGE

## 2022-12-14 RX ORDER — METOPROLOL TARTRATE 50 MG
1 TABLET ORAL
Qty: 0 | Refills: 0 | DISCHARGE

## 2022-12-14 RX ORDER — CETIRIZINE HYDROCHLORIDE 10 MG/1
10 TABLET ORAL
Qty: 0 | Refills: 0 | DISCHARGE

## 2022-12-14 RX ORDER — ASPIRIN/CALCIUM CARB/MAGNESIUM 324 MG
1 TABLET ORAL
Qty: 0 | Refills: 0 | DISCHARGE

## 2022-12-14 NOTE — H&P CARDIOLOGY - HISTORY OF PRESENT ILLNESS
Patient is a 62y Male who presents to the cardiology department for LHC.       Pre cath note:    indication:  [ ] STEMI                [ ] NSTEMI                 [ ] Acute coronary syndrome                     [ ]Unstable Angina   [ ] high risk  [ ] intermediate risk  [ ] low risk                     [ x] Stable Angina     non-invasive testing:                          Date:           9/2022          result: [ ] high risk  [ ] intermediate risk  [ x] low risk    Anti- Anginal medications:                    [ ] not used                       [ ] used                   [x ] not used but strong indication not to use    Ejection Fraction                   [ ] <29            [ ] 30-39%   [ ] 40-49%     [x ]>50%    CHF                   [ ] active (within last 14 days on meds   [ ] Chronic (on meds but no exacerbation)    COPD                   [ ] mild (on chronic bronchodilators)  [ ] moderate (on chronic steroid therapy)      [ ] severe (indication for home O2 or PACO2 >50)    Other risk factors:                       [ ] Previous MI                     [ ] CVA/ stroke                    [ ] carotid stent/ CEA                    [ ] PVD/PAD- (arterial aneurysm, non-palpable pulses, tortuous vessel with inability to insert catheter, infra-renal dissection, renal or subclavian artery stenosis)                    [ ] diabetic                    [x ] previous CABG                    [ ] Renal Failure    Single Antianginal use at this time due to borderline BP   cc/hr IV x 1 hour prior to cath   EF 60-65% 9/2022  Adjusted Bleeding Score: 1.0%    SUBJ:  63 y/o male presents for [facility].  Pt c/o POSADA and SOB worsening over last few months.  Denies chest pains         PROCEDURE: Bilateral coronary angiography, attempted  of LAD, IVUS  3/25/21    Physician: Dr. Catarina Irizarry  Assistant: Keagan Tapia DO, Boone Meyer MD  FINDINGS  Unsuccessful attempt  of mid LAD, complicated by wire induced perforation and moderate to large PE, with mild RA and RV inservion on echo. Balloon was inflated in the vessel to occlude the flow and reduce PE expansion. CT surgery was emergently contacted and evaluated the patient (Dr. Scott and Dr. Ag) on the table. Patient was given Protamine sulfate to reverse a/c.  Joint decision was made to intubate the patient and bring the patient emergently to the OR for LIMA to LAD and pericardial fluid drainage. Patient left the lab in stable, but serious condition.    POST-OP DIAGNOSIS  CAD, Unsuccessful PCI of LAD, complicated by Coronary perforation  PLAN OF CARE  Urgent transfer to OR for LIMA to LAD and pericardial window.      Past Medical History:    CAD  HTN  HLD  COPD  TIA    Past Surgical History:  CABG x 1   Gastric Sleeve  Rotator cuff repair      REVIEW OF SYSTEMS:  CONSTITUTIONAL: No fever, weight loss, or fatigue  CARDIOLOGY: PAtient denies chest pain, shortness of breath or syncopal episodes.   RESPIRATORY: denies shortness of breath, wheezing.   NEUROLOGICAL: NO weakness, no focal deficits to report.  GI: no BRBPR, no N,V,diarrhea.     PHYSICAL EXAM:  · CONSTITUTIONAL:	Well-developed, well nourished     · RESPIRATORY:   airway patent; breath sounds equal; good air movement; respirations non-labored; clear to auscultation bilaterally; no chest wall tenderness; no intercostal retractions; no rales,rhonchi or wheeze  · CARDIOVASCULAR	regular rate and rhythm  no rub  no murmur    · EXTREMITIES: No cyanosis, clubbing or edema  · VASCULAR: 	Equal and normal pulses (carotid, femoral, dorsalis pedis)  	  Johnie Test ABNORMAL

## 2022-12-14 NOTE — CHART NOTE - NSCHARTNOTEFT_GEN_A_CORE
PRE-OP DIAGNOSIS:    POSADA  Abnormal stress test    PROCEDURE:     [x] Coronary Angiogram     [x] LHC     [] LVG     [] RHC     [] Intervention (see below)         PHYSICIAN:  Dr. Olmedo    ASSISTANT: Dr. Abner Palacios        PROCEDURE DESCRIPTION:     Consent:      [x] Patient     [] Family Member     []  Used        Anesthesia:     [] General     [x] Sedation     [x] Local        Access & Closure:     [x] L 6 Fr Radial Artery D stat    [] Fr Femoral Artery     [] Fr Femoral Vein     [] Fr Brachial Vein       IV Contrast: 60 mL       FINDINGS:     Left dominant system                                 Left main: angiographically normal    LAD:     proximal: luminal irregularities  mid: 100% occlusion right after major diagonal branch with an early take of  distal: fills via right to left and left to left collaterals                     Diag 1: medium caliber vessel, angiographically normal supplies left to left collaterals to LAD  Diag 2: small caliber which fills via left to left collaterals    Left Circumflex: luminal irregularities in proximal portion and focal 40% stenosis at bifurcation of Om1, Distal LCx is angiographically normal  OM1: medium caliber with 60% ostial stenosis  LPDA: angiographically normal    Right Coronary Artery: small non dominant with mild luminal irregularities    Ramus Intermedius: medium caliber, with luminal irregularities    LIMA graft: patent    LVEDP: 19     ESTIMATED BLOOD LOSS: < 10 mL        CONDITION:     [x] Good     [] Fair     [] Critical        SPECIMEN REMOVED: N/A       POST-OP DIAGNOSIS:      [] Normal Coronary Angiogram     [] Mild Coronary Artery Disease (< 50% stenosis)     [x] Known 1 Vessel Coronary Artery Disease; patent LIMA graft       PLAN OF CARE:     [x] D/C Home Today     [x] optimize medical therapy    [x] IV Fluids

## 2022-12-19 DIAGNOSIS — I20.8 OTHER FORMS OF ANGINA PECTORIS: ICD-10-CM

## 2022-12-19 DIAGNOSIS — I25.118 ATHEROSCLEROTIC HEART DISEASE OF NATIVE CORONARY ARTERY WITH OTHER FORMS OF ANGINA PECTORIS: ICD-10-CM

## 2022-12-19 DIAGNOSIS — I10 ESSENTIAL (PRIMARY) HYPERTENSION: ICD-10-CM

## 2022-12-19 DIAGNOSIS — Z95.1 PRESENCE OF AORTOCORONARY BYPASS GRAFT: ICD-10-CM

## 2022-12-19 DIAGNOSIS — E78.00 PURE HYPERCHOLESTEROLEMIA, UNSPECIFIED: ICD-10-CM

## 2023-01-24 ENCOUNTER — APPOINTMENT (OUTPATIENT)
Dept: ORTHOPEDIC SURGERY | Facility: CLINIC | Age: 63
End: 2023-01-24
Payer: COMMERCIAL

## 2023-01-24 VITALS — HEIGHT: 72 IN | BODY MASS INDEX: 30.48 KG/M2 | WEIGHT: 225 LBS

## 2023-01-24 DIAGNOSIS — M75.31 CALCIFIC TENDINITIS OF RIGHT SHOULDER: ICD-10-CM

## 2023-01-24 PROCEDURE — 20610 DRAIN/INJ JOINT/BURSA W/O US: CPT | Mod: RT

## 2023-01-24 PROCEDURE — 73030 X-RAY EXAM OF SHOULDER: CPT | Mod: RT

## 2023-01-24 PROCEDURE — 99203 OFFICE O/P NEW LOW 30 MIN: CPT | Mod: 25

## 2023-01-24 NOTE — ASSESSMENT
[FreeTextEntry1] :  At this time we discussed calcific tendinitis.  We agreed to do cortisone injection.  Today in the office under sterile conditions I injected the right shoulder posterior approach 3 cc lidocaine, 2 cc dexamethasone.  Patient tolerated procedure well.  Puncture was covered Band-Aid.  Icing if he has any soreness.  Physical therapy prescription provided.  Follow-up in the office with Dr. Chawla.\par \par This patient was seen under the supervision of Dr. Aguilar.\par

## 2023-01-24 NOTE — HISTORY OF PRESENT ILLNESS
[de-identified] : 62-year-old male comes in today for evaluation of his right shoulder and upper arm pain that began on January 19, 2023.  Patient states that his front door was jammed and may have injured the shoulder.  Patient is right-hand dominant.  He states that he has had prior rotator cuff surgery with Dr. Chawla about 13 years ago on this shoulder.  He has history of recent cardiac bypass March 2021. He takes metoprolol, atorvastatin and aspirin.

## 2023-01-24 NOTE — IMAGING
[de-identified] :  On examination of the right shoulder no swelling, no ecchymosis, no erythema.  Skin is intact tenderness is noted to the anterior shoulder and deltoid to palpation.  Patient active range of motion is limited.  Forward flexion to approximately 90°, passively any further significant pain.  He has negative drop-arm, although weakness and pain noted through rotator cuff resistance.  Restriction through internal external rotation, positive Amezcua.  Good range of motion to the elbow and wrist\par \par X-ray right shoulder today Mild large calcification noted adjacent to the humeral head, most likely consistent with calcific tendinitis, arthritic change also noted around AC joint.  No obvious fracture or dislocation

## 2023-02-15 ENCOUNTER — APPOINTMENT (OUTPATIENT)
Age: 63
End: 2023-02-15

## 2023-02-15 ENCOUNTER — OUTPATIENT (OUTPATIENT)
Dept: OUTPATIENT SERVICES | Facility: HOSPITAL | Age: 63
LOS: 1 days | End: 2023-02-15
Payer: COMMERCIAL

## 2023-02-15 DIAGNOSIS — Z90.49 ACQUIRED ABSENCE OF OTHER SPECIFIED PARTS OF DIGESTIVE TRACT: Chronic | ICD-10-CM

## 2023-02-15 DIAGNOSIS — I25.810 ATHEROSCLEROSIS OF CORONARY ARTERY BYPASS GRAFT(S) WITHOUT ANGINA PECTORIS: ICD-10-CM

## 2023-02-15 PROCEDURE — 93798 PHYS/QHP OP CAR RHAB W/ECG: CPT

## 2023-02-16 DIAGNOSIS — I25.810 ATHEROSCLEROSIS OF CORONARY ARTERY BYPASS GRAFT(S) WITHOUT ANGINA PECTORIS: ICD-10-CM

## 2023-02-17 ENCOUNTER — OUTPATIENT (OUTPATIENT)
Dept: OUTPATIENT SERVICES | Facility: HOSPITAL | Age: 63
LOS: 1 days | Discharge: HOME | End: 2023-02-17

## 2023-02-17 DIAGNOSIS — Z98.84 BARIATRIC SURGERY STATUS: Chronic | ICD-10-CM

## 2023-02-17 DIAGNOSIS — Z98.890 OTHER SPECIFIED POSTPROCEDURAL STATES: Chronic | ICD-10-CM

## 2023-02-17 DIAGNOSIS — Z96.642 PRESENCE OF LEFT ARTIFICIAL HIP JOINT: Chronic | ICD-10-CM

## 2023-02-17 DIAGNOSIS — Z90.49 ACQUIRED ABSENCE OF OTHER SPECIFIED PARTS OF DIGESTIVE TRACT: Chronic | ICD-10-CM

## 2023-02-17 DIAGNOSIS — I25.810 ATHEROSCLEROSIS OF CORONARY ARTERY BYPASS GRAFT(S) WITHOUT ANGINA PECTORIS: ICD-10-CM

## 2023-02-22 ENCOUNTER — OUTPATIENT (OUTPATIENT)
Dept: OUTPATIENT SERVICES | Facility: HOSPITAL | Age: 63
LOS: 1 days | Discharge: HOME | End: 2023-02-22

## 2023-02-22 DIAGNOSIS — Z96.642 PRESENCE OF LEFT ARTIFICIAL HIP JOINT: Chronic | ICD-10-CM

## 2023-02-22 DIAGNOSIS — Z98.890 OTHER SPECIFIED POSTPROCEDURAL STATES: Chronic | ICD-10-CM

## 2023-02-22 DIAGNOSIS — I25.810 ATHEROSCLEROSIS OF CORONARY ARTERY BYPASS GRAFT(S) WITHOUT ANGINA PECTORIS: ICD-10-CM

## 2023-02-22 DIAGNOSIS — Z90.49 ACQUIRED ABSENCE OF OTHER SPECIFIED PARTS OF DIGESTIVE TRACT: Chronic | ICD-10-CM

## 2023-02-22 DIAGNOSIS — Z98.84 BARIATRIC SURGERY STATUS: Chronic | ICD-10-CM

## 2023-02-24 ENCOUNTER — OUTPATIENT (OUTPATIENT)
Dept: OUTPATIENT SERVICES | Facility: HOSPITAL | Age: 63
LOS: 1 days | Discharge: HOME | End: 2023-02-24

## 2023-02-24 DIAGNOSIS — I25.810 ATHEROSCLEROSIS OF CORONARY ARTERY BYPASS GRAFT(S) WITHOUT ANGINA PECTORIS: ICD-10-CM

## 2023-02-27 ENCOUNTER — OUTPATIENT (OUTPATIENT)
Dept: OUTPATIENT SERVICES | Facility: HOSPITAL | Age: 63
LOS: 1 days | Discharge: HOME | End: 2023-02-27

## 2023-02-27 DIAGNOSIS — Z98.84 BARIATRIC SURGERY STATUS: Chronic | ICD-10-CM

## 2023-02-27 DIAGNOSIS — Z96.642 PRESENCE OF LEFT ARTIFICIAL HIP JOINT: Chronic | ICD-10-CM

## 2023-02-27 DIAGNOSIS — I25.810 ATHEROSCLEROSIS OF CORONARY ARTERY BYPASS GRAFT(S) WITHOUT ANGINA PECTORIS: ICD-10-CM

## 2023-02-27 DIAGNOSIS — Z90.49 ACQUIRED ABSENCE OF OTHER SPECIFIED PARTS OF DIGESTIVE TRACT: Chronic | ICD-10-CM

## 2023-02-27 DIAGNOSIS — Z98.890 OTHER SPECIFIED POSTPROCEDURAL STATES: Chronic | ICD-10-CM

## 2023-03-01 ENCOUNTER — OUTPATIENT (OUTPATIENT)
Dept: OUTPATIENT SERVICES | Facility: HOSPITAL | Age: 63
LOS: 1 days | Discharge: HOME | End: 2023-03-01
Payer: COMMERCIAL

## 2023-03-01 ENCOUNTER — APPOINTMENT (OUTPATIENT)
Age: 63
End: 2023-03-01

## 2023-03-01 DIAGNOSIS — I25.810 ATHEROSCLEROSIS OF CORONARY ARTERY BYPASS GRAFT(S) WITHOUT ANGINA PECTORIS: ICD-10-CM

## 2023-03-01 DIAGNOSIS — Z98.84 BARIATRIC SURGERY STATUS: Chronic | ICD-10-CM

## 2023-03-01 PROCEDURE — 93798 PHYS/QHP OP CAR RHAB W/ECG: CPT

## 2023-03-06 ENCOUNTER — OUTPATIENT (OUTPATIENT)
Dept: OUTPATIENT SERVICES | Facility: HOSPITAL | Age: 63
LOS: 1 days | Discharge: HOME | End: 2023-03-06

## 2023-03-06 DIAGNOSIS — I25.810 ATHEROSCLEROSIS OF CORONARY ARTERY BYPASS GRAFT(S) WITHOUT ANGINA PECTORIS: ICD-10-CM

## 2023-03-06 DIAGNOSIS — Z98.84 BARIATRIC SURGERY STATUS: Chronic | ICD-10-CM

## 2023-03-06 DIAGNOSIS — Z96.642 PRESENCE OF LEFT ARTIFICIAL HIP JOINT: Chronic | ICD-10-CM

## 2023-03-06 DIAGNOSIS — Z98.890 OTHER SPECIFIED POSTPROCEDURAL STATES: Chronic | ICD-10-CM

## 2023-03-06 DIAGNOSIS — Z90.49 ACQUIRED ABSENCE OF OTHER SPECIFIED PARTS OF DIGESTIVE TRACT: Chronic | ICD-10-CM

## 2023-03-08 ENCOUNTER — APPOINTMENT (OUTPATIENT)
Dept: ORTHOPEDIC SURGERY | Facility: CLINIC | Age: 63
End: 2023-03-08

## 2023-03-08 ENCOUNTER — OUTPATIENT (OUTPATIENT)
Dept: OUTPATIENT SERVICES | Facility: HOSPITAL | Age: 63
LOS: 1 days | Discharge: HOME | End: 2023-03-08

## 2023-03-08 DIAGNOSIS — Z98.890 OTHER SPECIFIED POSTPROCEDURAL STATES: Chronic | ICD-10-CM

## 2023-03-08 DIAGNOSIS — Z96.642 PRESENCE OF LEFT ARTIFICIAL HIP JOINT: Chronic | ICD-10-CM

## 2023-03-08 DIAGNOSIS — Z98.84 BARIATRIC SURGERY STATUS: Chronic | ICD-10-CM

## 2023-03-08 DIAGNOSIS — Z90.49 ACQUIRED ABSENCE OF OTHER SPECIFIED PARTS OF DIGESTIVE TRACT: Chronic | ICD-10-CM

## 2023-03-09 DIAGNOSIS — I25.810 ATHEROSCLEROSIS OF CORONARY ARTERY BYPASS GRAFT(S) WITHOUT ANGINA PECTORIS: ICD-10-CM

## 2023-03-13 ENCOUNTER — OUTPATIENT (OUTPATIENT)
Dept: OUTPATIENT SERVICES | Facility: HOSPITAL | Age: 63
LOS: 1 days | Discharge: HOME | End: 2023-03-13

## 2023-03-13 DIAGNOSIS — Z98.84 BARIATRIC SURGERY STATUS: Chronic | ICD-10-CM

## 2023-03-13 DIAGNOSIS — I25.810 ATHEROSCLEROSIS OF CORONARY ARTERY BYPASS GRAFT(S) WITHOUT ANGINA PECTORIS: ICD-10-CM

## 2023-03-13 DIAGNOSIS — Z90.49 ACQUIRED ABSENCE OF OTHER SPECIFIED PARTS OF DIGESTIVE TRACT: Chronic | ICD-10-CM

## 2023-03-13 DIAGNOSIS — Z96.642 PRESENCE OF LEFT ARTIFICIAL HIP JOINT: Chronic | ICD-10-CM

## 2023-03-13 DIAGNOSIS — Z98.890 OTHER SPECIFIED POSTPROCEDURAL STATES: Chronic | ICD-10-CM

## 2023-03-15 ENCOUNTER — APPOINTMENT (OUTPATIENT)
Age: 63
End: 2023-03-15

## 2023-03-15 ENCOUNTER — OUTPATIENT (OUTPATIENT)
Dept: OUTPATIENT SERVICES | Facility: HOSPITAL | Age: 63
LOS: 1 days | Discharge: HOME | End: 2023-03-15

## 2023-03-15 DIAGNOSIS — Z96.642 PRESENCE OF LEFT ARTIFICIAL HIP JOINT: Chronic | ICD-10-CM

## 2023-03-15 DIAGNOSIS — Z98.890 OTHER SPECIFIED POSTPROCEDURAL STATES: Chronic | ICD-10-CM

## 2023-03-15 DIAGNOSIS — I25.810 ATHEROSCLEROSIS OF CORONARY ARTERY BYPASS GRAFT(S) WITHOUT ANGINA PECTORIS: ICD-10-CM

## 2023-03-15 DIAGNOSIS — Z90.49 ACQUIRED ABSENCE OF OTHER SPECIFIED PARTS OF DIGESTIVE TRACT: Chronic | ICD-10-CM

## 2023-03-15 DIAGNOSIS — Z98.84 BARIATRIC SURGERY STATUS: Chronic | ICD-10-CM

## 2023-03-17 ENCOUNTER — OUTPATIENT (OUTPATIENT)
Dept: OUTPATIENT SERVICES | Facility: HOSPITAL | Age: 63
LOS: 1 days | Discharge: HOME | End: 2023-03-17

## 2023-03-17 ENCOUNTER — APPOINTMENT (OUTPATIENT)
Age: 63
End: 2023-03-17

## 2023-03-17 DIAGNOSIS — Z98.890 OTHER SPECIFIED POSTPROCEDURAL STATES: Chronic | ICD-10-CM

## 2023-03-17 DIAGNOSIS — Z98.84 BARIATRIC SURGERY STATUS: Chronic | ICD-10-CM

## 2023-03-17 DIAGNOSIS — I25.810 ATHEROSCLEROSIS OF CORONARY ARTERY BYPASS GRAFT(S) WITHOUT ANGINA PECTORIS: ICD-10-CM

## 2023-03-17 DIAGNOSIS — Z96.642 PRESENCE OF LEFT ARTIFICIAL HIP JOINT: Chronic | ICD-10-CM

## 2023-03-17 DIAGNOSIS — Z90.49 ACQUIRED ABSENCE OF OTHER SPECIFIED PARTS OF DIGESTIVE TRACT: Chronic | ICD-10-CM

## 2023-03-20 ENCOUNTER — OUTPATIENT (OUTPATIENT)
Dept: OUTPATIENT SERVICES | Facility: HOSPITAL | Age: 63
LOS: 1 days | Discharge: HOME | End: 2023-03-20

## 2023-03-20 ENCOUNTER — APPOINTMENT (OUTPATIENT)
Age: 63
End: 2023-03-20

## 2023-03-20 DIAGNOSIS — I25.810 ATHEROSCLEROSIS OF CORONARY ARTERY BYPASS GRAFT(S) WITHOUT ANGINA PECTORIS: ICD-10-CM

## 2023-03-20 DIAGNOSIS — Z90.49 ACQUIRED ABSENCE OF OTHER SPECIFIED PARTS OF DIGESTIVE TRACT: Chronic | ICD-10-CM

## 2023-03-20 DIAGNOSIS — Z98.84 BARIATRIC SURGERY STATUS: Chronic | ICD-10-CM

## 2023-03-20 DIAGNOSIS — Z96.642 PRESENCE OF LEFT ARTIFICIAL HIP JOINT: Chronic | ICD-10-CM

## 2023-03-20 DIAGNOSIS — Z98.890 OTHER SPECIFIED POSTPROCEDURAL STATES: Chronic | ICD-10-CM

## 2023-03-22 ENCOUNTER — APPOINTMENT (OUTPATIENT)
Age: 63
End: 2023-03-22

## 2023-03-22 ENCOUNTER — OUTPATIENT (OUTPATIENT)
Dept: OUTPATIENT SERVICES | Facility: HOSPITAL | Age: 63
LOS: 1 days | Discharge: HOME | End: 2023-03-22

## 2023-03-22 DIAGNOSIS — Z96.642 PRESENCE OF LEFT ARTIFICIAL HIP JOINT: Chronic | ICD-10-CM

## 2023-03-22 DIAGNOSIS — I25.810 ATHEROSCLEROSIS OF CORONARY ARTERY BYPASS GRAFT(S) WITHOUT ANGINA PECTORIS: ICD-10-CM

## 2023-03-24 ENCOUNTER — APPOINTMENT (OUTPATIENT)
Age: 63
End: 2023-03-24

## 2023-03-24 ENCOUNTER — OUTPATIENT (OUTPATIENT)
Dept: OUTPATIENT SERVICES | Facility: HOSPITAL | Age: 63
LOS: 1 days | Discharge: HOME | End: 2023-03-24

## 2023-03-24 DIAGNOSIS — Z90.49 ACQUIRED ABSENCE OF OTHER SPECIFIED PARTS OF DIGESTIVE TRACT: Chronic | ICD-10-CM

## 2023-03-24 DIAGNOSIS — Z98.84 BARIATRIC SURGERY STATUS: Chronic | ICD-10-CM

## 2023-03-24 DIAGNOSIS — I25.810 ATHEROSCLEROSIS OF CORONARY ARTERY BYPASS GRAFT(S) WITHOUT ANGINA PECTORIS: ICD-10-CM

## 2023-03-24 DIAGNOSIS — Z96.642 PRESENCE OF LEFT ARTIFICIAL HIP JOINT: Chronic | ICD-10-CM

## 2023-03-24 DIAGNOSIS — Z98.890 OTHER SPECIFIED POSTPROCEDURAL STATES: Chronic | ICD-10-CM

## 2023-03-27 ENCOUNTER — APPOINTMENT (OUTPATIENT)
Age: 63
End: 2023-03-27

## 2023-03-27 ENCOUNTER — OUTPATIENT (OUTPATIENT)
Dept: OUTPATIENT SERVICES | Facility: HOSPITAL | Age: 63
LOS: 1 days | Discharge: HOME | End: 2023-03-27

## 2023-03-27 DIAGNOSIS — Z98.890 OTHER SPECIFIED POSTPROCEDURAL STATES: Chronic | ICD-10-CM

## 2023-03-27 DIAGNOSIS — Z96.642 PRESENCE OF LEFT ARTIFICIAL HIP JOINT: Chronic | ICD-10-CM

## 2023-03-27 DIAGNOSIS — Z98.84 BARIATRIC SURGERY STATUS: Chronic | ICD-10-CM

## 2023-03-27 DIAGNOSIS — Z90.49 ACQUIRED ABSENCE OF OTHER SPECIFIED PARTS OF DIGESTIVE TRACT: Chronic | ICD-10-CM

## 2023-03-27 DIAGNOSIS — I25.810 ATHEROSCLEROSIS OF CORONARY ARTERY BYPASS GRAFT(S) WITHOUT ANGINA PECTORIS: ICD-10-CM

## 2023-03-29 ENCOUNTER — OUTPATIENT (OUTPATIENT)
Dept: OUTPATIENT SERVICES | Facility: HOSPITAL | Age: 63
LOS: 1 days | Discharge: HOME | End: 2023-03-29

## 2023-03-29 ENCOUNTER — APPOINTMENT (OUTPATIENT)
Age: 63
End: 2023-03-29

## 2023-03-29 DIAGNOSIS — Z98.84 BARIATRIC SURGERY STATUS: Chronic | ICD-10-CM

## 2023-03-29 DIAGNOSIS — Z90.49 ACQUIRED ABSENCE OF OTHER SPECIFIED PARTS OF DIGESTIVE TRACT: Chronic | ICD-10-CM

## 2023-03-29 DIAGNOSIS — I25.810 ATHEROSCLEROSIS OF CORONARY ARTERY BYPASS GRAFT(S) WITHOUT ANGINA PECTORIS: ICD-10-CM

## 2023-03-29 DIAGNOSIS — Z98.890 OTHER SPECIFIED POSTPROCEDURAL STATES: Chronic | ICD-10-CM

## 2023-03-29 DIAGNOSIS — Z96.642 PRESENCE OF LEFT ARTIFICIAL HIP JOINT: Chronic | ICD-10-CM

## 2023-03-31 ENCOUNTER — APPOINTMENT (OUTPATIENT)
Age: 63
End: 2023-03-31

## 2023-03-31 ENCOUNTER — OUTPATIENT (OUTPATIENT)
Dept: OUTPATIENT SERVICES | Facility: HOSPITAL | Age: 63
LOS: 1 days | Discharge: HOME | End: 2023-03-31

## 2023-03-31 DIAGNOSIS — I25.810 ATHEROSCLEROSIS OF CORONARY ARTERY BYPASS GRAFT(S) WITHOUT ANGINA PECTORIS: ICD-10-CM

## 2023-03-31 DIAGNOSIS — Z90.49 ACQUIRED ABSENCE OF OTHER SPECIFIED PARTS OF DIGESTIVE TRACT: Chronic | ICD-10-CM

## 2023-03-31 DIAGNOSIS — Z98.890 OTHER SPECIFIED POSTPROCEDURAL STATES: Chronic | ICD-10-CM

## 2023-03-31 DIAGNOSIS — Z96.642 PRESENCE OF LEFT ARTIFICIAL HIP JOINT: Chronic | ICD-10-CM

## 2023-03-31 DIAGNOSIS — Z98.84 BARIATRIC SURGERY STATUS: Chronic | ICD-10-CM

## 2023-04-03 ENCOUNTER — OUTPATIENT (OUTPATIENT)
Dept: OUTPATIENT SERVICES | Facility: HOSPITAL | Age: 63
LOS: 1 days | Discharge: HOME | End: 2023-04-03
Payer: COMMERCIAL

## 2023-04-03 ENCOUNTER — APPOINTMENT (OUTPATIENT)
Age: 63
End: 2023-04-03

## 2023-04-03 DIAGNOSIS — Z90.49 ACQUIRED ABSENCE OF OTHER SPECIFIED PARTS OF DIGESTIVE TRACT: Chronic | ICD-10-CM

## 2023-04-03 DIAGNOSIS — Z96.642 PRESENCE OF LEFT ARTIFICIAL HIP JOINT: Chronic | ICD-10-CM

## 2023-04-03 DIAGNOSIS — I25.810 ATHEROSCLEROSIS OF CORONARY ARTERY BYPASS GRAFT(S) WITHOUT ANGINA PECTORIS: ICD-10-CM

## 2023-04-03 DIAGNOSIS — Z98.84 BARIATRIC SURGERY STATUS: Chronic | ICD-10-CM

## 2023-04-03 DIAGNOSIS — Z98.890 OTHER SPECIFIED POSTPROCEDURAL STATES: Chronic | ICD-10-CM

## 2023-04-03 PROCEDURE — 93798 PHYS/QHP OP CAR RHAB W/ECG: CPT

## 2023-04-05 ENCOUNTER — OUTPATIENT (OUTPATIENT)
Dept: OUTPATIENT SERVICES | Facility: HOSPITAL | Age: 63
LOS: 1 days | Discharge: HOME | End: 2023-04-05

## 2023-04-05 ENCOUNTER — APPOINTMENT (OUTPATIENT)
Age: 63
End: 2023-04-05

## 2023-04-05 DIAGNOSIS — Z98.84 BARIATRIC SURGERY STATUS: Chronic | ICD-10-CM

## 2023-04-05 DIAGNOSIS — Z98.890 OTHER SPECIFIED POSTPROCEDURAL STATES: Chronic | ICD-10-CM

## 2023-04-05 DIAGNOSIS — Z90.49 ACQUIRED ABSENCE OF OTHER SPECIFIED PARTS OF DIGESTIVE TRACT: Chronic | ICD-10-CM

## 2023-04-05 DIAGNOSIS — Z96.642 PRESENCE OF LEFT ARTIFICIAL HIP JOINT: Chronic | ICD-10-CM

## 2023-04-05 DIAGNOSIS — I25.810 ATHEROSCLEROSIS OF CORONARY ARTERY BYPASS GRAFT(S) WITHOUT ANGINA PECTORIS: ICD-10-CM

## 2023-04-07 ENCOUNTER — OUTPATIENT (OUTPATIENT)
Dept: OUTPATIENT SERVICES | Facility: HOSPITAL | Age: 63
LOS: 1 days | Discharge: HOME | End: 2023-04-07

## 2023-04-07 ENCOUNTER — APPOINTMENT (OUTPATIENT)
Age: 63
End: 2023-04-07

## 2023-04-07 DIAGNOSIS — Z98.890 OTHER SPECIFIED POSTPROCEDURAL STATES: Chronic | ICD-10-CM

## 2023-04-07 DIAGNOSIS — Z96.642 PRESENCE OF LEFT ARTIFICIAL HIP JOINT: Chronic | ICD-10-CM

## 2023-04-07 DIAGNOSIS — I25.810 ATHEROSCLEROSIS OF CORONARY ARTERY BYPASS GRAFT(S) WITHOUT ANGINA PECTORIS: ICD-10-CM

## 2023-04-07 DIAGNOSIS — Z90.49 ACQUIRED ABSENCE OF OTHER SPECIFIED PARTS OF DIGESTIVE TRACT: Chronic | ICD-10-CM

## 2023-04-07 DIAGNOSIS — Z98.84 BARIATRIC SURGERY STATUS: Chronic | ICD-10-CM

## 2023-04-10 ENCOUNTER — APPOINTMENT (OUTPATIENT)
Age: 63
End: 2023-04-10

## 2023-04-10 ENCOUNTER — OUTPATIENT (OUTPATIENT)
Dept: OUTPATIENT SERVICES | Facility: HOSPITAL | Age: 63
LOS: 1 days | Discharge: HOME | End: 2023-04-10

## 2023-04-10 DIAGNOSIS — Z98.890 OTHER SPECIFIED POSTPROCEDURAL STATES: Chronic | ICD-10-CM

## 2023-04-10 DIAGNOSIS — I25.810 ATHEROSCLEROSIS OF CORONARY ARTERY BYPASS GRAFT(S) WITHOUT ANGINA PECTORIS: ICD-10-CM

## 2023-04-10 DIAGNOSIS — Z90.49 ACQUIRED ABSENCE OF OTHER SPECIFIED PARTS OF DIGESTIVE TRACT: Chronic | ICD-10-CM

## 2023-04-10 DIAGNOSIS — Z98.84 BARIATRIC SURGERY STATUS: Chronic | ICD-10-CM

## 2023-04-10 DIAGNOSIS — Z96.642 PRESENCE OF LEFT ARTIFICIAL HIP JOINT: Chronic | ICD-10-CM

## 2023-04-12 ENCOUNTER — APPOINTMENT (OUTPATIENT)
Age: 63
End: 2023-04-12

## 2023-04-12 ENCOUNTER — OUTPATIENT (OUTPATIENT)
Dept: OUTPATIENT SERVICES | Facility: HOSPITAL | Age: 63
LOS: 1 days | Discharge: HOME | End: 2023-04-12

## 2023-04-12 DIAGNOSIS — Z98.84 BARIATRIC SURGERY STATUS: Chronic | ICD-10-CM

## 2023-04-12 DIAGNOSIS — I25.810 ATHEROSCLEROSIS OF CORONARY ARTERY BYPASS GRAFT(S) WITHOUT ANGINA PECTORIS: ICD-10-CM

## 2023-04-12 DIAGNOSIS — Z98.890 OTHER SPECIFIED POSTPROCEDURAL STATES: Chronic | ICD-10-CM

## 2023-04-12 DIAGNOSIS — Z96.642 PRESENCE OF LEFT ARTIFICIAL HIP JOINT: Chronic | ICD-10-CM

## 2023-04-12 DIAGNOSIS — Z90.49 ACQUIRED ABSENCE OF OTHER SPECIFIED PARTS OF DIGESTIVE TRACT: Chronic | ICD-10-CM

## 2023-04-17 ENCOUNTER — APPOINTMENT (OUTPATIENT)
Age: 63
End: 2023-04-17

## 2023-04-17 ENCOUNTER — OUTPATIENT (OUTPATIENT)
Dept: OUTPATIENT SERVICES | Facility: HOSPITAL | Age: 63
LOS: 1 days | Discharge: HOME | End: 2023-04-17

## 2023-04-17 DIAGNOSIS — I25.810 ATHEROSCLEROSIS OF CORONARY ARTERY BYPASS GRAFT(S) WITHOUT ANGINA PECTORIS: ICD-10-CM

## 2023-04-17 DIAGNOSIS — Z98.890 OTHER SPECIFIED POSTPROCEDURAL STATES: Chronic | ICD-10-CM

## 2023-04-19 ENCOUNTER — APPOINTMENT (OUTPATIENT)
Age: 63
End: 2023-04-19

## 2023-04-19 ENCOUNTER — OUTPATIENT (OUTPATIENT)
Dept: OUTPATIENT SERVICES | Facility: HOSPITAL | Age: 63
LOS: 1 days | Discharge: HOME | End: 2023-04-19

## 2023-04-19 DIAGNOSIS — Z90.49 ACQUIRED ABSENCE OF OTHER SPECIFIED PARTS OF DIGESTIVE TRACT: Chronic | ICD-10-CM

## 2023-04-19 DIAGNOSIS — I25.810 ATHEROSCLEROSIS OF CORONARY ARTERY BYPASS GRAFT(S) WITHOUT ANGINA PECTORIS: ICD-10-CM

## 2023-04-19 DIAGNOSIS — Z98.890 OTHER SPECIFIED POSTPROCEDURAL STATES: Chronic | ICD-10-CM

## 2023-04-19 DIAGNOSIS — Z96.642 PRESENCE OF LEFT ARTIFICIAL HIP JOINT: Chronic | ICD-10-CM

## 2023-04-19 DIAGNOSIS — Z98.84 BARIATRIC SURGERY STATUS: Chronic | ICD-10-CM

## 2023-04-21 ENCOUNTER — APPOINTMENT (OUTPATIENT)
Age: 63
End: 2023-04-21

## 2023-04-21 ENCOUNTER — OUTPATIENT (OUTPATIENT)
Dept: OUTPATIENT SERVICES | Facility: HOSPITAL | Age: 63
LOS: 1 days | Discharge: HOME | End: 2023-04-21

## 2023-04-21 DIAGNOSIS — I25.810 ATHEROSCLEROSIS OF CORONARY ARTERY BYPASS GRAFT(S) WITHOUT ANGINA PECTORIS: ICD-10-CM

## 2023-04-21 DIAGNOSIS — Z98.890 OTHER SPECIFIED POSTPROCEDURAL STATES: Chronic | ICD-10-CM

## 2023-04-21 DIAGNOSIS — Z96.642 PRESENCE OF LEFT ARTIFICIAL HIP JOINT: Chronic | ICD-10-CM

## 2023-04-21 DIAGNOSIS — Z90.49 ACQUIRED ABSENCE OF OTHER SPECIFIED PARTS OF DIGESTIVE TRACT: Chronic | ICD-10-CM

## 2023-04-21 DIAGNOSIS — Z98.84 BARIATRIC SURGERY STATUS: Chronic | ICD-10-CM

## 2023-04-24 ENCOUNTER — APPOINTMENT (OUTPATIENT)
Age: 63
End: 2023-04-24

## 2023-04-24 ENCOUNTER — OUTPATIENT (OUTPATIENT)
Dept: OUTPATIENT SERVICES | Facility: HOSPITAL | Age: 63
LOS: 1 days | Discharge: HOME | End: 2023-04-24

## 2023-04-24 DIAGNOSIS — Z96.642 PRESENCE OF LEFT ARTIFICIAL HIP JOINT: Chronic | ICD-10-CM

## 2023-04-24 DIAGNOSIS — Z90.49 ACQUIRED ABSENCE OF OTHER SPECIFIED PARTS OF DIGESTIVE TRACT: Chronic | ICD-10-CM

## 2023-04-24 DIAGNOSIS — I25.810 ATHEROSCLEROSIS OF CORONARY ARTERY BYPASS GRAFT(S) WITHOUT ANGINA PECTORIS: ICD-10-CM

## 2023-04-24 DIAGNOSIS — Z98.84 BARIATRIC SURGERY STATUS: Chronic | ICD-10-CM

## 2023-04-26 ENCOUNTER — OUTPATIENT (OUTPATIENT)
Dept: OUTPATIENT SERVICES | Facility: HOSPITAL | Age: 63
LOS: 1 days | Discharge: HOME | End: 2023-04-26

## 2023-04-26 ENCOUNTER — APPOINTMENT (OUTPATIENT)
Age: 63
End: 2023-04-26

## 2023-04-26 DIAGNOSIS — Z98.890 OTHER SPECIFIED POSTPROCEDURAL STATES: Chronic | ICD-10-CM

## 2023-04-26 DIAGNOSIS — I25.810 ATHEROSCLEROSIS OF CORONARY ARTERY BYPASS GRAFT(S) WITHOUT ANGINA PECTORIS: ICD-10-CM

## 2023-04-26 DIAGNOSIS — Z98.84 BARIATRIC SURGERY STATUS: Chronic | ICD-10-CM

## 2023-04-26 DIAGNOSIS — Z96.642 PRESENCE OF LEFT ARTIFICIAL HIP JOINT: Chronic | ICD-10-CM

## 2023-04-26 DIAGNOSIS — Z90.49 ACQUIRED ABSENCE OF OTHER SPECIFIED PARTS OF DIGESTIVE TRACT: Chronic | ICD-10-CM

## 2023-04-28 ENCOUNTER — OUTPATIENT (OUTPATIENT)
Dept: OUTPATIENT SERVICES | Facility: HOSPITAL | Age: 63
LOS: 1 days | Discharge: HOME | End: 2023-04-28

## 2023-04-28 ENCOUNTER — APPOINTMENT (OUTPATIENT)
Age: 63
End: 2023-04-28

## 2023-04-28 DIAGNOSIS — Z96.642 PRESENCE OF LEFT ARTIFICIAL HIP JOINT: Chronic | ICD-10-CM

## 2023-04-28 DIAGNOSIS — Z98.84 BARIATRIC SURGERY STATUS: Chronic | ICD-10-CM

## 2023-04-28 DIAGNOSIS — I25.810 ATHEROSCLEROSIS OF CORONARY ARTERY BYPASS GRAFT(S) WITHOUT ANGINA PECTORIS: ICD-10-CM

## 2023-05-01 ENCOUNTER — APPOINTMENT (OUTPATIENT)
Age: 63
End: 2023-05-01

## 2023-05-01 ENCOUNTER — OUTPATIENT (OUTPATIENT)
Dept: OUTPATIENT SERVICES | Facility: HOSPITAL | Age: 63
LOS: 1 days | Discharge: HOME | End: 2023-05-01
Payer: COMMERCIAL

## 2023-05-01 DIAGNOSIS — I25.810 ATHEROSCLEROSIS OF CORONARY ARTERY BYPASS GRAFT(S) WITHOUT ANGINA PECTORIS: ICD-10-CM

## 2023-05-01 DIAGNOSIS — Z98.890 OTHER SPECIFIED POSTPROCEDURAL STATES: Chronic | ICD-10-CM

## 2023-05-01 DIAGNOSIS — Z96.642 PRESENCE OF LEFT ARTIFICIAL HIP JOINT: Chronic | ICD-10-CM

## 2023-05-01 DIAGNOSIS — Z98.84 BARIATRIC SURGERY STATUS: Chronic | ICD-10-CM

## 2023-05-01 DIAGNOSIS — Z90.49 ACQUIRED ABSENCE OF OTHER SPECIFIED PARTS OF DIGESTIVE TRACT: Chronic | ICD-10-CM

## 2023-05-01 PROCEDURE — 93798 PHYS/QHP OP CAR RHAB W/ECG: CPT

## 2023-05-03 ENCOUNTER — OUTPATIENT (OUTPATIENT)
Dept: OUTPATIENT SERVICES | Facility: HOSPITAL | Age: 63
LOS: 1 days | Discharge: HOME | End: 2023-05-03

## 2023-05-03 ENCOUNTER — APPOINTMENT (OUTPATIENT)
Age: 63
End: 2023-05-03

## 2023-05-03 DIAGNOSIS — Z98.890 OTHER SPECIFIED POSTPROCEDURAL STATES: Chronic | ICD-10-CM

## 2023-05-03 DIAGNOSIS — I25.810 ATHEROSCLEROSIS OF CORONARY ARTERY BYPASS GRAFT(S) WITHOUT ANGINA PECTORIS: ICD-10-CM

## 2023-05-03 DIAGNOSIS — Z96.642 PRESENCE OF LEFT ARTIFICIAL HIP JOINT: Chronic | ICD-10-CM

## 2023-05-03 DIAGNOSIS — Z98.84 BARIATRIC SURGERY STATUS: Chronic | ICD-10-CM

## 2023-05-05 ENCOUNTER — OUTPATIENT (OUTPATIENT)
Dept: OUTPATIENT SERVICES | Facility: HOSPITAL | Age: 63
LOS: 1 days | Discharge: HOME | End: 2023-05-05

## 2023-05-05 ENCOUNTER — APPOINTMENT (OUTPATIENT)
Age: 63
End: 2023-05-05

## 2023-05-05 DIAGNOSIS — Z98.84 BARIATRIC SURGERY STATUS: Chronic | ICD-10-CM

## 2023-05-05 DIAGNOSIS — I25.810 ATHEROSCLEROSIS OF CORONARY ARTERY BYPASS GRAFT(S) WITHOUT ANGINA PECTORIS: ICD-10-CM

## 2023-05-05 DIAGNOSIS — Z90.49 ACQUIRED ABSENCE OF OTHER SPECIFIED PARTS OF DIGESTIVE TRACT: Chronic | ICD-10-CM

## 2023-05-08 ENCOUNTER — APPOINTMENT (OUTPATIENT)
Age: 63
End: 2023-05-08

## 2023-05-08 ENCOUNTER — OUTPATIENT (OUTPATIENT)
Dept: OUTPATIENT SERVICES | Facility: HOSPITAL | Age: 63
LOS: 1 days | Discharge: HOME | End: 2023-05-08

## 2023-05-08 DIAGNOSIS — Z96.642 PRESENCE OF LEFT ARTIFICIAL HIP JOINT: Chronic | ICD-10-CM

## 2023-05-08 DIAGNOSIS — I25.810 ATHEROSCLEROSIS OF CORONARY ARTERY BYPASS GRAFT(S) WITHOUT ANGINA PECTORIS: ICD-10-CM

## 2023-05-08 DIAGNOSIS — Z98.84 BARIATRIC SURGERY STATUS: Chronic | ICD-10-CM

## 2023-05-08 DIAGNOSIS — Z98.890 OTHER SPECIFIED POSTPROCEDURAL STATES: Chronic | ICD-10-CM

## 2023-05-08 DIAGNOSIS — Z90.49 ACQUIRED ABSENCE OF OTHER SPECIFIED PARTS OF DIGESTIVE TRACT: Chronic | ICD-10-CM

## 2023-05-10 ENCOUNTER — OUTPATIENT (OUTPATIENT)
Dept: OUTPATIENT SERVICES | Facility: HOSPITAL | Age: 63
LOS: 1 days | Discharge: HOME | End: 2023-05-10

## 2023-05-10 ENCOUNTER — APPOINTMENT (OUTPATIENT)
Age: 63
End: 2023-05-10

## 2023-05-10 DIAGNOSIS — I25.810 ATHEROSCLEROSIS OF CORONARY ARTERY BYPASS GRAFT(S) WITHOUT ANGINA PECTORIS: ICD-10-CM

## 2023-05-10 DIAGNOSIS — Z90.49 ACQUIRED ABSENCE OF OTHER SPECIFIED PARTS OF DIGESTIVE TRACT: Chronic | ICD-10-CM

## 2023-05-12 ENCOUNTER — APPOINTMENT (OUTPATIENT)
Age: 63
End: 2023-05-12

## 2023-05-12 ENCOUNTER — OUTPATIENT (OUTPATIENT)
Dept: OUTPATIENT SERVICES | Facility: HOSPITAL | Age: 63
LOS: 1 days | Discharge: HOME | End: 2023-05-12

## 2023-05-12 DIAGNOSIS — I25.810 ATHEROSCLEROSIS OF CORONARY ARTERY BYPASS GRAFT(S) WITHOUT ANGINA PECTORIS: ICD-10-CM

## 2023-05-12 DIAGNOSIS — Z98.890 OTHER SPECIFIED POSTPROCEDURAL STATES: Chronic | ICD-10-CM

## 2023-05-12 DIAGNOSIS — Z98.84 BARIATRIC SURGERY STATUS: Chronic | ICD-10-CM

## 2023-05-12 DIAGNOSIS — Z90.49 ACQUIRED ABSENCE OF OTHER SPECIFIED PARTS OF DIGESTIVE TRACT: Chronic | ICD-10-CM

## 2023-05-12 DIAGNOSIS — Z96.642 PRESENCE OF LEFT ARTIFICIAL HIP JOINT: Chronic | ICD-10-CM

## 2023-05-15 ENCOUNTER — APPOINTMENT (OUTPATIENT)
Age: 63
End: 2023-05-15

## 2023-05-15 ENCOUNTER — OUTPATIENT (OUTPATIENT)
Dept: OUTPATIENT SERVICES | Facility: HOSPITAL | Age: 63
LOS: 1 days | Discharge: HOME | End: 2023-05-15

## 2023-05-15 DIAGNOSIS — Z98.890 OTHER SPECIFIED POSTPROCEDURAL STATES: Chronic | ICD-10-CM

## 2023-05-15 DIAGNOSIS — Z90.49 ACQUIRED ABSENCE OF OTHER SPECIFIED PARTS OF DIGESTIVE TRACT: Chronic | ICD-10-CM

## 2023-05-15 DIAGNOSIS — Z96.642 PRESENCE OF LEFT ARTIFICIAL HIP JOINT: Chronic | ICD-10-CM

## 2023-05-15 DIAGNOSIS — I25.810 ATHEROSCLEROSIS OF CORONARY ARTERY BYPASS GRAFT(S) WITHOUT ANGINA PECTORIS: ICD-10-CM

## 2023-05-15 DIAGNOSIS — Z98.84 BARIATRIC SURGERY STATUS: Chronic | ICD-10-CM

## 2023-05-17 ENCOUNTER — OUTPATIENT (OUTPATIENT)
Dept: OUTPATIENT SERVICES | Facility: HOSPITAL | Age: 63
LOS: 1 days | Discharge: HOME | End: 2023-05-17

## 2023-05-17 ENCOUNTER — APPOINTMENT (OUTPATIENT)
Age: 63
End: 2023-05-17

## 2023-05-17 DIAGNOSIS — Z98.84 BARIATRIC SURGERY STATUS: Chronic | ICD-10-CM

## 2023-05-17 DIAGNOSIS — Z96.642 PRESENCE OF LEFT ARTIFICIAL HIP JOINT: Chronic | ICD-10-CM

## 2023-05-17 DIAGNOSIS — I25.810 ATHEROSCLEROSIS OF CORONARY ARTERY BYPASS GRAFT(S) WITHOUT ANGINA PECTORIS: ICD-10-CM

## 2023-05-17 DIAGNOSIS — Z90.49 ACQUIRED ABSENCE OF OTHER SPECIFIED PARTS OF DIGESTIVE TRACT: Chronic | ICD-10-CM

## 2023-05-17 DIAGNOSIS — Z98.890 OTHER SPECIFIED POSTPROCEDURAL STATES: Chronic | ICD-10-CM

## 2023-05-19 ENCOUNTER — APPOINTMENT (OUTPATIENT)
Age: 63
End: 2023-05-19

## 2023-07-13 ENCOUNTER — APPOINTMENT (OUTPATIENT)
Dept: NEUROLOGY | Facility: CLINIC | Age: 63
End: 2023-07-13
Payer: COMMERCIAL

## 2023-07-13 VITALS — DIASTOLIC BLOOD PRESSURE: 75 MMHG | HEART RATE: 62 BPM | SYSTOLIC BLOOD PRESSURE: 131 MMHG

## 2023-07-13 VITALS — WEIGHT: 220 LBS | HEIGHT: 72 IN | BODY MASS INDEX: 29.8 KG/M2

## 2023-07-13 PROCEDURE — 99204 OFFICE O/P NEW MOD 45 MIN: CPT

## 2023-07-13 NOTE — ASSESSMENT
[FreeTextEntry1] : Impression is that patient has symptoms of polyneuropathy. 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 We are going to do EMGs of the upper and lower extremities to compare with those done in 2016 and 2019. Patient will follow up with us when testing is complete. \par \par Total clinician time spent today on the patient is 30 minutes including preparing to see the patient, obtaining and/or reviewing and confirming history, performing medically necessary and appropriate examination, counseling and educating the patient and/or family, documenting clinical information in the EHR and communicating and/or referring to other healthcare professionals.\par \par Entered by Kimberly Joshi acting as scribe for Dr. Mac.\par \par \par The documentation recorded by the scribe, in my presence, accurately reflects the service I personally performed, and the decisions made by me with my edits as appropriate. \par Speedy Mac MD, FAAN, FACP\par Diplomate [employer] Board of Psychiatry & Neurology\par \par

## 2023-07-13 NOTE — HISTORY OF PRESENT ILLNESS
[FreeTextEntry1] : Mr. Francis is a 62-year-old male who presents today in neurologic consultation for symptoms of progressive numbness in the fingers of both hands and the feet bilaterally. He states that he cannot feel the peddles of the car with his feet any longer. He does have high blood pressure, heart disease, and diabetes at this time. \par \par Of note is the fact that patient saw me in 2016 for symptoms of right foot drop. He did have gastric bypass surgery in 2013 and lost about 100 lbs. His sugars were normal and at that time he stated he was not a diabetic. His examination in 2016 revealed a partial right foot drop and absent ankle jerks. He had EMGs in 2019 which revealed bilateral carpal tunnel syndrome and sensory neuropathy in the upper extremities. EMGs in 2016 revealed a right peroneal neuropathy.

## 2023-07-13 NOTE — PHYSICAL EXAM
[FreeTextEntry1] : PHYSICAL EXAMINATION:\par Head: Normocephalic, atraumatic. Negative TA tenderness/prominence. \par \par Neck: Supple with full range of motion; nontender with negative bilateral Spurling's signs. \par \par Spine: Full range of motion; nontender. Negative straight leg raise maneuvers. \par \par Extremities: Non-tender. Atraumatic. Negative Tinel's signs. \par \par NEUROLOGICAL EXAMINATION: \par \par Mental Status: Patient is a good informant with intact orientation, attention, concentration, recent and remote memory. Language evaluation reveals no evidence of aphasia. Fund of knowledge is normal. \par \par Cranial Nerves Cranial Nerves: \par \par II, III, IV, VI: Pupils are equal, round, and reactive to light and accommodation. No evidence of afferent pupillary defect. Visual fields are full to confrontation. Eye movements are full without evidence of nystagmus or internuclear ophthalmoplegia. Funduscopic examination reveals sharp disc margins. \par \par V: Normal jaw movements. Normal facial sensation. \par \par VII: Normal facial motor testing. \par \par VIII: Grossly normal hearing bilaterally. \par \par IX, X: Palate moves symmetrically. No dysarthria. \par \par XI: Normal shoulder shrug and sternocleidomastoid power. \par \par XII: Tongue appears normal and protrudes in the midline. \par \par Motor: Normal bulk, tone, and power throughout. Right foot drop is not present. \par \par Muscle Stretch Reflexes (right/left): 2+ symmetrical except for absent ankle jerks.\par \par Plantar Responses: Flexor bilaterally. \par \par Coordination: Normal finger to nose and heel to shin testing, no truncal ataxia and no tremor. \par \par Sensation: Normal primary sensation. Normal double simultaneous stimulation. \par \par Gait and Station: Normal base, stride, and turning. Normal toe and heel walking. Slightly unsteady tandem. Negative Romberg.\par

## 2023-08-11 ENCOUNTER — APPOINTMENT (OUTPATIENT)
Dept: NEUROLOGY | Facility: CLINIC | Age: 63
End: 2023-08-11
Payer: MEDICARE

## 2023-08-11 PROCEDURE — 95886 MUSC TEST DONE W/N TEST COMP: CPT

## 2023-08-11 PROCEDURE — 95912 NRV CNDJ TEST 11-12 STUDIES: CPT

## 2023-08-18 ENCOUNTER — APPOINTMENT (OUTPATIENT)
Dept: NEUROLOGY | Facility: CLINIC | Age: 63
End: 2023-08-18
Payer: MEDICARE

## 2023-08-18 PROCEDURE — 95886 MUSC TEST DONE W/N TEST COMP: CPT

## 2023-08-18 PROCEDURE — 95912 NRV CNDJ TEST 11-12 STUDIES: CPT

## 2023-08-30 ENCOUNTER — APPOINTMENT (OUTPATIENT)
Dept: NEUROLOGY | Facility: CLINIC | Age: 63
End: 2023-08-30
Payer: MEDICARE

## 2023-08-30 DIAGNOSIS — G62.9 POLYNEUROPATHY, UNSPECIFIED: ICD-10-CM

## 2023-08-30 PROCEDURE — 99214 OFFICE O/P EST MOD 30 MIN: CPT

## 2023-08-30 RX ORDER — GABAPENTIN 300 MG/1
300 CAPSULE ORAL
Qty: 30 | Refills: 3 | Status: ACTIVE | COMMUNITY
Start: 2023-08-30 | End: 1900-01-01

## 2023-08-30 RX ORDER — CLOPIDOGREL BISULFATE 75 MG/1
75 TABLET, FILM COATED ORAL DAILY
Qty: 30 | Refills: 0 | Status: DISCONTINUED | COMMUNITY
Start: 2021-03-29 | End: 2023-08-30

## 2023-08-30 NOTE — REASON FOR VISIT
[Follow-Up: _____] : a [unfilled] follow-up visit [Initial Evaluation] : an initial evaluation [FreeTextEntry1] : neuropathy

## 2023-08-30 NOTE — ASSESSMENT
[FreeTextEntry1] : 63 year old male with chronic polyneuropathy affecting the upper and lower extremities. Today we reviewed his EMG testing and discussed medication  that can help his symptoms. He will begin Gabapentin 300mg at night and we reviewed all potential risks/  benefits, potential side effects and interactions during todays visit. He will return to the office in 1 month to discuss his progress.   I have personally reviewed with the PA, this patients history and physical exam findings, as documented above. I have discussed the relevant areas of concern, having direct implications to the presenting problems and illnesses, and have personally examined all pertinent findings which impact on the prior neurological treatment.   nAa Thao MS, PA-MARCUS Mac MD

## 2023-08-30 NOTE — PHYSICAL EXAM
[General Appearance - Alert] : alert [General Appearance - In No Acute Distress] : in no acute distress [Oriented To Time, Place, And Person] : oriented to person, place, and time [Impaired Insight] : insight and judgment were intact [Affect] : the affect was normal [Person] : oriented to person [Place] : oriented to place [Time] : oriented to time [Concentration Intact] : normal concentrating ability [Visual Intact] : visual attention was ~T not ~L decreased [Naming Objects] : no difficulty naming common objects [Repeating Phrases] : no difficulty repeating a phrase [Writing A Sentence] : no difficulty writing a sentence [Fluency] : fluency intact [Comprehension] : comprehension intact [Reading] : reading intact [Past History] : adequate knowledge of personal past history [Cranial Nerves Optic (II)] : visual acuity intact bilaterally,  visual fields full to confrontation, pupils equal round and reactive to light [Cranial Nerves Oculomotor (III)] : extraocular motion intact [Cranial Nerves Trigeminal (V)] : facial sensation intact symmetrically [Cranial Nerves Facial (VII)] : face symmetrical [Cranial Nerves Vestibulocochlear (VIII)] : hearing was intact bilaterally [Cranial Nerves Glossopharyngeal (IX)] : tongue and palate midline [Cranial Nerves Accessory (XI - Cranial And Spinal)] : head turning and shoulder shrug symmetric [Cranial Nerves Hypoglossal (XII)] : there was no tongue deviation with protrusion [Motor Tone] : muscle tone was normal in all four extremities [Motor Strength] : muscle strength was normal in all four extremities [No Muscle Atrophy] : normal bulk in all four extremities [Motor Strength Upper Extremities Bilaterally] : strength was normal in both upper extremities [Motor Strength Lower Extremities Bilaterally] : there was weakness in both lower extremities [Sensation Tactile Decrease] : light touch was intact [Balance] : balance was intact [Past-pointing] : there was no past-pointing [Tremor] : no tremor present [2+] : Ankle jerk left 2+ [Plantar Reflex Right Only] : normal on the right [Plantar Reflex Left Only] : normal on the left [FreeTextEntry6] : RLE decreased dorsiflexion/ plantarflexion chronic [PERRL With Normal Accommodation] : pupils were equal in size, round, reactive to light, with normal accommodation [Extraocular Movements] : extraocular movements were intact [FreeTextEntry1] : Hard of hearing  [Neck Appearance] : the appearance of the neck was normal [No Spinal Tenderness] : no spinal tenderness [Abnormal Walk] : normal gait [Involuntary Movements] : no involuntary movements were seen

## 2023-08-30 NOTE — HISTORY OF PRESENT ILLNESS
[FreeTextEntry1] : Original Presentation : Mr. Francis is a 63-year-old male who presents today in neurologic consultation for symptoms of progressive numbness in the fingers of both hands and the feet bilaterally. He states that he cannot feel the peddles of the car with his feet any longer. He does have high blood pressure, heart disease, and diabetes at this time.   Of note is the fact that patient saw me in 2016 for symptoms of right foot drop. He did have gastric bypass surgery in 2013 and lost about 100 lbs. His sugars were normal and at that time he stated he was not a diabetic. His examination in 2016 revealed a partial right foot drop and absent ankle jerks. He had EMGs in 2019 which revealed bilateral carpal tunnel syndrome and sensory neuropathy in the upper extremities. EMGs in 2016 revealed a right peroneal neuropathy.   EMG lowers 8.18.23 : diffuse sensori-motor neuropathy  EMG uppers 8.18.23 : diffuse sensori-motor polyneuropathy improved since 10/22/2019 study   Today : Today I had the pleasure of seeing Mr. Francis  in our office for follow up.  The patients previous history and physical findings have been reviewed.    Mr. Francis remains under our care for polyneuropathy to the upper and lower extremities, a chronic condition for which he is receiving active treatment for. Today we reviewed his EMG testing of both the upper and lower extremities noted above. He reports persistent numbness to his legs primarily at night that feels like "tiny pin pricks". He does not currently take any medication for this condition and denies any history of diabetes or vitamin / nutritional deficiencies. On physical exam he maintains good strength in his upper and lower extremities with chronic RLE foot drop noted since 2016 improved during todays examination. He denies any back pain, saddle anesthesia or urinary / bowel incontinence or retention.

## 2023-10-02 ENCOUNTER — APPOINTMENT (OUTPATIENT)
Dept: NEUROLOGY | Facility: CLINIC | Age: 63
End: 2023-10-02

## 2024-02-29 NOTE — ASU PATIENT PROFILE, ADULT - ATTEMPT TO OOB
CT 2/7 shows small consolidation in the upper lobes and left lower lobe with enhancing lesion in the peripheral of right lower lobe, may be embolic versus hematogenous seeding from high-grade bacteremia.  there is no evident of pulmonary embolism to suggest that this is an infarct.   Repeat CT 2/15 revealed possible septic emboli, gas and fluid subcutaneous collection in the pacemaker bed likely postsurgical, reactive mild mediastinal lymphadenopathy.  CT chest without contrast  2/21: Peripheral left lower lobe opacity with reverse halo sign appearance. This can be seen in the setting of pulmonary infarct   Doppler US is negative for DVT  VQ Scan is intermediate for PE  TTE 2/21 with normal right ventricle function  Unable to obtain PE study to confirm/rule out given LINDEN. Suspicion remains higher for septic embolism. Given hemodynamic stability with only 1L NC oxygen requirement due to volume overload, would hold off on AC for now.   Appreciate pulmonology input   no

## 2024-04-10 NOTE — ED PROVIDER NOTE - PHYSICAL EXAMINATION
CONSTITUTIONAL: Well-appearing; well-nourished; in no apparent distress.   EYES: PERRL; EOM intact.   ENT: normal nose; no rhinorrhea; normal pharynx with no tonsillar hypertrophy.   NECK: Supple; non-tender; no cervical lymphadenopathy.    CARDIOVASCULAR: Normal S1, S2; no murmurs, rubs, or gallops. Equal radial pulses  RESPIRATORY: Normal chest excursion with respiration; breath sounds clear and equal bilaterally; no wheezes, rhonchi, or rales.  GI/: Normal bowel sounds; non-distended; non-tender; no palpable organomegaly.   MS: No evidence of trauma or deformity. Normal ROM in all four extremities; non-tender to palpation; distal pulses are normal.   SKIN: Normal for age and race; warm; dry; good turgor; no apparent lesions or exudate.   Extrem: no peripheral edema. No calf ttp  NEURO/PSYCH: A & O x 4; grossly unremarkable. mood and manner are appropriate. Grooming and personal hygiene are appropriate. Allergy;

## 2024-06-03 NOTE — PROVIDER CONTACT NOTE (OTHER) - REASON
failed trial of void Show Applicator Variable?: Yes Duration Of Freeze Thaw-Cycle (Seconds): 3 Number Of Freeze-Thaw Cycles: 3 freeze-thaw cycles Consent: The patient's consent was obtained including but not limited to risks of crusting, scabbing, blistering, scarring, darker or lighter pigmentary change, recurrence, incomplete removal and infection. Render Post-Care Instructions In Note?: no Detail Level: Zone Post-Care Instructions: I reviewed with the patient in detail post-care instructions. Patient is to wear sunprotection, and avoid picking at any of the treated lesions. Pt may apply Vaseline to crusted or scabbing areas.

## 2025-06-12 NOTE — PATIENT PROFILE ADULT - HOME ACCESSIBILITY CONCERNS
RP collection, sigmoid mass fungating infiltrative polypoid and ulcerated partially obstructing large mass was near to sigmoid colon none

## 2025-09-03 ENCOUNTER — APPOINTMENT (OUTPATIENT)
Facility: CLINIC | Age: 65
End: 2025-09-03
Payer: MEDICARE

## 2025-09-03 VITALS
RESPIRATION RATE: 18 BRPM | OXYGEN SATURATION: 97 % | SYSTOLIC BLOOD PRESSURE: 110 MMHG | WEIGHT: 245 LBS | HEIGHT: 72 IN | HEART RATE: 72 BPM | DIASTOLIC BLOOD PRESSURE: 70 MMHG | BODY MASS INDEX: 33.18 KG/M2

## 2025-09-03 DIAGNOSIS — R91.8 OTHER NONSPECIFIC ABNORMAL FINDING OF LUNG FIELD: ICD-10-CM

## 2025-09-03 DIAGNOSIS — F17.210 NICOTINE DEPENDENCE, CIGARETTES, UNCOMPLICATED: ICD-10-CM

## 2025-09-03 DIAGNOSIS — R06.02 SHORTNESS OF BREATH: ICD-10-CM

## 2025-09-03 PROCEDURE — 99204 OFFICE O/P NEW MOD 45 MIN: CPT

## 2025-09-03 RX ORDER — ATORVASTATIN CALCIUM 40 MG/1
40 TABLET, FILM COATED ORAL
Refills: 0 | Status: ACTIVE | COMMUNITY

## 2025-09-03 RX ORDER — KRILL/OM-3/DHA/EPA/PHOSPHO/AST 1000-230MG
81 CAPSULE ORAL
Refills: 0 | Status: ACTIVE | COMMUNITY

## 2025-09-03 RX ORDER — ALBUTEROL SULFATE 90 UG/1
108 (90 BASE) INHALANT RESPIRATORY (INHALATION)
Qty: 1 | Refills: 5 | Status: ACTIVE | COMMUNITY
Start: 2025-09-03 | End: 1900-01-01

## 2025-09-03 RX ORDER — EZETIMIBE 10 MG/1
10 TABLET ORAL
Refills: 0 | Status: ACTIVE | COMMUNITY

## 2025-09-03 RX ORDER — LOSARTAN POTASSIUM 25 MG/1
25 TABLET, FILM COATED ORAL
Refills: 0 | Status: ACTIVE | COMMUNITY

## 2025-09-03 RX ORDER — METFORMIN HYDROCHLORIDE 500 MG/1
500 TABLET, COATED ORAL
Refills: 0 | Status: ACTIVE | COMMUNITY